# Patient Record
Sex: MALE | Race: WHITE | NOT HISPANIC OR LATINO | Employment: OTHER | URBAN - METROPOLITAN AREA
[De-identification: names, ages, dates, MRNs, and addresses within clinical notes are randomized per-mention and may not be internally consistent; named-entity substitution may affect disease eponyms.]

---

## 2017-01-09 ENCOUNTER — RADIANT APPOINTMENT (OUTPATIENT)
Dept: GENERAL RADIOLOGY | Facility: OTHER | Age: 32
End: 2017-01-09
Attending: NURSE PRACTITIONER
Payer: COMMERCIAL

## 2017-01-09 ENCOUNTER — OFFICE VISIT (OUTPATIENT)
Dept: FAMILY MEDICINE | Facility: OTHER | Age: 32
End: 2017-01-09
Payer: COMMERCIAL

## 2017-01-09 VITALS
HEIGHT: 73 IN | TEMPERATURE: 98.9 F | WEIGHT: 217.4 LBS | HEART RATE: 71 BPM | OXYGEN SATURATION: 98 % | SYSTOLIC BLOOD PRESSURE: 144 MMHG | BODY MASS INDEX: 28.81 KG/M2 | DIASTOLIC BLOOD PRESSURE: 70 MMHG | RESPIRATION RATE: 16 BRPM

## 2017-01-09 DIAGNOSIS — Z23 NEED FOR PROPHYLACTIC VACCINATION AND INOCULATION AGAINST INFLUENZA: ICD-10-CM

## 2017-01-09 DIAGNOSIS — Z23 NEED FOR DIPHTHERIA-TETANUS-PERTUSSIS (TDAP) VACCINE, ADULT/ADOLESCENT: ICD-10-CM

## 2017-01-09 DIAGNOSIS — S69.91XA INJURY OF RIGHT HAND, INITIAL ENCOUNTER: Primary | ICD-10-CM

## 2017-01-09 DIAGNOSIS — M79.641 PAIN OF RIGHT HAND: ICD-10-CM

## 2017-01-09 PROCEDURE — 73130 X-RAY EXAM OF HAND: CPT | Mod: RT

## 2017-01-09 PROCEDURE — 99213 OFFICE O/P EST LOW 20 MIN: CPT | Performed by: NURSE PRACTITIONER

## 2017-01-09 ASSESSMENT — PAIN SCALES - GENERAL: PAINLEVEL: SEVERE PAIN (7)

## 2017-01-09 NOTE — PROGRESS NOTES
"  SUBJECTIVE:                                                    Crescencio Moncada is a 31 year old male who presents to clinic today for the following health issues:      HPI    Joint Pain     Onset: last night punched a wooden wall     Description:   Location: right hand  Character: Sharp and now constant with numbness    Intensity: 7/10    Progression of Symptoms: same    Accompanying Signs & Symptoms:  Other symptoms: numbness, swelling and not being able to move   History:   Previous similar pain: no       Precipitating factors:   Trauma or overuse: YES    Alleviating factors:  Improved by: ice and NSAID - advil       Therapies Tried and outcome: ice and advil      Problem list and histories reviewed & adjusted, as indicated.  Additional history: as documented    Problem list, Medication list, Allergies, and Medical/Social/Surgical histories reviewed in EPIC and updated as appropriate.    ROS:  Constitutional, HEENT, cardiovascular, pulmonary, GI, , musculoskeletal, neuro, skin, endocrine and psych systems are negative, except as otherwise noted.    OBJECTIVE:                                                    /70 mmHg  Pulse 71  Temp(Src) 98.9  F (37.2  C) (Temporal)  Resp 16  Ht 6' 0.76\" (1.848 m)  Wt 217 lb 6.4 oz (98.612 kg)  BMI 28.88 kg/m2  SpO2 98%  Body mass index is 28.88 kg/(m^2).  GENERAL: healthy, alert and no distress  NECK: no adenopathy, no asymmetry, masses, or scars and thyroid normal to palpation  RESP: lungs clear to auscultation - no rales, rhonchi or wheezes  CV: regular rate and rhythm, normal S1 S2, no S3 or S4, no murmur, click or rub, no peripheral edema and peripheral pulses strong  ABDOMEN: soft, nontender, no hepatosplenomegaly, no masses and bowel sounds normal  MS: Hand/Finger Exam: Inspection:Carpals: moderate swelling, Metacarpals:  moderate swelling, Long Finger:  moderate swelling, RIng Finger:  moderate swelling, Small Finger:  moderate swelling  Tender: Carpals, " Metacarpals, Long Finger  RIng Finger, Small Finger  Non-tender thumb and pointer  Range of Motion decreased due to pain  Strength: weak grasp, unable to fully extend  SKIN: lesion between pointer and middle finger dry scabbed over.    Diagnostic Test Results:   Xray - Reviewed possible fracture metacarpal fifth finger right hand. Radiology read pending     ASSESSMENT/PLAN:                                                      1. Injury of right hand, initial encounter  Will splint, elevate and ice. Follow up with orthopedic specialty in 7-10 days  - ORTHO  REFERRAL    2. Pain of right hand  Ibuprofen and tylenol of rdiscomfort  - TDAP (ADACEL AGES 11-64)  - XR Hand Right G/E 3 Views; Future  - XR Finger Right G/E 2 Views; Future    3. Need for diphtheria-tetanus-pertussis (Tdap) vaccine, adult/adolescent  declined  - TDAP (ADACEL AGES 11-64)    4. Need for prophylactic vaccination and inoculation against influenza  declinced  - TDAP (ADACEL AGES 11-64)    See Patient Instructions    MAGALIS Field [facility]

## 2017-01-09 NOTE — NURSING NOTE
"Chief Complaint   Patient presents with     Musculoskeletal Problem       Initial /70 mmHg  Pulse 71  Temp(Src) 98.9  F (37.2  C) (Temporal)  Resp 16  Ht 6' 0.76\" (1.848 m)  Wt 217 lb 6.4 oz (98.612 kg)  BMI 28.88 kg/m2  SpO2 98% Estimated body mass index is 28.88 kg/(m^2) as calculated from the following:    Height as of this encounter: 6' 0.76\" (1.848 m).    Weight as of this encounter: 217 lb 6.4 oz (98.612 kg).  BP completed using cuff size: hannah Clinton CMA (AAMA)    "

## 2017-01-09 NOTE — PATIENT INSTRUCTIONS
Please continue to wear splint as much as possible to immobilize fracture. Continue to ice and elevate, ibuprofen 800 mg every 8 hours with 1000 mg of tylenol (do not take over 4000 mg of tylenol in 24 hours.).    Follow up with Dr. Dunn in 7- 10 days.     Thank you,  Maria D Wahl CNP

## 2017-01-09 NOTE — MR AVS SNAPSHOT
After Visit Summary   1/9/2017    Crescencio Moncada    MRN: [id number]           Patient Information     Date Of Birth          1985        Visit Information        Provider Department      1/9/2017 1:10 PM Maria D Wahl APRN CNP [facility]        Today's Diagnoses     Injury of right hand, initial encounter    -  1     Pain of right hand         Need for diphtheria-tetanus-pertussis (Tdap) vaccine, adult/adolescent         Need for prophylactic vaccination and inoculation against influenza           Care Instructions    Please continue to wear splint as much as possible to immobilize fracture. Continue to ice and elevate, ibuprofen 800 mg every 8 hours with 1000 mg of tylenol (do not take over 4000 mg of tylenol in 24 hours.).    Follow up with Dr. Dunn in 7- 10 days.     Thank you,  Maria D Walh CNP          Follow-ups after your visit        Additional Services     ORTHO  REFERRAL       [facility] is referring you to the Orthopedic  Services at [facility].       The  Representative will assist you in the coordination of your Orthopedic and Musculoskeletal Care as prescribed by your physician.    The  Representative will call you within 1 business day to help schedule your appointment, or you may contact the  Representative at:    All areas ~ [phone]     Type of Referral : 7-10 days with Dr. Dunn in [place].      Timeframe requested: Within 2 weeks    Coverage of these services is subject to the terms and limitations of your health insurance plan.  Please call member services at your health plan with any benefit or coverage questions.      If X-rays, CT or MRI's have been performed, please contact the facility where they were done to arrange for , prior to your scheduled appointment.  Please bring this referral request to your appointment and present it to your  "specialist.                  Future tests that were ordered for you today     Open Future Orders        Priority Expected Expires Ordered    XR Finger Right G/E 2 Views Routine 2017            Who to contact     If you have questions or need follow up information about today's clinic visit or your schedule please contact [facility] directly at [phone].  Normal or non-critical lab and imaging results will be communicated to you by MyChart, letter or phone within 4 business days after the clinic has received the results. If you do not hear from us within 7 days, please contact the clinic through Swarm64hart or phone. If you have a critical or abnormal lab result, we will notify you by phone as soon as possible.  Submit refill requests through [employer] or call your pharmacy and they will forward the refill request to us. Please allow 3 business days for your refill to be completed.          Additional Information About Your Visit        Swarm64harInspherion Information     [employer] lets you send messages to your doctor, view your test results, renew your prescriptions, schedule appointments and more. To sign up, go to [email or web address] . Click on \"Log in\" on the left side of the screen, which will take you to the Welcome page. Then click on \"Sign up Now\" on the right side of the page.     You will be asked to enter the access code listed below, as well as some personal information. Please follow the directions to create your username and password.     Your access code is: [email or web address]  Expires: 2017  2:22 PM     Your access code will  in 90 days. If you need help or a new code, please call your [place] clinic or [phone].        Care EveryWhere ID     This is your Care EveryWhere ID. This could be used by other organizations to access your [place] medical records  [id number]        Your Vitals Were     Pulse Temperature Respirations Height BMI (Body Mass Index) Pulse " "Oximetry    71 98.9  F (37.2  C) (Temporal) 16 6' 0.76\" (1.848 m) 28.88 kg/m2 98%       Blood Pressure from Last 3 Encounters:   01/09/17 144/70   12/16/16 136/70    Weight from Last 3 Encounters:   01/09/17 217 lb 6.4 oz (98.612 kg)   12/16/16 216 lb 3.2 oz (98.068 kg)              We Performed the Following     ORTHO  REFERRAL     TDAP (ADACEL AGES 11-64)        Primary Care Provider Office Phone # Fax #    MAGALIS Cook [facility] [phone]       [facility] [address]  [facility] [address]        Thank you!     Thank you for choosing [facility]  for your care. Our goal is always to provide you with excellent care. Hearing back from our patients is one way we can continue to improve our services. Please take a few minutes to complete the written survey that you may receive in the mail after your visit with us. Thank you!             Your Updated Medication List - Protect others around you: Learn how to safely use, store and throw away your medicines at www.disposemymeds.org.          This list is accurate as of: 1/9/17  2:22 PM.  Always use your most recent med list.                   Brand Name Dispense Instructions for use    ketoconazole 2 % cream    NIZORAL    30 g    Apply topically 2 times daily       triamcinolone 0.1 % cream    KENALOG    30 g    Apply sparingly to affected area three times daily for 14 days.         "

## 2017-01-10 NOTE — PROGRESS NOTES
Quick Note:    Results discussed with patient in clinic. States understanding of these results.    Maria D Wahl CNP  ______

## 2017-01-17 ENCOUNTER — OFFICE VISIT (OUTPATIENT)
Dept: ORTHOPEDICS | Facility: CLINIC | Age: 32
End: 2017-01-17
Payer: COMMERCIAL

## 2017-01-17 ENCOUNTER — RADIANT APPOINTMENT (OUTPATIENT)
Dept: GENERAL RADIOLOGY | Facility: CLINIC | Age: 32
End: 2017-01-17
Attending: PHYSICAL MEDICINE & REHABILITATION
Payer: COMMERCIAL

## 2017-01-17 VITALS — WEIGHT: 217.4 LBS | BODY MASS INDEX: 28.81 KG/M2 | HEIGHT: 73 IN | HEART RATE: 60 BPM

## 2017-01-17 DIAGNOSIS — S62.306A CLOSED NONDISPLACED FRACTURE OF FIFTH METACARPAL BONE OF RIGHT HAND, UNSPECIFIED PORTION OF METACARPAL, INITIAL ENCOUNTER: Primary | ICD-10-CM

## 2017-01-17 DIAGNOSIS — S69.91XA: ICD-10-CM

## 2017-01-17 PROCEDURE — 26600 TREAT METACARPAL FRACTURE: CPT | Performed by: PHYSICAL MEDICINE & REHABILITATION

## 2017-01-17 PROCEDURE — 73130 X-RAY EXAM OF HAND: CPT | Mod: TC

## 2017-01-17 NOTE — PROGRESS NOTES
Sports Medicine Clinic Visit    PCP: Apryl Correia    CC: Patient presents with:  Fracture: right hand, little finger      HPI:  Crescencio Moncada is a 31 year old male who is seen in consultation at the request of Maria D Wahl NP.   He notes an injury on 1/8/2017 when he punched a wooden wall.  Pain is located on the right hand, little finger.  He rates the pain at a 8/10 at its worst and a 4/10 currently.  Symptoms are relieved with nothing and worsened by gripping. He endorses swelling, numbness and tingling through the 3rd-5th fingers, and weakness and denies popping, grinding, catching, locking, instability, pain in other joints and fever, chills.  Other treatment has included cold compresses, Tylenol, ibuprofen and bracing (pre fabricated wrist brace). He notes difficulty with use of the little finger. He has had increased swelling with the pre fabricated brace given to him by Maria D. He has not been back to work since the injury.       Review of Systems:  Musculoskeletal: as above  Remainder of review of systems is negative including constitutional, CV, pulmonary, GI, Skin and Neurologic except as noted in HPI or medical history.      Patient's current problem list, past medical and surgical history, and family history were reviewed.    No previous hand fracture.  No past surgical history on file.  Family History   Problem Relation Age of Onset     Myocardial Infarction Paternal Grandfather      Genetic Disorder Mother      spinal muscular atrophy gene     Genetic Disorder Sister 0     spinal muscular atrophy     Coronary Artery Disease Paternal Half-Brother      Social History     Social History     Marital Status:      Spouse Name: N/A     Number of Children: N/A     Years of Education: N/A     Occupational History     Not on file.     Social History Main Topics     Smoking status: Current Every Day Smoker     Types: Dip, chew, snus or snuff     Smokeless tobacco: Not on file     Alcohol  "Use: Yes      Comment: 4-5 beers/day, sometimes more on the weekend     Drug Use: No     Sexual Activity:     Partners: Female     Other Topics Concern     Not on file     Social History Narrative       He works doing ceramic tile installation  At baseline, he does not need assistance with ambulation      Current Outpatient Prescriptions   Medication     ketoconazole (NIZORAL) 2 % cream     triamcinolone (KENALOG) 0.1 % cream     No current facility-administered medications for this visit.     No Known Allergies      Objective:  Pulse 60  Ht 6' 0.76\" (1.848 m)  Wt 217 lb 6.4 oz (98.612 kg)  BMI 28.88 kg/m2    General: healthy, alert and in no distress    Head: Normocephalic, atraumatic  Eyes: no scleral icterus or conjunctival erythema   Oropharynx:  Mucous membranes moist  CV: regular rhythm by palpation, 2+ distal pulses, no pedal edema    Resp: normal respiratory effort without conversational dyspnea   Psych: normal mood and affect    Gait: Non-antalgic, appropriate coordination and balance   Neuro: normal light touch sensory exam of the extremities. Motor strength as noted below    Musculoskeletal:    Bilateral Wrist and Hand exam    Inspection:  Mild swelling of the right dorsal ulnar hand.  Abrasions on MCP joints of both hands.      Palpation:  Tender to palpation of the right 5th MCP joint.      ROM:  Wrist flexion/extension is full bilaterally.  He does have some pain with movement of the right 5th finger.      Strength:  5/5 bilateral shoulder abduction, elbow flexion/extension, forearm supination/pronation, wrist flexion/extension.  <3/5 right  with 5th finger.  4/5 right finger abduction.  5/5 left  strength and finger abduction.    Neurovascular:       2+ radial pulses bilaterally and normal sensation to light touch in the radial, median and ulnar nerve distributions.  Post cast application neurovascular check intact.      Radiology:  Independent visualization of images performed.  Repeat " x-rays today show continued distal 5th metaphyseal fracture.  No change in the mild angulation.    Recent Results (from the past 744 hour(s))   XR Hand Right G/E 3 Views    Narrative    RIGHT HAND THREE OR MORE VIEWS  1/9/2017 2:03 PM     HISTORY: Pain in right hand.    COMPARISON: None.        Impression    IMPRESSION:  There is a distal fifth metacarpal metaphyseal fracture  with mild volar and radial angulation of the distal fragment. No other  fractures. Joint spaces are well-maintained.    TERE MENDIETA MD       Assessment:  1. Injury of right hand including fingers    2. Fracture of fifth metacarpal bone of right hand        Plan:  Discussed the assessment with the patient. We discussed the following treatment options: symptom treatment, activity modification/rest, imaging and rehab. Following discussion, plan:  -Short arm ulnar gutter cast applied today.  Cast care outlined in patient instructions.    -He is cleared to return to work as a , but I told him that if there is something he realizes he cannot do when he returns to work that we would be happy to write him a note detailing restrictions.    -Follow up in 2 weeks for repeat x-rays and re-evaluation.    Jessie Dunn MD, CAQ  [facility]

## 2017-01-17 NOTE — PATIENT INSTRUCTIONS
Today's Plan of Care:  - Cast care as directed below     Follow Up:  2 weeks for repeat x-rays and reevaluation. Call if you have any questions or concerns.        Caring for Your Cast     A cast is used to protect an injured body part and allow it to heal by limiting the amount of motion occurring around the injury. Pain and swelling of the injured area is normal for 48 hours after your cast is put on. If you have swelling, wiggle your toes or fingers to ease it. Doing so encourages blood flow to your arm or leg.     It is important that you keep your cast dry, unless your doctor tells you differently. If the padding of the cast gets wet, your skin may be damaged and become infected. When showering or taking a bath, put the cast in a heavy plastic bag that can be held in place with a rubber band. If your cast gets wet and does not dry out in four to five hours, call your doctor s office.   To keep the cast clean, use wash clothes or baby wipes around it.   You may experience some itching inside the cast. This is normal. Avoid putting anything in the cast, even your finger, as you can injure your skin and cause infection. Try shaking some talcum powder or blowing cool air from a hair dryer into the cast to ease itching.   If these signs or symptoms develop, call your doctor immediately.       Pain gets worse     Swelling that cuts off blood flow that does not go away, even when you lift the body part above the level of your heart     Fever after itching. It may be related to an infection.     Fluid draining from your skin under the cast     Your cast may become loose as swelling goes down. If the cast feels too loose or if it is so loose you can take it off, call your doctor s office.     Your doctor or  will give you recommendations for activity based on your injury. Some sports allow casts if properly padded by a doctor or .     For complete healing, your cast should only be removed  at the direction of your doctor or clinic staff. A special saw ensures its safe removal and protects the skin and other tissue under the cast.

## 2017-01-17 NOTE — MR AVS SNAPSHOT
After Visit Summary   1/17/2017    Crescencio Moncada    MRN: [id number]           Patient Information     Date Of Birth          1985        Visit Information        Provider Department      1/17/2017 11:00 AM Apryl Dunn MD [facility]        Today's Diagnoses     Injury of right hand including fingers    -  1       Care Instructions    Today's Plan of Care:  - Cast care as directed below     Follow Up:  2 weeks for repeat x-rays and reevaluation. Call if you have any questions or concerns.        Caring for Your Cast     A cast is used to protect an injured body part and allow it to heal by limiting the amount of motion occurring around the injury. Pain and swelling of the injured area is normal for 48 hours after your cast is put on. If you have swelling, wiggle your toes or fingers to ease it. Doing so encourages blood flow to your arm or leg.     It is important that you keep your cast dry, unless your doctor tells you differently. If the padding of the cast gets wet, your skin may be damaged and become infected. When showering or taking a bath, put the cast in a heavy plastic bag that can be held in place with a rubber band. If your cast gets wet and does not dry out in four to five hours, call your doctor s office.   To keep the cast clean, use wash clothes or baby wipes around it.   You may experience some itching inside the cast. This is normal. Avoid putting anything in the cast, even your finger, as you can injure your skin and cause infection. Try shaking some talcum powder or blowing cool air from a hair dryer into the cast to ease itching.   If these signs or symptoms develop, call your doctor immediately.       Pain gets worse     Swelling that cuts off blood flow that does not go away, even when you lift the body part above the level of your heart     Fever after itching. It may be related to an infection.     Fluid draining from your skin under the cast  "    Your cast may become loose as swelling goes down. If the cast feels too loose or if it is so loose you can take it off, call your doctor s office.     Your doctor or  will give you recommendations for activity based on your injury. Some sports allow casts if properly padded by a doctor or .     For complete healing, your cast should only be removed at the direction of your doctor or clinic staff. A special saw ensures its safe removal and protects the skin and other tissue under the cast.           Follow-ups after your visit        Who to contact     If you have questions or need follow up information about today's clinic visit or your schedule please contact [facility] directly at [phone].  Normal or non-critical lab and imaging results will be communicated to you by Scholasticahart, letter or phone within 4 business days after the clinic has received the results. If you do not hear from us within 7 days, please contact the clinic through Scholastic[employer]art or phone. If you have a critical or abnormal lab result, we will notify you by phone as soon as possible.  Submit refill requests through [employer] or call your pharmacy and they will forward the refill request to us. Please allow 3 business days for your refill to be completed.          Additional Information About Your Visit        ScholasticaharKonTEM Information     [employer] lets you send messages to your doctor, view your test results, renew your prescriptions, schedule appointments and more. To sign up, go to [email or web address] . Click on \"Log in\" on the left side of the screen, which will take you to the Welcome page. Then click on \"Sign up Now\" on the right side of the page.     You will be asked to enter the access code listed below, as well as some personal information. Please follow the directions to create your username and password.     Your access code is: [email or web address]  Expires: 4/9/2017  2:22 PM     Your access code " "will  in 90 days. If you need help or a new code, please call your [place] clinic or [phone].        Care EveryWhere ID     This is your Care EveryWhere ID. This could be used by other organizations to access your [place] medical records  [id number]        Your Vitals Were     Pulse Height BMI (Body Mass Index)             60 6' 0.76\" (1.848 m) 28.88 kg/m2          Blood Pressure from Last 3 Encounters:   17 144/70   16 136/70    Weight from Last 3 Encounters:   17 217 lb 6.4 oz (98.612 kg)   17 217 lb 6.4 oz (98.612 kg)   16 216 lb 3.2 oz (98.068 kg)               Primary Care Provider Office Phone # Fax #    MAGALIS Cook [facility] [phone]       [facility] [address]  [place] [address]        Thank you!     Thank you for choosing [facility]  for your care. Our goal is always to provide you with excellent care. Hearing back from our patients is one way we can continue to improve our services. Please take a few minutes to complete the written survey that you may receive in the mail after your visit with us. Thank you!             Your Updated Medication List - Protect others around you: Learn how to safely use, store and throw away your medicines at www.disposemymeds.org.          This list is accurate as of: 17 11:42 AM.  Always use your most recent med list.                   Brand Name Dispense Instructions for use    ketoconazole 2 % cream    NIZORAL    30 g    Apply topically 2 times daily       triamcinolone 0.1 % cream    KENALOG    30 g    Apply sparingly to affected area three times daily for 14 days.         "

## 2017-01-17 NOTE — NURSING NOTE
"Chief Complaint   Patient presents with     Fracture     right hand, little finger       Initial Pulse 60  Ht 6' 0.76\" (1.848 m)  Wt 217 lb 6.4 oz (98.612 kg)  BMI 28.88 kg/m2 Estimated body mass index is 28.88 kg/(m^2) as calculated from the following:    Height as of this encounter: 6' 0.76\" (1.848 m).    Weight as of this encounter: 217 lb 6.4 oz (98.612 kg).  BP completed using cuff size: NA (Not Taken)    Carlota Arango M.Ed., ATC      "

## 2017-01-31 ENCOUNTER — OFFICE VISIT (OUTPATIENT)
Dept: ORTHOPEDICS | Facility: CLINIC | Age: 32
End: 2017-01-31
Payer: COMMERCIAL

## 2017-01-31 ENCOUNTER — RADIANT APPOINTMENT (OUTPATIENT)
Dept: GENERAL RADIOLOGY | Facility: CLINIC | Age: 32
End: 2017-01-31
Attending: PHYSICAL MEDICINE & REHABILITATION
Payer: COMMERCIAL

## 2017-01-31 VITALS — BODY MASS INDEX: 28.76 KG/M2 | HEART RATE: 69 BPM | WEIGHT: 217 LBS | HEIGHT: 73 IN

## 2017-01-31 DIAGNOSIS — S62.366D CLOSED NONDISPLACED FRACTURE OF NECK OF FIFTH METACARPAL BONE OF RIGHT HAND WITH ROUTINE HEALING, SUBSEQUENT ENCOUNTER: Primary | ICD-10-CM

## 2017-01-31 DIAGNOSIS — S62.306A: ICD-10-CM

## 2017-01-31 PROCEDURE — 73130 X-RAY EXAM OF HAND: CPT | Mod: TC

## 2017-01-31 PROCEDURE — 29075 APPL CST ELBW FNGR SHORT ARM: CPT | Mod: 58 | Performed by: PHYSICAL MEDICINE & REHABILITATION

## 2017-01-31 PROCEDURE — 99207 ZZC FRACTURE CARE IN GLOBAL PERIOD: CPT | Performed by: PHYSICAL MEDICINE & REHABILITATION

## 2017-01-31 NOTE — MR AVS SNAPSHOT
After Visit Summary   1/31/2017    Crescencio Moncada    MRN: [id number]           Patient Information     Date Of Birth          1985        Visit Information        Provider Department      1/31/2017 10:00 AM Apryl Dunn MD [facility]        Today's Diagnoses     Closed nondisplaced fracture of fifth metacarpal bone of right hand    -  1       Care Instructions    Today's Plan of Care:  - Cast care as directed below    Follow Up:  3 weeks or sooner if symptoms fail to improve or worsen. Call if you have any questions or concerns.        Caring for Your Cast     A cast is used to protect an injured body part and allow it to heal by limiting the amount of motion occurring around the injury. Pain and swelling of the injured area is normal for 48 hours after your cast is put on. If you have swelling, wiggle your toes or fingers to ease it. Doing so encourages blood flow to your arm or leg.     It is important that you keep your cast dry, unless your doctor tells you differently. If the padding of the cast gets wet, your skin may be damaged and become infected. When showering or taking a bath, put the cast in a heavy plastic bag that can be held in place with a rubber band. If your cast gets wet and does not dry out in four to five hours, call your doctor s office.   To keep the cast clean, use wash clothes or baby wipes around it.   You may experience some itching inside the cast. This is normal. Avoid putting anything in the cast, even your finger, as you can injure your skin and cause infection. Try shaking some talcum powder or blowing cool air from a hair dryer into the cast to ease itching.   If these signs or symptoms develop, call your doctor immediately.       Pain gets worse     Swelling that cuts off blood flow that does not go away, even when you lift the body part above the level of your heart     Fever after itching. It may be related to an infection.     Fluid  "draining from your skin under the cast     Your cast may become loose as swelling goes down. If the cast feels too loose or if it is so loose you can take it off, call your doctor s office.     Your doctor or  will give you recommendations for activity based on your injury. Some sports allow casts if properly padded by a doctor or .     For complete healing, your cast should only be removed at the direction of your doctor or clinic staff. A special saw ensures its safe removal and protects the skin and other tissue under the cast.           Follow-ups after your visit        Who to contact     If you have questions or need follow up information about today's clinic visit or your schedule please contact [facility] directly at [phone].  Normal or non-critical lab and imaging results will be communicated to you by Healthrageoushart, letter or phone within 4 business days after the clinic has received the results. If you do not hear from us within 7 days, please contact the clinic through Healthrageoushart or phone. If you have a critical or abnormal lab result, we will notify you by phone as soon as possible.  Submit refill requests through Websense or call your pharmacy and they will forward the refill request to us. Please allow 3 business days for your refill to be completed.          Additional Information About Your Visit        Websense Information     Websense lets you send messages to your doctor, view your test results, renew your prescriptions, schedule appointments and more. To sign up, go to [email or web address] . Click on \"Log in\" on the left side of the screen, which will take you to the Welcome page. Then click on \"Sign up Now\" on the right side of the page.     You will be asked to enter the access code listed below, as well as some personal information. Please follow the directions to create your username and password.     Your access code is: [email or web address]  Expires: " "2017  2:22 PM     Your access code will  in 90 days. If you need help or a new code, please call your [place] clinic or [phone].        Care EveryWhere ID     This is your Care EveryWhere ID. This could be used by other organizations to access your [place] medical records  [id number]        Your Vitals Were     Pulse Height BMI (Body Mass Index)             69 6' 0.76\" (1.848 m) 28.82 kg/m2          Blood Pressure from Last 3 Encounters:   17 144/70   16 136/70    Weight from Last 3 Encounters:   17 217 lb (98.431 kg)   17 217 lb 6.4 oz (98.612 kg)   17 217 lb 6.4 oz (98.612 kg)               Primary Care Provider Office Phone # Fax #    Apryl MAGALIS [facility] [phone]       [address]  [facility] [address]        Thank you!     Thank you for choosing [facility]  for your care. Our goal is always to provide you with excellent care. Hearing back from our patients is one way we can continue to improve our services. Please take a few minutes to complete the written survey that you may receive in the mail after your visit with us. Thank you!             Your Updated Medication List - Protect others around you: Learn how to safely use, store and throw away your medicines at www.disposemymeds.org.          This list is accurate as of: 17 10:45 AM.  Always use your most recent med list.                   Brand Name Dispense Instructions for use    ketoconazole 2 % cream    NIZORAL    30 g    Apply topically 2 times daily       triamcinolone 0.1 % cream    KENALOG    30 g    Apply sparingly to affected area three times daily for 14 days.         "

## 2017-01-31 NOTE — PATIENT INSTRUCTIONS
Today's Plan of Care:  - Cast care as directed below    Follow Up:  3 weeks or sooner if symptoms fail to improve or worsen. Call if you have any questions or concerns.        Caring for Your Cast     A cast is used to protect an injured body part and allow it to heal by limiting the amount of motion occurring around the injury. Pain and swelling of the injured area is normal for 48 hours after your cast is put on. If you have swelling, wiggle your toes or fingers to ease it. Doing so encourages blood flow to your arm or leg.     It is important that you keep your cast dry, unless your doctor tells you differently. If the padding of the cast gets wet, your skin may be damaged and become infected. When showering or taking a bath, put the cast in a heavy plastic bag that can be held in place with a rubber band. If your cast gets wet and does not dry out in four to five hours, call your doctor s office.   To keep the cast clean, use wash clothes or baby wipes around it.   You may experience some itching inside the cast. This is normal. Avoid putting anything in the cast, even your finger, as you can injure your skin and cause infection. Try shaking some talcum powder or blowing cool air from a hair dryer into the cast to ease itching.   If these signs or symptoms develop, call your doctor immediately.       Pain gets worse     Swelling that cuts off blood flow that does not go away, even when you lift the body part above the level of your heart     Fever after itching. It may be related to an infection.     Fluid draining from your skin under the cast     Your cast may become loose as swelling goes down. If the cast feels too loose or if it is so loose you can take it off, call your doctor s office.     Your doctor or  will give you recommendations for activity based on your injury. Some sports allow casts if properly padded by a doctor or .     For complete healing, your cast should  only be removed at the direction of your doctor or clinic staff. A special saw ensures its safe removal and protects the skin and other tissue under the cast.

## 2017-01-31 NOTE — PROGRESS NOTES
Sports Medicine Clinic Visit - Interim History January 31, 2017    Initial Visit Date 1/17/2017  Initial Injury Date 1/8/2017    PCP: Apryl Correia    Crescencio Moncada is a 31 year old male who is seen in f/u up for right fifth metacarpal fracture. Since last visit on 1/17/2017 patient has had improvement in pain.  He rates the pain at a   0/10 currently.  Symptoms are relieved with immobilization and worsened by nothing. He has some trouble working with the cast on as he has a hard time keeping it dry.       - Now ~ 3 weeks from initial injury      Review of Systems  Musculoskeletal: as above  Remainder of review of systems is negative including constitutional, eyes, ENT, CV, pulmonary, GI, , endocrine, skin, hematologic, and neurologic except as noted in HPI or medical history.    History reviewed. No pertinent past surgical/medical/family/social history.    No previous hand fractures.    Family History   Problem Relation Age of Onset     Myocardial Infarction Paternal Grandfather      Genetic Disorder Mother      spinal muscular atrophy gene     Genetic Disorder Sister 0     spinal muscular atrophy     Coronary Artery Disease Paternal Half-Brother      Social History     Social History     Marital Status:      Spouse Name: N/A     Number of Children: N/A     Years of Education: N/A     Occupational History     Not on file.     Social History Main Topics     Smoking status: Current Every Day Smoker     Types: Dip, chew, snus or snuff     Smokeless tobacco: Not on file     Alcohol Use: Yes      Comment: 4-5 beers/day, sometimes more on the weekend     Drug Use: No     Sexual Activity:     Partners: Female     Other Topics Concern     Not on file     Social History Narrative       He works doing ceramic tile installation  At baseline, he does not need assistance with ambulation    Current Outpatient Prescriptions   Medication     ketoconazole (NIZORAL) 2 % cream     triamcinolone (KENALOG) 0.1 %  "cream     No current facility-administered medications for this visit.     No Known Allergies      Objective:  Pulse 69  Ht 6' 0.76\" (1.848 m)  Wt 217 lb (98.431 kg)  BMI 28.82 kg/m2    General: healthy, alert and in no distress    Head: Normocephalic, atraumatic  Eyes: no scleral icterus or conjunctival erythema   Oropharynx:  Mucous membranes moist  Skin: no erythema, ecchymosis, petechiae, or jaundice  CV: regular rhythm by palpation, 2+ distal pulses, no pedal edema    Resp: normal respiratory effort without conversational dyspnea   Psych: normal mood and affect    Gait: Non-antalgic, appropriate coordination and balance   Neuro: normal light touch sensory exam of the extremities. Motor strength as noted below    Musculoskeletal:    Bilateral Wrist and Hand exam    Inspection:  Abrasions on MCP joints of the right hand.      Palpation:  No tenderness to palpation of either hand or wrist.  .      ROM:  Wrist flexion/extension is mildly decreased on the right as compared to the left.  ROM is not painful.      Strength:  5/5 bilateral elbow flexion/extension, forearm supination/pronation, wrist flexion/extension.  4/5 right  strength and right finger abduction.  5/5 left  strength and left finger abduction.      Neurovascular:       2+ radial pulses bilaterally and normal sensation to light touch in the radial, median and ulnar nerve distributions.  Post cast application neurovascular check intact.      Radiology:  X-rays ordered and independent visualization of images performed and discussed with patient.  Shows no change in alignment.  Fracture line more difficult to see suggesting interval callus formation.  Full radiology report to follow.      Assessment:  1. Closed nondisplaced fracture of fifth metacarpal bone of right hand        Plan:  Discussed the assessment with the patient. We discussed the following treatment options: symptom treatment, activity modification/rest, imaging and rehab. " Following discussion, plan:  -Cast removed and x-rays taken.  Fracture line seems less apparent.  -Cast re-applied.  Will follow up in 3 weeks with cast removal, repeat x-rays, and re-evaluation.     Jessie Dunn MD, CAQ  [facility]

## 2017-02-17 ENCOUNTER — OFFICE VISIT (OUTPATIENT)
Dept: ORTHOPEDICS | Facility: OTHER | Age: 32
End: 2017-02-17
Payer: COMMERCIAL

## 2017-02-17 ENCOUNTER — RADIANT APPOINTMENT (OUTPATIENT)
Dept: GENERAL RADIOLOGY | Facility: OTHER | Age: 32
End: 2017-02-17
Attending: PHYSICAL MEDICINE & REHABILITATION
Payer: COMMERCIAL

## 2017-02-17 VITALS — WEIGHT: 221.2 LBS | HEIGHT: 73 IN | HEART RATE: 69 BPM | BODY MASS INDEX: 29.31 KG/M2

## 2017-02-17 DIAGNOSIS — S62.366D CLOSED NONDISPLACED FRACTURE OF NECK OF FIFTH METACARPAL BONE OF RIGHT HAND WITH ROUTINE HEALING, SUBSEQUENT ENCOUNTER: Primary | ICD-10-CM

## 2017-02-17 DIAGNOSIS — S62.366D CLOSED NONDISPLACED FRACTURE OF NECK OF FIFTH METACARPAL BONE OF RIGHT HAND WITH ROUTINE HEALING, SUBSEQUENT ENCOUNTER: ICD-10-CM

## 2017-02-17 PROCEDURE — 73130 X-RAY EXAM OF HAND: CPT | Mod: RT

## 2017-02-17 PROCEDURE — 99207 ZZC FRACTURE CARE IN GLOBAL PERIOD: CPT | Performed by: PHYSICAL MEDICINE & REHABILITATION

## 2017-02-17 NOTE — PATIENT INSTRUCTIONS
Today's Plan of Care:  -Rehab: Occupational Therapy: [facility] - [phone]. Please do 5-6 days of exercises per week between formal sessions and the home exercises they provide.    Follow Up:  1 month or sooner if symptoms fail to improve or worsen. Call if you have any questions or concerns.

## 2017-02-17 NOTE — NURSING NOTE
"Chief Complaint   Patient presents with     Fracture     right fifth metacarpal fracture       Initial Ht 6' 0.76\" (1.848 m)  Wt 221 lb 3.2 oz (100.3 kg)  BMI 29.38 kg/m2 Estimated body mass index is 29.38 kg/(m^2) as calculated from the following:    Height as of this encounter: 6' 0.76\" (1.848 m).    Weight as of this encounter: 221 lb 3.2 oz (100.3 kg).  Medication Reconciliation: complete     Carlota Arango M.Ed., ATC      "

## 2017-02-17 NOTE — MR AVS SNAPSHOT
After Visit Summary   2/17/2017    Cerscencio Moncada    MRN: [id number]           Patient Information     Date Of Birth          1985        Visit Information        Provider Department      2/17/2017 8:20 AM Apryl Dunn MD [facility]        Today's Diagnoses     Closed nondisplaced fracture of neck of fifth metacarpal bone of right hand with routine healing, subsequent encounter    -  1      Care Instructions    Today's Plan of Care:  -Rehab: Occupational Therapy: [facility] - [phone]. Please do 5-6 days of exercises per week between formal sessions and the home exercises they provide.    Follow Up:  1 month or sooner if symptoms fail to improve or worsen. Call if you have any questions or concerns.             Follow-ups after your visit        Additional Services     [facility] PT, HAND, AND CHIROPRACTIC REFERRAL       **This order will print in the [facility] Scheduling Office**    Physical Therapy, Hand Therapy and Chiropractic Care are available through:    *[facility]  *[facility]  *[facility]    Call one number to schedule at any of the above locations: [phone].    Your provider has referred you to: Hand Therapy    Indication/Reason for Referral: Hand Pain  Onset of Injury: 1/8/2017  Therapy Orders: Evaluate and Treat  Special Programs: None  Special Request: None    Additional Comments for the Therapist or Chiropractor: None    Please be aware that coverage of these services is subject to the terms and limitations of your health insurance plan.  Call member services at your health plan with any benefit or coverage questions.      Please bring the following to your appointment:    *Your personal calendar for scheduling future appointments  *Comfortable clothing                  Who to contact     If you have questions or need follow up information about today's clinic visit or your  "schedule please contact [facility] directly at [phone].  Normal or non-critical lab and imaging results will be communicated to you by MyChart, letter or phone within 4 business days after the clinic has received the results. If you do not hear from us within 7 days, please contact the clinic through Petbrosiahart or phone. If you have a critical or abnormal lab result, we will notify you by phone as soon as possible.  Submit refill requests through [employer] or call your pharmacy and they will forward the refill request to us. Please allow 3 business days for your refill to be completed.          Additional Information About Your Visit        PetbrosiaharBackchannelmedia Information     [employer]quisition lets you send messages to your doctor, view your test results, renew your prescriptions, schedule appointments and more. To sign up, go to [email or web address] . Click on \"Log in\" on the left side of the screen, which will take you to the Welcome page. Then click on \"Sign up Now\" on the right side of the page.     You will be asked to enter the access code listed below, as well as some personal information. Please follow the directions to create your username and password.     Your access code is: [email or web address]  Expires: 2017  2:22 PM     Your access code will  in 90 days. If you need help or a new code, please call your [place] clinic or [phone].        Care EveryWhere ID     This is your Care EveryWhere ID. This could be used by other organizations to access your [place] medical records  [id number]        Your Vitals Were     Pulse Height BMI (Body Mass Index)             69 6' 0.76\" (1.848 m) 29.38 kg/m2          Blood Pressure from Last 3 Encounters:   17 144/70   16 136/70    Weight from Last 3 Encounters:   17 221 lb 3.2 oz (100.3 kg)   17 217 lb (98.4 kg)   17 217 lb 6.4 oz (98.6 kg)              We Performed the Following     [facility] PT, HAND, AND CHIROPRACTIC REFERRAL        " Primary Care Provider Office Phone # Fax #    Apryltheron Olmedone Correia, MAGALIS [facility] [phone]       [address]  [facility] [address]        Thank you!     Thank you for choosing [facility]  for your care. Our goal is always to provide you with excellent care. Hearing back from our patients is one way we can continue to improve our services. Please take a few minutes to complete the written survey that you may receive in the mail after your visit with us. Thank you!             Your Updated Medication List - Protect others around you: Learn how to safely use, store and throw away your medicines at www.disposemymeds.org.          This list is accurate as of: 2/17/17  8:59 AM.  Always use your most recent med list.                   Brand Name Dispense Instructions for use    ketoconazole 2 % cream    NIZORAL    30 g    Apply topically 2 times daily       triamcinolone 0.1 % cream    KENALOG    30 g    Apply sparingly to affected area three times daily for 14 days.

## 2017-02-17 NOTE — PROGRESS NOTES
Sports Medicine Clinic Visit - Interim History February 17, 2017    Initial Visit Date 1/17/17  Initial Injury Date 1/8/2017    PCP: Apryl Correia    Crescencio Moncada is a 31 year old male who is seen in f/u up for a right 5th finger fracture. Since last visit on Visit date not found patient has been doing well. He reports no issues with the cast. No pain.  He rates the pain at a  0/10 currently.  Symptoms are relieved with immobilization and worsened by nothing. He does report a lot of stiffness after the cast was removed.    - Now ~ 5 weeks from initial injury      Review of Systems  Musculoskeletal: as above  Remainder of review of systems is negative including constitutional, eyes, ENT, CV, pulmonary, GI, , endocrine, skin, hematologic, and neurologic except as noted in HPI or medical history.    History reviewed. No pertinent past surgical/medical/family/social history.    Family History   Problem Relation Age of Onset     Myocardial Infarction Paternal Grandfather      Genetic Disorder Mother      spinal muscular atrophy gene     Genetic Disorder Sister 0     spinal muscular atrophy     Coronary Artery Disease Paternal Half-Brother      Social History     Social History     Marital status:      Spouse name: N/A     Number of children: N/A     Years of education: N/A     Occupational History     Not on file.     Social History Main Topics     Smoking status: Current Every Day Smoker     Types: Dip, chew, snus or snuff     Smokeless tobacco: Not on file     Alcohol use Yes      Comment: 4-5 beers/day, sometimes more on the weekend     Drug use: No     Sexual activity: Yes     Partners: Female     Other Topics Concern     Not on file     Social History Narrative       He works doing ceramic tile installation  At baseline, he does not need assistance with ambulation    Current Outpatient Prescriptions   Medication     ketoconazole (NIZORAL) 2 % cream     triamcinolone (KENALOG) 0.1 % cream  "    No current facility-administered medications for this visit.      No Known Allergies      Objective:  Pulse 69  Ht 6' 0.76\" (1.848 m)  Wt 221 lb 3.2 oz (100.3 kg)  BMI 29.38 kg/m2    General: healthy, alert and in no distress    Head: Normocephalic, atraumatic  Eyes: no scleral icterus or conjunctival erythema   Oropharynx:  Mucous membranes moist  Skin: no erythema, ecchymosis, petechiae, or jaundice  CV: regular rhythm by palpation, 2+ distal pulses  Resp: normal respiratory effort without conversational dyspnea   Psych: normal mood and affect    Gait: Non-antalgic, appropriate coordination and balance   Neuro: normal light touch sensory exam of the extremities. Motor strength as noted below    Musculoskeletal:    Bilateral Wrist and Hand exam    Inspection:  Dry skin from the cast    Palpation:  No tenderness to palpation of the wrist, hand, or fingers    ROM:  Slightly decreased with active right wrist flexion/extension (passive is full).  Active forearm supination/pronation and radial/ulnar deviation is full bilaterally.      Strength:  5/5 shoulder abduction, elbow flexion/extension, forearm supination/pronation, wrist extension, finger abduction bilaterally.  4/5 right wrist flexion and  strength (somewhat painful).  5/5 left wrist flexion and  strength.  Right 5th finger flexion/extension at the MCP, PIP, and DIP joints 4/5 with mild pain.      Neurovascular:  2+ radial pulses bilaterally with brisk capillary refill and normal sensation to light touch in the radial, median and ulnar nerve distributions    Radiology:  X-rays ordered and independent visualization of images performed.  Shows no change in alignment.  There appears to be some interval callous formation.  Full radiology report to follow.      Assessment:  1. Closed nondisplaced fracture of neck of fifth metacarpal bone of right hand with routine healing, subsequent encounter        Plan:  Discussed the assessment with the patient. We " discussed the following treatment options: symptom treatment, activity modification/rest, imaging and rehab. Following discussion, plan:  -Cast removed.  Discussed with him that sometimes radiographic healing trails clinical healing.  Crescencio is not having much pain, so we will keep it out of the cast.  However, he does have some stiffness/weakness so I recommend occupational therapy to assist with this.  Recommend daily range of motion exercises.  -Follow up in one month for re-evaluation.         Jessie Dunn MD, CAQ  [facility]

## 2017-06-21 ENCOUNTER — TELEPHONE (OUTPATIENT)
Dept: FAMILY MEDICINE | Facility: OTHER | Age: 32
End: 2017-06-21

## 2017-06-21 NOTE — TELEPHONE ENCOUNTER
Crescencio Moncada is a 31 year old male who calls with chest pain.    NURSING ASSESSMENT:  Description:  Was seen in the ED last night when he also blacked out, completed EKG and CXR. Was seen in [facility]. Also still experiencing a rash that occurs with the chest pain and heart fluttering. Rash usually lasts for about 2 weeks and then resolves. Has never had allergy testing. Patient requesting a follow up appointment to be seen since ER visit and to have STD testing completed for Chlamydia. Denies active chest pain.   Onset/duration:  Intermittent, worsening  Last exam/Treatment:  01/09/2017  Allergies: No Known Allergies    NURSING PLAN: Nursing advice to patient to have a follow up appointment to follow up with ED visit    RECOMMENDED DISPOSITION:  To be seen in clinic  Will comply with recommendation: Yes  If further questions/concerns or if symptoms do not improve, worsen or new symptoms develop, call your PCP or [facility] as soon as possible.    NOTES:  Disposition was determined by the first positive assessment question, therefore all previous assessment questions were negative    Guideline used:  Telephone Triage Protocols for Nurses, Fourth Edition, Mayra Kevin  Chest Pain  Nursing Judgment    Nandini Bae RN, BSN

## 2017-06-21 NOTE — PROGRESS NOTES
"  SUBJECTIVE:                                                    Crescencio Moncada is a 31 year old male who presents to clinic today for the following health issues:    HPI    ED/UC Followup:    Facility:  [facility]  Date of visit: 6/23/2017  Reason for visit: Palpitations, shortness of breath, nausea  Current Status: \"Throughout the day it will happen once or twice, but when laying down, it will happen mostly, varies in severity\", feels \"not too bad\"     Had 2 really bad episodes this past week that prompted ER visits both times.    1st episode - last week Tuesday, was lying down, heart starting to skip beats then a hard forceful beat, stood up, dizzy, sweating, lightheaded, nauseous, then it felt like heart rate slowed down quite a bit. Went to [facility] ER.    2nd episode - [place] [facility] - Friday - at work, heart palpitations followed by lightheadedness, hard to catch breath, sweaty.  Went to ER and was tested for blood clot, CBC, CMP, ECG - no acute findings. Had Holter monitor set up and did not have an episode until 2 hours after monitor was taken off.     Further HPI: He has not fainted but has come close, gets dazed, shaky, sweaty, after heart starts beating irregularly.  Pt states that within the last year, he has felt like someone has kicked him in the chest, a hard shock or squeeze 6-7 times.  Has to lie down to feel better. Happens both at work and at home, day and night, no pattern, no aggravating factors. Does not physically exert self to know if happens more with exertion.       ROS: No changes in stress or physical activity.  Denies joint pain, shortness of breath outside of episodes, changes in appetite, bowel movements or urinary habits, lightheadedness or dizziness outside of episodes, joint or muscle pain.  POSITIVE for more tired these days, episodes take up to a day to recover from    Rash - left palm, right inner forearm, right inner ankle, resolves and returns coupled with " "palpitations.  Started on under arms, upper. No known allergies. 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  No other cardiovascular history, mom and dad are healthy, \"everyone else lives into their 90s\"    Problem list and histories reviewed & adjusted, as indicated.  Additional history: as documented    Labs reviewed in EPIC    ROS:  Constitutional, HEENT, cardiovascular, pulmonary, gi and gu systems are negative, except as otherwise noted.    OBJECTIVE:     /76 (BP Location: Right arm, Patient Position: Chair, Cuff Size: Adult Large)  Pulse 72  Temp 98  F (36.7  C) (Oral)  Resp 16  Ht 6' 0.4\" (1.839 m)  Wt 211 lb 9.6 oz (96 kg)  BMI 28.38 kg/m2  Body mass index is 28.38 kg/(m^2).  GENERAL: healthy, alert and no distress  EYES: Eyes grossly normal to inspection, PERRL and conjunctivae and sclerae normal  HENT: ear canals and TM's normal, nose and mouth without ulcers or lesions  NECK: no adenopathy, no asymmetry, masses, or scars and thyroid normal to palpation  RESP: lungs clear to auscultation - no rales, rhonchi or wheezes  CV: heart auscultated both sitting and lying down - both showed regular rate and rhythm, normal S1 S2, no S3 or S4, no murmur, click or rub, no peripheral edema and peripheral pulses strong  ABDOMEN: soft, nontender, no hepatosplenomegaly, no masses and bowel sounds normal  MS: no gross musculoskeletal defects noted, no edema  SKIN: maculopapular rough rash on right inner forearm and right inner ankle, left palm with hyperkeratotic skin and fissures, no erythema  NEURO: Normal strength and tone, mentation intact and speech normal  PSYCH: mentation appears normal, affect normal/bright  LYMPH: no cervical, supraclavicular, axillary, or inguinal adenopathy    Diagnostic Test Results:  Pending - ESR, CRP, Lyme's, Gonorrhea, Syphilis, LDH  Zio " Patch  Stress Echo    ASSESSMENT/PLAN:     1. Palpitations  Patient presents with a one year history of palpitations and presyncope that recur and subside.  Patient also has a rash on right inner forearm, left palm and right inner ankle that coincide with episodes of palpitations and presyncope. The palpitations do not coincide with position, hunger, anxiety, stress, physical activity. He has a family history of paternal grandfather with MI at age 46.  He has no history of Lyme's exposure/tick bite but we will test for this considering possible diagnosis of disseminated Lyme's presenting with cardiac manifestations.  Will also do ESR and CRP to rule out inflammatory process/autoimmune dysfunction. Will screen for gonorrhea and syphillis as these can present with rashes and systemic manifestations although these are very far out on the differential.  When he initially presented 6 months ago he had a stiffened neck and left sided vagus nerve weakness so MRI was completed and this was normal. He no longer has these symptoms. I consulted with Dr. Laughlin in cardiology to see if he was aware of any cardiologic conditions that coincide with presentation of rash.  He could not think of any such conditions outside of Kawasaki which is a childhood illness and patient not presenting with these symptoms or Lymes which we are testing for.  I asked that his echo be done today as I am very concerned about dysrhythmia.  He was placed on a Holter monitor over the weekend by [facility] but did not have an episode so I have also asked him to get the Zio Patch for 7 days of monitoring to ensure we capture an episode.  Also referred patient to cardiology consult.      2. Chest pain, unspecified type  - Lyme Disease Suha with reflex to WB Serum  - CARDIOLOGY EVAL ADULT REFERRAL  - Zio Patch Holter; Future  - ESR: Erythrocyte sedimentation rate  - CRP, inflammation  - Lactate Dehydrogenase  - Exercise Stress Echocardiogram;  Future    3. Pre-syncope  - Lyme Disease Suha with reflex to WB Serum  - CARDIOLOGY EVAL ADULT REFERRAL  - Zio Patch Holter; Future  - ESR: Erythrocyte sedimentation rate  - CRP, inflammation  - Lactate Dehydrogenase  - Exercise Stress Echocardiogram; Future    4. Rash  - Lyme Disease Suha with reflex to WB Serum  - ESR: Erythrocyte sedimentation rate  - CRP, inflammation  - Lactate Dehydrogenase  - NEISSERIA GONORRHOEA PCR  - Anti Treponema    5. Family history of premature coronary heart disease  - CARDIOLOGY EVAL ADULT REFERRAL  - Zio Patch Holter; Future  - Exercise Stress Echocardiogram; Future    MAGALIS Ortega [facility]

## 2017-06-21 NOTE — TELEPHONE ENCOUNTER
Pt was seen back in December 2016 for some chest pain issues and was told he should see a heart doctor. He was unable to go at that time due to insurance reasons but is now ready to do so.  He would like to know whom he should see and where?

## 2017-06-22 ENCOUNTER — TELEPHONE (OUTPATIENT)
Dept: FAMILY MEDICINE | Facility: OTHER | Age: 32
End: 2017-06-22

## 2017-06-22 NOTE — TELEPHONE ENCOUNTER
": 1985  PHONE #'s: [phone] (home)     PRESENTING PROBLEM:  Noticing a rapid irregular heart beat for the past 15 minutes. \" I am nauseated, dizzy, SOB, cold sweat. I am very weak. I can't stand up. \"     NURSING ASSESSMENT  Description:   As above.   Onset/duration:   15 minutes ago.   Precip. factors:  Had a recent episode in [place] last week and was seen in the hospital there. Has an upcoming appt on Monday , but having another funny episode.   Assoc. Sx:  Weak, feels like he might pass out if he stands up.   Improves/worsens Sx:  same  Pain scale (1-10)   NA. More concerned about the heart rate and how he is feeling.   Sx specific meds:  None  Last exam/Tx: Ws seen lat week in [place] ER.    RECOMMENDED DISPOSITION:  Call 911 -  Will comply with recommendation: YES  If further questions/concerns or if Sx do not improve, worsen or new Sx develop, call your PCP or [facility] as soon as possible.    NOTES:  Disposition was determined by the first positive assessment question, therefore all previous assessment questions were negative.  Informed to check provider manual or call insurance company to assure coverage.    Guideline used: Heart Rate Problems  Telephone Triage Protocols for Nurses, Fifth Edition, Mayra Wilde RN  2017    "

## 2017-06-26 ENCOUNTER — HOSPITAL ENCOUNTER (OUTPATIENT)
Dept: CARDIOLOGY | Facility: CLINIC | Age: 32
End: 2017-06-26
Attending: STUDENT IN AN ORGANIZED HEALTH CARE EDUCATION/TRAINING PROGRAM
Payer: COMMERCIAL

## 2017-06-26 ENCOUNTER — PRE VISIT (OUTPATIENT)
Dept: CARDIOLOGY | Facility: CLINIC | Age: 32
End: 2017-06-26

## 2017-06-26 ENCOUNTER — HOSPITAL ENCOUNTER (OUTPATIENT)
Dept: CARDIOLOGY | Facility: CLINIC | Age: 32
Discharge: HOME | End: 2017-06-26
Attending: STUDENT IN AN ORGANIZED HEALTH CARE EDUCATION/TRAINING PROGRAM | Admitting: STUDENT IN AN ORGANIZED HEALTH CARE EDUCATION/TRAINING PROGRAM
Payer: COMMERCIAL

## 2017-06-26 ENCOUNTER — TELEPHONE (OUTPATIENT)
Dept: FAMILY MEDICINE | Facility: OTHER | Age: 32
End: 2017-06-26

## 2017-06-26 ENCOUNTER — OFFICE VISIT (OUTPATIENT)
Dept: FAMILY MEDICINE | Facility: OTHER | Age: 32
End: 2017-06-26
Payer: COMMERCIAL

## 2017-06-26 VITALS
TEMPERATURE: 98 F | BODY MASS INDEX: 28.66 KG/M2 | HEART RATE: 72 BPM | RESPIRATION RATE: 16 BRPM | HEIGHT: 72 IN | DIASTOLIC BLOOD PRESSURE: 76 MMHG | SYSTOLIC BLOOD PRESSURE: 128 MMHG | WEIGHT: 211.6 LBS

## 2017-06-26 DIAGNOSIS — R55 PRE-SYNCOPE: ICD-10-CM

## 2017-06-26 DIAGNOSIS — R07.9 CHEST PAIN, UNSPECIFIED TYPE: ICD-10-CM

## 2017-06-26 DIAGNOSIS — R07.9 CHEST PAIN, UNSPECIFIED TYPE: Primary | ICD-10-CM

## 2017-06-26 DIAGNOSIS — Z82.49 FAMILY HISTORY OF PREMATURE CORONARY HEART DISEASE: ICD-10-CM

## 2017-06-26 DIAGNOSIS — R21 RASH: ICD-10-CM

## 2017-06-26 DIAGNOSIS — R00.2 PALPITATIONS: ICD-10-CM

## 2017-06-26 LAB
CRP SERPL-MCNC: <2.9 MG/L (ref 0–8)
ERYTHROCYTE [SEDIMENTATION RATE] IN BLOOD BY WESTERGREN METHOD: 3 MM/H (ref 0–15)

## 2017-06-26 PROCEDURE — 93325 DOPPLER ECHO COLOR FLOW MAPG: CPT | Mod: 26 | Performed by: INTERNAL MEDICINE

## 2017-06-26 PROCEDURE — 93350 STRESS TTE ONLY: CPT | Mod: 26 | Performed by: INTERNAL MEDICINE

## 2017-06-26 PROCEDURE — 0298T ZZC EXT ECG > 48HR TO 21 DAY REVIEW AND INTERPRETATN: CPT | Performed by: INTERNAL MEDICINE

## 2017-06-26 PROCEDURE — 85652 RBC SED RATE AUTOMATED: CPT | Performed by: STUDENT IN AN ORGANIZED HEALTH CARE EDUCATION/TRAINING PROGRAM

## 2017-06-26 PROCEDURE — 86140 C-REACTIVE PROTEIN: CPT | Performed by: STUDENT IN AN ORGANIZED HEALTH CARE EDUCATION/TRAINING PROGRAM

## 2017-06-26 PROCEDURE — 93017 CV STRESS TEST TRACING ONLY: CPT | Performed by: REHABILITATION PRACTITIONER

## 2017-06-26 PROCEDURE — 86780 TREPONEMA PALLIDUM: CPT | Performed by: STUDENT IN AN ORGANIZED HEALTH CARE EDUCATION/TRAINING PROGRAM

## 2017-06-26 PROCEDURE — 40000264 ECHO STRESS WITH OPTISON

## 2017-06-26 PROCEDURE — 93018 CV STRESS TEST I&R ONLY: CPT | Performed by: INTERNAL MEDICINE

## 2017-06-26 PROCEDURE — 93016 CV STRESS TEST SUPVJ ONLY: CPT | Performed by: INTERNAL MEDICINE

## 2017-06-26 PROCEDURE — 25500064 ZZH RX 255 OP 636: Performed by: INTERNAL MEDICINE

## 2017-06-26 PROCEDURE — 99215 OFFICE O/P EST HI 40 MIN: CPT | Performed by: STUDENT IN AN ORGANIZED HEALTH CARE EDUCATION/TRAINING PROGRAM

## 2017-06-26 PROCEDURE — 86618 LYME DISEASE ANTIBODY: CPT | Performed by: STUDENT IN AN ORGANIZED HEALTH CARE EDUCATION/TRAINING PROGRAM

## 2017-06-26 PROCEDURE — 36415 COLL VENOUS BLD VENIPUNCTURE: CPT | Performed by: STUDENT IN AN ORGANIZED HEALTH CARE EDUCATION/TRAINING PROGRAM

## 2017-06-26 PROCEDURE — 87591 N.GONORRHOEAE DNA AMP PROB: CPT | Performed by: STUDENT IN AN ORGANIZED HEALTH CARE EDUCATION/TRAINING PROGRAM

## 2017-06-26 PROCEDURE — 0296T ZIO PATCH HOLTER: CPT

## 2017-06-26 PROCEDURE — 93321 DOPPLER ECHO F-UP/LMTD STD: CPT | Mod: 26 | Performed by: INTERNAL MEDICINE

## 2017-06-26 RX ADMIN — HUMAN ALBUMIN MICROSPHERES AND PERFLUTREN 3 ML: 10; .22 INJECTION, SOLUTION INTRAVENOUS at 14:44

## 2017-06-26 ASSESSMENT — PAIN SCALES - GENERAL: PAINLEVEL: NO PAIN (0)

## 2017-06-26 NOTE — TELEPHONE ENCOUNTER
Call out to cardiology in [place]. Please pull me from patient room if he calls back.  Apryl Correia NP-C

## 2017-06-26 NOTE — NURSING NOTE
"Chief Complaint   Patient presents with     Hospital F/U     Panel Management     tdap       Initial /76 (BP Location: Right arm, Patient Position: Chair, Cuff Size: Adult Large)  Pulse 72  Temp 98  F (36.7  C) (Oral)  Resp 16  Ht 6' 0.4\" (1.839 m)  Wt 211 lb 9.6 oz (96 kg)  BMI 28.38 kg/m2 Estimated body mass index is 28.38 kg/(m^2) as calculated from the following:    Height as of this encounter: 6' 0.4\" (1.839 m).    Weight as of this encounter: 211 lb 9.6 oz (96 kg).  Medication Reconciliation: complete   Jennifer Huntley CMA      "

## 2017-06-26 NOTE — MR AVS SNAPSHOT
After Visit Summary   6/26/2017    Crescencio Moncada    MRN: [id number]           Patient Information     Date Of Birth          1985        Visit Information        Provider Department      6/26/2017 8:30 AM Apryl Correia APRN [facility]        Today's Diagnoses     Chest pain, unspecified type    -  1    Palpitations        Pre-syncope        Rash        Family history of premature coronary heart disease          Care Instructions    Echocardiogram    Lab work    Cardiology consult    Heart monitor - Zio patch for 7 days.    Apryl Correia, DAIJA-C            Follow-ups after your visit        Additional Services     CARDIOLOGY EVAL ADULT REFERRAL       Your provider has referred you to:  FMG: [facility] [phone]   [email or web address]  UMP: [facility] [phone]   [email or web address]    Please be aware that coverage of these services is subject to the terms and limitations of your health insurance plan.  Call member services at your health plan with any benefit or coverage questions.      Type of Referral:  New Cardiology Consult    Timeframe requested:  Within one week    Please bring the following to your appointment:  >>   Any x-rays, CTs or MRIs which have been performed.  Contact the facility where they were done to arrange for  prior to your scheduled appointment.    >>   List of current medications  >>   This referral request   >>   Any documents/labs given to you for this referral                  Your next 10 appointments already scheduled     Jun 26, 2017  3:00 PM CDT   ZIOPATCH MONITOR with PH EVENT/HOLTER MONITOR   [facility] ([facility])    [address] [place] [address]  "[id number]   [phone]            2017  2:30 PM CDT   [facility]    [address] [address] [place] [id number] [phone]              Future tests that were ordered for you today     Open Future Orders        Priority Expected Expires Ordered    Zio Patch Holter Routine  8/10/2017 2017    Exercise Stress Echocardiogram Routine  2018            Who to contact     If you have questions or need follow up information about today's clinic visit or your schedule please contact [facility] directly at [phone].  Normal or non-critical lab and imaging results will be communicated to you by MyChart, letter or phone within 4 business days after the clinic has received the results. If you do not hear from us within 7 days, please contact the clinic through [employer] or phone. If you have a critical or abnormal lab result, we will notify you by phone as soon as possible.  Submit refill requests through [employer] or call your pharmacy and they will forward the refill request to us. Please allow 3 business days for your refill to be completed.          Additional Information About Your Visit        [employer] Information     Fan[employer] lets you send messages to your doctor, view your test results, renew your prescriptions, schedule appointments and more. To sign up, go to [email or web address] . Click on \"Log in\" on the left side of the screen, which will take you to the Welcome page. Then click on \"Sign up Now\" on the right side of the page.     You will be asked to enter the access code listed below, as well as some personal information. Please follow the directions to create your username and password.     Your access code is: [email or web address]  Expires: 2017  2:28 PM     Your access code will  in 90 days. If you need help or a new code, please call your [facility] or " "[phone].        Care EveryWhere ID     This is your Care EveryWhere ID. This could be used by other organizations to access your [place] medical records  [id number]        Your Vitals Were     Pulse Temperature Respirations Height BMI (Body Mass Index)       72 98  F (36.7  C) (Oral) 16 6' 0.4\" (1.839 m) 28.38 kg/m2        Blood Pressure from Last 3 Encounters:   06/26/17 128/76   01/09/17 144/70   12/16/16 136/70    Weight from Last 3 Encounters:   06/26/17 211 lb 9.6 oz (96 kg)   02/17/17 221 lb 3.2 oz (100.3 kg)   01/31/17 217 lb (98.4 kg)              We Performed the Following     Anti Treponema     CARDIOLOGY EVAL ADULT REFERRAL     CRP, inflammation     ESR: Erythrocyte sedimentation rate     Lyme Disease Suha with reflex to WB Serum     NEISSERIA GONORRHOEA PCR        Primary Care Provider Office Phone # Fax #    Apryl Cassia Correia, APRN [facility] [phone]       [address]  [facility] [address]        Equal Access to Services     DMITRY [facility] HospitalLYDIA : Hadii aad ku hadasho Soomaali, waaxda luqadaha, qaybta kaalmada adeegyada, patricia ocasio hayronnelln tosha zeng . So [facility] [phone].    ATENCIÓN: Si habla español, tiene a mcneal disposición servicios gratuitos de asistencia lingüística. LlHolzer [facility] – Jackson [phone].    We comply with applicable federal civil rights laws and [place] laws. We do not discriminate on the basis of race, color, national origin, age, disability sex, sexual orientation or gender identity.            Thank you!     Thank you for choosing [facility]  for your care. Our goal is always to provide you with excellent care. Hearing back from our patients is one way we can continue to improve our services. Please take a few minutes to complete the written survey that you may receive in the mail after your visit with us. Thank you!             Your Updated Medication List - Protect others around you: Learn how to safely use, " store and throw away your medicines at www.disposemymeds.org.      Notice  As of 6/26/2017  2:28 PM    You have not been prescribed any medications.

## 2017-06-26 NOTE — PATIENT INSTRUCTIONS
Echocardiogram    Lab work    Cardiology consult    Heart monitor - Zio patch for 7 days.    STELLA MaddenC

## 2017-06-26 NOTE — TELEPHONE ENCOUNTER
PREVISIT INFORMATION                                                    Crescencio Coral scheduled for future visit at [facility] specialty clinics.    Patient is scheduled to see Dr. Jackson on 06/27/2017  Reason for visit: Chest pain  Referring provider Apryl Correia APRN CNP on  Has patient seen previous specialist? No  Medical Records:  Available in chart.  Patient was previously seen at a [place] or [facility] facility. Care Everywhere [facility]    REVIEW                                                      New patient packet mailed to patient: N/A  Medication reconciliation complete: Yes      Current Outpatient Prescriptions   Medication Sig Dispense Refill     ketoconazole (NIZORAL) 2 % cream Apply topically 2 times daily (Patient not taking: Reported on 6/26/2017) 30 g 1     triamcinolone (KENALOG) 0.1 % cream Apply sparingly to affected area three times daily for 14 days. (Patient not taking: Reported on 6/26/2017) 30 g 0       Allergies: Review of patient's allergies indicates no known allergies.        PLAN/FOLLOW-UP NEEDED                                                      Previsit review complete.  Patient will see provider at future scheduled appointment.     Patient Reminders Given:  Please, make sure you bring an updated list of your medications.   If you are having a procedure, please, present 15 minutes early.  If you need to cancel or reschedule,please call [phone].    Aliya Del Valle CMA

## 2017-06-27 ENCOUNTER — TELEPHONE (OUTPATIENT)
Dept: FAMILY MEDICINE | Facility: OTHER | Age: 32
End: 2017-06-27

## 2017-06-27 ENCOUNTER — OFFICE VISIT (OUTPATIENT)
Dept: CARDIOLOGY | Facility: CLINIC | Age: 32
End: 2017-06-27
Attending: STUDENT IN AN ORGANIZED HEALTH CARE EDUCATION/TRAINING PROGRAM
Payer: COMMERCIAL

## 2017-06-27 VITALS
DIASTOLIC BLOOD PRESSURE: 83 MMHG | SYSTOLIC BLOOD PRESSURE: 146 MMHG | HEART RATE: 73 BPM | WEIGHT: 214.6 LBS | OXYGEN SATURATION: 100 % | BODY MASS INDEX: 28.78 KG/M2

## 2017-06-27 DIAGNOSIS — R00.2 PALPITATIONS: Primary | ICD-10-CM

## 2017-06-27 DIAGNOSIS — R07.9 CHEST PAIN, UNSPECIFIED TYPE: ICD-10-CM

## 2017-06-27 LAB
B BURGDOR IGG+IGM SER QL: 0.12 (ref 0–0.89)
N GONORRHOEA DNA SPEC QL NAA+PROBE: NORMAL
SPECIMEN SOURCE: NORMAL
T PALLIDUM IGG+IGM SER QL: NEGATIVE

## 2017-06-27 PROCEDURE — 93000 ELECTROCARDIOGRAM COMPLETE: CPT | Performed by: INTERNAL MEDICINE

## 2017-06-27 PROCEDURE — 99214 OFFICE O/P EST MOD 30 MIN: CPT | Mod: 25 | Performed by: INTERNAL MEDICINE

## 2017-06-27 ASSESSMENT — PAIN SCALES - GENERAL: PAINLEVEL: MILD PAIN (2)

## 2017-06-27 NOTE — PROGRESS NOTES
"Dictation system down, this note represents a limited second version of the comprehensive official record dictated previously    PMH:    Hand fx in past    HPI:    First episode near syncope 1 year ago.  Having conversation in sitting position.  Started \"fading in and out\", became diaphoretic then episode of near syncope.    More recently has had episodes of palpitations aggravated by lying down.  Feels \"slow beats\" then some quicker irregular beats.  Then if stands up will feel extremely dizzy to the point where he no longer able to function.  Has noted worsening symptoms over the last 4 weeks.  Episodes last anywhere from 2min to 30 min.    Symptoms are interfering with daily life activities to the point where he cannot function normally at work.    He otherwise has an active job, and when he is feeling well he is unlimited in terms of exertional capacity.  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    Patient specifically denies any significant chest pain, shortness of breath, dyspnea on exertion, PND, orthopnea, lower extremity edema    Drinks at least one gallon of water/day, urine clear    Cardiac risk factors:    No significant history of diabetes, hypertension, family history of premature CAD, or hyperlipidemia    Positive for chewing tobacco 8 years, drinks 2 beers/d    MEDICATIONS:  As listed in medical record    PHYSICAL EXAM:    Vitals:  BP  146/83 Pulse _73 Weight 214 lbs  Neck:  No significant jugular venous distension, no significant hepatojugular reflux, peripheral veins do not remain engorged above cardiac level  Chest:  Lungs are clear to auscultation, respiratory effort is normal  Cardiac:  Regular rate and rhythm, no significant murmur or gallop appreciated  Abdomen:  Soft, non-tender, positive bowel sounds  Extremities:  Without significant edema    Labs:     12/16/16 K 4.7   GFR NL, HGB nl, TSH nl    Echo 6/26/17:  Normal LV " systolic function, no sig valve disease      IMPRESSION:    31 y.o. Without prior history of cardiac disease, presents with 4 weeks history of palpitations.  Episodes quite symptomatic, causing dizziness such that it interferes with patient's daily life and has difficult time functioning normally at work.    Echo reveals structurally normal heart, labs in Dec 2016 normal.  Patient already has monitor on.    PLAN:    1.  Would like to review Ziopatch monitor results.  May consider further referral pending results

## 2017-06-27 NOTE — TELEPHONE ENCOUNTER
KARYN for Pt to call back to clinic. Please see below     Notes Recorded by Apryl Correia APRN CNP on 6/27/2017 at 10:52 AM  Please call patient and let him know his Lyme's test, syphillis, and inflammatory markers were all normal.

## 2017-06-27 NOTE — MR AVS SNAPSHOT
After Visit Summary   6/27/2017    Crescencio Moncada    MRN: [id number]           Patient Information     Date Of Birth          1985        Visit Information        Provider Department      6/27/2017 2:30 PM Luis Jackson MD [facility]        Today's Diagnoses     Palpitations    -  1    Chest pain, unspecified type           Follow-ups after your visit        Future tests that were ordered for you today     Open Future Orders        Priority Expected Expires Ordered    Zio Patch Holter Routine  8/10/2017 6/26/2017    ECHO STRESS WITH OPTISON Routine  6/26/2018 6/26/2017            Who to contact     If you have questions or need follow up information about today's clinic visit or your schedule please contact [facility] directly at [phone].  Normal or non-critical lab and imaging results will be communicated to you by MyChart, letter or phone within 4 business days after the clinic has received the results. If you do not hear from us within 7 days, please contact the clinic through MyChart or phone. If you have a critical or abnormal lab result, we will notify you by phone as soon as possible.  Submit refill requests through [employer] or call your pharmacy and they will forward the refill request to us. Please allow 3 business days for your refill to be completed.          Additional Information About Your Visit        [employer] Information     [employer] is an electronic gateway that provides easy, online access to your medical records. With [employer], you can request a clinic appointment, read your test results, renew a prescription or communicate with your care team.     To sign up for [employer] visit the website at [email or web address]   You will be asked to enter the access code listed below, as well as some personal information. Please follow the directions to create your username and password.     Your access code is: [email or web address]  Expires: 9/24/2017   2:28 PM     Your access code will  in 90 days. If you need help or a new code, please contact your [facility] or call [phone] for assistance.        Care EveryWhere ID     This is your Care EveryWhere ID. This could be used by other organizations to access your [place] medical records  [id number]        Your Vitals Were     Pulse Pulse Oximetry BMI (Body Mass Index)             73 100% 28.78 kg/m2          Blood Pressure from Last 3 Encounters:   17 146/83   17 128/76   17 144/70    Weight from Last 3 Encounters:   17 97.3 kg (214 lb 9.6 oz)   17 96 kg (211 lb 9.6 oz)   17 100.3 kg (221 lb 3.2 oz)              We Performed the Following     EKG 12-lead complete w/read - Clinics        Primary Care Provider Office Phone # Fax #    Apryl MAGALIS [facility] [phone]       [facility] [address]  [facility] [address]        Equal Access to Services     ELOISA PECK : Hadii aad ku hadasho Soomaali, waaxda luqadaha, qaybta kaalmada adeegyada, patricia zeng . So [facility] [phone].    ATENCIÓN: Si habla español, tiene a mcneal disposición servicios gratuitos de asistencia lingüística. [facility] [phone].    We comply with applicable federal civil rights laws and [place] laws. We do not discriminate on the basis of race, color, national origin, age, disability sex, sexual orientation or gender identity.            Thank you!     Thank you for choosing [facility]  for your care. Our goal is always to provide you with excellent care. Hearing back from our patients is one way we can continue to improve our services. Please take a few minutes to complete the written survey that you may receive in the mail after your visit with us. Thank you!             Your Updated Medication List - Protect others around you: Learn how to safely use, store and throw  away your medicines at www.disposemymeds.org.      Notice  As of 6/27/2017  3:53 PM    You have not been prescribed any medications.

## 2017-06-27 NOTE — NURSING NOTE
"Crescencio Moncada's goals for this visit include:   Chief Complaint   Patient presents with     Consult     Chest pain       He requests these members of his care team be copied on today's visit information: PCP    PCP: Apryl Correia    Referring Provider:  MAGALIS Cook [facility]  [address]  [place] [address]    Chief Complaint   Patient presents with     Consult     Chest pain       Initial /83 (BP Location: Right arm, Patient Position: Chair, Cuff Size: Adult Regular)  Pulse 73  Wt 97.3 kg (214 lb 9.6 oz)  SpO2 100%  BMI 28.78 kg/m2 Estimated body mass index is 28.78 kg/(m^2) as calculated from the following:    Height as of 6/26/17: 1.839 m (6' 0.4\").    Weight as of this encounter: 97.3 kg (214 lb 9.6 oz).  Medication Reconciliation: complete       Medication Refills: none        Aliya Del Valle CMA      "

## 2017-07-16 ENCOUNTER — TRANSFERRED RECORDS (OUTPATIENT)
Dept: HEALTH INFORMATION MANAGEMENT | Facility: CLINIC | Age: 32
End: 2017-07-16

## 2017-07-21 ENCOUNTER — TELEPHONE (OUTPATIENT)
Dept: FAMILY MEDICINE | Facility: OTHER | Age: 32
End: 2017-07-21

## 2017-07-21 NOTE — TELEPHONE ENCOUNTER
Patient called to schedule appointment for a hospital follow-up    Patient was admitted to [facility]  Discharged date: 07-21-17  Reason for hospital admission: Syncope  Does patient have future appointment scheduled with provider? Yes Golden Evans  Date of future appointment: 7-26-17    This information will be used to help the care team plan for the patients upcoming visit.  The triage RN may determine that a follow up call is necessary and reach out to the patient via the phone number listed in the chart.     Please route this message on routine priority to the Triage RN pool.

## 2017-07-24 ENCOUNTER — RADIANT APPOINTMENT (OUTPATIENT)
Dept: GENERAL RADIOLOGY | Facility: OTHER | Age: 32
End: 2017-07-24
Attending: STUDENT IN AN ORGANIZED HEALTH CARE EDUCATION/TRAINING PROGRAM
Payer: COMMERCIAL

## 2017-07-24 ENCOUNTER — OFFICE VISIT (OUTPATIENT)
Dept: FAMILY MEDICINE | Facility: OTHER | Age: 32
End: 2017-07-24
Payer: COMMERCIAL

## 2017-07-24 VITALS
WEIGHT: 210.2 LBS | OXYGEN SATURATION: 98 % | HEIGHT: 72 IN | BODY MASS INDEX: 28.47 KG/M2 | TEMPERATURE: 98.3 F | DIASTOLIC BLOOD PRESSURE: 58 MMHG | SYSTOLIC BLOOD PRESSURE: 122 MMHG | HEART RATE: 83 BPM | RESPIRATION RATE: 12 BRPM

## 2017-07-24 DIAGNOSIS — K59.00 CONSTIPATION, UNSPECIFIED CONSTIPATION TYPE: ICD-10-CM

## 2017-07-24 DIAGNOSIS — K59.00 CONSTIPATION, UNSPECIFIED CONSTIPATION TYPE: Primary | ICD-10-CM

## 2017-07-24 PROCEDURE — 99214 OFFICE O/P EST MOD 30 MIN: CPT | Performed by: STUDENT IN AN ORGANIZED HEALTH CARE EDUCATION/TRAINING PROGRAM

## 2017-07-24 PROCEDURE — 74020 XR ABDOMEN 2 VW: CPT

## 2017-07-24 RX ORDER — BISACODYL 10 MG/1
1 SUPPOSITORY RECTAL ONCE
Qty: 1 BOTTLE | Refills: 0 | OUTPATIENT
Start: 2017-07-24 | End: 2017-07-24

## 2017-07-24 ASSESSMENT — PAIN SCALES - GENERAL: PAINLEVEL: SEVERE PAIN (6)

## 2017-07-24 NOTE — NURSING NOTE
"Chief Complaint   Patient presents with     Constipation       Initial /58 (BP Location: Left arm, Patient Position: Chair, Cuff Size: Adult Large)  Pulse 83  Temp 98.3  F (36.8  C) (Oral)  Resp 12  Ht 6' 0.4\" (1.839 m)  Wt 210 lb 3.2 oz (95.3 kg)  SpO2 98%  BMI 28.19 kg/m2 Estimated body mass index is 28.19 kg/(m^2) as calculated from the following:    Height as of this encounter: 6' 0.4\" (1.839 m).    Weight as of this encounter: 210 lb 3.2 oz (95.3 kg).  Medication Reconciliation: complete   Jennifer Huntley CMA      "

## 2017-07-24 NOTE — TELEPHONE ENCOUNTER
Follow up encounter documentation:    Reason for follow up: Crescencio Moncada appeared on our list for recent discharge from an Emergency Room, inpatient admission, or TCU/nursing home facility.    Visit date: 7/21/2017  Location: [facility]   Reason for visit: Syncope   Discharge instructions include: Follow up in clinic.   Follow up phone call indicated? Yes. Details: patient states that he is to follow up in clinic after he gets the results of his urine test. Appt for Wed 7/26/2017 was made for him by the hospital. Patient is requesting to cancel this and he will reschedule this if needed. No further questions or concerns.   Patricia Liz RN

## 2017-07-24 NOTE — PATIENT INSTRUCTIONS
Magnesium Citrate - drink one bottle of magnesium citrate.    You may continue to do Fleet's enema at home as needed and/or glycerin suppositories.    After you feel like you have cleaned out your system, continue to take Miralax one capful daily, be sure to drink a lot of water 8-10 glasses per day.    Apryl Correia, NP-C  [phone]

## 2017-07-24 NOTE — NURSING NOTE
Saline Laxative administered to patient per EM order.   The following medication was given:     MEDICATION: Saline Laxative 4.5 fluid ounces  DOSE: 4.5mL  LOT #: [id number]  :  Jacinta Diego  EXPIRATION DATE:  01/2020    Administered at 4:50pm by Nandini Bae RN, BSN

## 2017-07-24 NOTE — MR AVS SNAPSHOT
"              After Visit Summary   7/24/2017    Crescencio Moncada    MRN: [id number]           Patient Information     Date Of Birth          1985        Visit Information        Provider Department      7/24/2017 3:10 PM Apryl Correia APRN [facility]        Today's Diagnoses     Constipation, unspecified constipation type    -  1      Care Instructions    Magnesium Citrate - drink one bottle of magnesium citrate.    You may continue to do Fleet's enema at home as needed and/or glycerin suppositories.    After you feel like you have cleaned out your system, continue to take Miralax one capful daily, be sure to drink a lot of water 8-10 glasses per day.    Apryl Correia, NP-C  [phone]          Follow-ups after your visit        Who to contact     If you have questions or need follow up information about today's clinic visit or your schedule please contact [facility] directly at [phone].  Normal or non-critical lab and imaging results will be communicated to you by PayCliphart, letter or phone within 4 business days after the clinic has received the results. If you do not hear from us within 7 days, please contact the clinic through Humounot or phone. If you have a critical or abnormal lab result, we will notify you by phone as soon as possible.  Submit refill requests through JustParts or call your pharmacy and they will forward the refill request to us. Please allow 3 business days for your refill to be completed.          Additional Information About Your Visit        PayClipharInternet Pawn Information     JustParts lets you send messages to your doctor, view your test results, renew your prescriptions, schedule appointments and more. To sign up, go to [email or web address] . Click on \"Log in\" on the left side of the screen, which will take you to the Welcome page. Then click on \"Sign up Now\" on the right side of the page.     You will be asked to enter the access code " "listed below, as well as some personal information. Please follow the directions to create your username and password.     Your access code is: [email or web address]  Expires: 2017  2:28 PM     Your access code will  in 90 days. If you need help or a new code, please call your [facility] or [phone].        Care EveryWhere ID     This is your Care EveryWhere ID. This could be used by other organizations to access your [place] medical records  [id number]        Your Vitals Were     Pulse Temperature Respirations Height Pulse Oximetry BMI (Body Mass Index)    83 98.3  F (36.8  C) (Oral) 12 6' 0.4\" (1.839 m) 98% 28.19 kg/m2       Blood Pressure from Last 3 Encounters:   17 122/58   17 146/83   17 128/76    Weight from Last 3 Encounters:   17 210 lb 3.2 oz (95.3 kg)   17 214 lb 9.6 oz (97.3 kg)   17 211 lb 9.6 oz (96 kg)                 Today's Medication Changes          These changes are accurate as of: 17  4:39 PM.  If you have any questions, ask your nurse or doctor.               Start taking these medicines.        Dose/Directions    * RA SALINE ENEMA 19-7 GM/118ML Enem   Used for:  Constipation, unspecified constipation type   Started by:  Apryl Correia APRN CNP        Dose:  1 enema   Place 1 enema rectally once for 1 dose   Quantity:  1 Bottle   Refills:  0       * RA SALINE ENEMA 19-7 GM/118ML Enem   Used for:  Constipation, unspecified constipation type   Started by:  Apryl Correia APRN CNP        Dose:  1 enema   Place 1 enema rectally once for 1 dose   Quantity:  1 Bottle   Refills:  0       * Notice:  This list has 2 medication(s) that are the same as other medications prescribed for you. Read the directions carefully, and ask your doctor or other care provider to review them with you.         Where to get your medicines      Some of these will need a paper prescription and others can be bought over the counter.  Ask your " nurse if you have questions.     You don't need a prescription for these medications     RA SALINE ENEMA 19-7 GM/118ML Enem    RA SALINE ENEMA 19-7 GM/118ML Enem                Primary Care Provider Office Phone # Fax #    MAGALIS Cook CIERA [phone]       [facility] [address]  [facility] [address]        Equal Access to Services     ELOISA PECK AH: Hadii aad ku hadasho Soomaali, waaxda luqadaha, qaybta kaalmada adeegyada, waxay idiin hayaan adeeg kharash la'hao . So [facility] [phone].    ATENCIÓN: Si habla español, tiene a mcneal disposición servicios gratuitos de asistencia lingüística. [facility] [phone].    We comply with applicable federal civil rights laws and [place] laws. We do not discriminate on the basis of race, color, national origin, age, disability sex, sexual orientation or gender identity.            Thank you!     Thank you for choosing [facility]  for your care. Our goal is always to provide you with excellent care. Hearing back from our patients is one way we can continue to improve our services. Please take a few minutes to complete the written survey that you may receive in the mail after your visit with us. Thank you!             Your Updated Medication List - Protect others around you: Learn how to safely use, store and throw away your medicines at www.disposemymeds.org.          This list is accurate as of: 7/24/17  4:39 PM.  Always use your most recent med list.                   Brand Name Dispense Instructions for use Diagnosis    * RA SALINE ENEMA 19-7 GM/118ML Enem     1 Bottle    Place 1 enema rectally once for 1 dose    Constipation, unspecified constipation type       * RA SALINE ENEMA 19-7 GM/118ML Enem     1 Bottle    Place 1 enema rectally once for 1 dose    Constipation, unspecified constipation type       * Notice:  This list has 2 medication(s) that are the same as other medications prescribed for  you. Read the directions carefully, and ask your doctor or other care provider to review them with you.

## 2017-07-24 NOTE — PROGRESS NOTES
"  SUBJECTIVE:                                                    Crescencio Moncada is a 31 year old male who presents to clinic today for the following health issues:      HPI    Constipation     Onset: 12 days    Description:   Frequency of bowel movements: None in 12 days.  Stool consistency: None in 12 days    Progression of Symptoms:  worsening    Accompanying Signs & Symptoms:  Abdominal pain (cramping?): YES  Blood in stool: no   Rectal pain: no   Nausea/vomiting: YES- nausea comes and goes, \"feels like heartburn\"  Weight loss or gain: YES- \"maybe a little\"   History:   History of abdominal surgery: no     Precipitating factors:   Recent use of narcotics, anticholinergics, calcium channel blockers, antacids, or iron supplements: no   Chronic Laxative Use: no- Has been for the last few days         Therapies Tried and outcome: laxatives - does not help, Miralax, Dulcolax        Problem list and histories reviewed & adjusted, as indicated.  Additional history: as documented    Labs reviewed in EPIC    ROS:  Constitutional, HEENT, cardiovascular, pulmonary, gi and gu systems are negative, except as otherwise noted.      OBJECTIVE:   /58 (BP Location: Left arm, Patient Position: Chair, Cuff Size: Adult Large)  Pulse 83  Temp 98.3  F (36.8  C) (Oral)  Resp 12  Ht 6' 0.4\" (1.839 m)  Wt 210 lb 3.2 oz (95.3 kg)  SpO2 98%  BMI 28.19 kg/m2  Body mass index is 28.19 kg/(m^2).  GENERAL: alert and visibly distressed, grimacing, furrowed brow  NECK: no adenopathy, no asymmetry, masses, or scars and thyroid normal to palpation  RESP: lungs clear to auscultation - no rales, rhonchi or wheezes  CV: regular rate and rhythm, normal S1 S2, no S3 or S4, no murmur, click or rub, no peripheral edema and   ABDOMEN: tenderness generalized, no organomegaly or masses and diminished bowel sounds on left  RECTAL (male): normal sphincter tone, no rectal masses, no stool in rectal vaults  MS: no gross musculoskeletal defects noted, " no edema  SKIN: no suspicious lesions or rashes  NEURO: Normal strength and tone, mentation intact and speech normal  PSYCH: mentation appears normal, affect normal/bright    Diagnostic Test Results:  none     ASSESSMENT/PLAN:     1. Constipation, unspecified constipation type  Patient presents today not having a bowel movement for the past 12 days. He was hospitalized at [facility] for 8 days and released 3 days ago after a syncopal episode. He had contrast for abdominal CT on Friday and concerned that the contrast exacerbated constipation. I did a digital rectal exam and there is no stool in the rectal vaults. Abdominal x-ray showed no evidence of intestinal obstruction. Patient was extremely uncomfortable and agreed to enema here in clinic. He did have results with the first enema but not significantly so we repeated the enema and patient had further results. Instructed him to go home and use mag citrate for further bowel clean out plus fleets enema or suppositories as needed in addition to starting a regimen of MiraLAX one capful daily.  Encouraged water intake. Return to clinic if constipation persists or worsenser intake.   Patient Instructions:   Magnesium Citrate - drink one bottle of magnesium citrate.  You may continue to do Fleet's enema at home as needed and/or glycerin suppositories.  After you feel like you have cleaned out your system, continue to take Miralax one capful daily, be sure to drink a lot of water 8-10 glasses per day.    - XR Abdomen 2 Views; Future  - Sodium Phosphates (RA SALINE ENEMA) 19-7 GM/118ML ENEM; Place 1 enema rectally once for 1 dose  Dispense: 1 Bottle; Refill: 0  - Sodium Phosphates (RA SALINE ENEMA) 19-7 GM/118ML ENEM; Place 1 enema rectally once for 1 dose  Dispense: 1 Bottle; Refill: 0    MAGALIS Ortega [facility]

## 2017-07-25 NOTE — PROGRESS NOTES
Saline laxative was administered per VORB of Stephany Correia at 4 pm. Patient was able to hold contents for 2.5 minutes. Results unknown by this RN. Patient tolerated well with no known complications.    Racheal Clifford RN, BSN

## 2017-07-29 NOTE — PROGRESS NOTES
"cc:   Apryl Correia, nurse practitioner     WORKTYPE:  Clinic note  PATIENT:  Jose Moncada  MRN:  [id number]    DOS: 6/27/17        Dear Ms. Correia,      It was pleasure to participate in the care of your patient.  As you know, he is a 31-year-old gentleman who I had seen today for palpitations and presyncope.      His past medical history is significant for a hand fracture in the past.      He denies other significant hospitalizations or surgeries.  In terms of his present symptom complex, he says that about a year ago he was having a relaxed conversation in the sitting position without undue stress when he felt like he was \"fading in and out\" with lightheadedness, diaphoresis and near syncope.  Then about 9 months ago he says he started experiencing some form of rash, maculopapular in nature in his arms, inside of his feet, and hands.  Then he started noticing that whenever he laid down to relax, he was feeling his heart skip a couple beats and began having some palpitations.  Then when he stands up, he will feel dizzy, lightheaded, and near syncopal.  It lasts for about 2-30 minutes and he gets it once or twice a day and has gotten it every day for the last 4 weeks.  He feels it is getting worse and is interfering with his daily activities.     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  He denies having any significant limitations and he can lift and perform physical work without symptoms.  He denies chest pain or shortness of breath, PND, orthopnea or edema.  He denies an episode of true syncope.  He drinks over a gallon of water a day and his urine is clear.      In terms of his cardiac risk factors, no history of diabetes or hypertension.  He chews tobacco and has done so for 8 years.  He does not want to quit.  He drinks 2 beers a day at least.    His grandfather had a heart attack at age 46.  His cholesterol is \"okay.\"  He installs tile for a living.      His current " medications include none.      On physical exam, his blood pressure is 140/80 with a pulse of 73, his weight is 214 pounds.  Neck exam reveals no obvious jugular venous distention.  Lungs are clear to auscultation.  Respiratory rate was normal.  Cardiac exam was a regular rate and rhythm.  No obvious murmur or gallop appreciated.  Belly is soft, nontender.  Extremities without clubbing, cyanosis or edema.      EKG reveals normal sinus rhythm at a rate of 70 beats per minute.  No gross S-T changes.  Stress echo, 06/26/2017, reveals normal resting LV systolic function, no significant valvular pathology, no significant inducible ischemia, high workload.      Labs on 12/16/2016:  Potassium 4.6.  GFR, hemoglobin and TSH normal.      IMPRESSION:      The patient is a 31-year-old gentleman without a prior past medical history of cardiac disease who presents with episodes of palpitations and near syncope.  He has a structurally normal heart by stress echo, 06/26/2017.  He has no evidence for structural heart disease and no evidence for inducible ischemia.  He is asymptomatic at a high level of exertion.      Interestingly, he feels that his symptoms get worse or are triggered when he lays down and this is when he experiences his palpitations on a daily basis for the last month.      He feels the palpitations as a skip in heartbeats for anywhere from 2-30 minutes after lying down and when he stands up he will feel extremely dizzy.  He already has a monitor on today and gathering further information would obviously be helpful.      PLAN:      1.  We would obviously like to review the results of his outpatient monitor. Obviously, if it is abnormal, this will direct our further treatments and therapies.      If it is completely normal, then we may consider a referral to Dr. Esteves at the [facility] for additional possible workup and treatment.    Once again, it was a pleasure for us to take care of your patient.   Please feel free to contact me at any time if you have any questions regarding his care in the future.    Sincerely,      _____

## 2017-08-14 NOTE — PROGRESS NOTES
"  SUBJECTIVE:                                                    Crescencio Moncada is a 31 year old male who presents to clinic today for the following health issues:      HPI    ABDOMINAL   PAIN     Onset: 3 weeks    Description:   Character: Sharp and \"Feels like getting the wind knocked out of you\", cramping  Location: Right under ribs  Radiation: Back    Intensity: moderate    Progression of Symptoms:  Today seems ok, varies - some days it's all day long, some days it starts at noon    Accompanying Signs & Symptoms:  Fever/Chills?: no   Gas/Bloating: YES- Starts in the morning, always at the very top, underneath ribs, swelling  Nausea: YES- during the day  Vomitting: no   Diarrhea?: no   Constipation:YES- little  Dysuria or Hematuria: no    History:   Trauma: no   Previous similar pain: no    Previous tests done: x-ray    Precipitating factors:   Does the pain change with:     Food: YES- some days it makes it worse, some days can't eat     BM: YES- little    Urination: no     Alleviating factors:  Laying down    Therapies Tried and outcome: laying down, OTC - maalox,etc    LMP:  not applicable     Intermittent difficulty swallowing, around once a month where he will feel like he is choking.  Definitely needs water or fluids to take while eating to be able to swallow.  Mom and brother also have intermittent difficulty swallowing.  They have not been worked up for this so cause is not known.  Dulcolax and Mg citrate for episode of constipation helped.  Is still a little constipated.    Weight down 12 lbs since February, feels this happened more recently with decreased appetite.  Denies increased fatigue or weakness.  Stomach swells up at the middle, just below sternum around breakfast but not necessarily with eating.  Water or fluids will make it distend more and increase pain.   Tried Gas-X - is passing gas, belching and passing flatus  Stools - dark brown, harder, no blood or mucus, no dark tarry stools  Tried a " "toxin  for about 3-4 days, quit this after abdominal pain started, was a \"berry cleanser\", symptoms have not improved since stopped this  No history of surgeries  No family history of stomach or colon disease    Preop questions for colonoscopy/endocopy    1. NO - Do you have a history of heart attack, stroke, stent, bypass or surgery on an artery in the head, neck, heart or legs?  2. YES- Do you ever have any pain or discomfort in your chest? Has history of chest pain that is intermittent, syncopal episode in July and went to ER [facility].  No cardiac concerns identified.  Has not had any recent chest pain or syncopal episodes  3. NO - Do you have a history of  Heart Failure?  4. NO - Are you troubled by shortness of breath when: walking on the level, up a slight hill or at night?  5. NO - Do you currently have a cold, bronchitis or other respiratory infection?  6. NO - Do you have a cough, shortness of breath or wheezing?  7. NO - Do you sometimes get pains in the calves of your legs when you walk?  8. NO - Do you or anyone in your family have previous history of blood clots?  9. NO - Do you or does anyone in your family have a serious bleeding problem such as prolonged bleeding following surgeries or cuts?  10. NO - Have you ever had problems with anemia or been told to take iron pills?  11. NO - Have you had any abnormal blood loss such as black, tarry or bloody stools, or abnormal vaginal bleeding?  12. NO - Have you ever had a blood transfusion?  13. NO - Have you or any of your relatives ever had problems with anesthesia?  14. NO - Do you have sleep apnea, excessive snoring or daytime drowsiness?  15. NO - Do you have any prosthetic heart valves?  16. NO - Do you have prosthetic joints?  17. NO - Is there any chance that you may be pregnant?  Problem list and histories reviewed & adjusted, as indicated.  Additional history: as documented    ROS:  Constitutional, HEENT, cardiovascular, pulmonary, gi and " "gu systems are negative, except as otherwise noted.      OBJECTIVE:   /78 (BP Location: Right arm, Patient Position: Chair, Cuff Size: Adult Large)  Pulse 65  Temp 98.1  F (36.7  C) (Oral)  Resp 16  Ht 6' 0.4\" (1.839 m)  Wt 209 lb 9.6 oz (95.1 kg)  SpO2 97%  BMI 28.11 kg/m2  Body mass index is 28.11 kg/(m^2).  GENERAL: healthy, alert and no distress  EYES: Eyes grossly normal to inspection, PERRL and conjunctivae and sclerae normal  HENT: ear canals and TM's normal, nose and mouth without ulcers or lesions  NECK: no adenopathy, no asymmetry, masses, or scars and thyroid normal to palpation  RESP: lungs clear to auscultation - no rales, rhonchi or wheezes  CV: regular rate and rhythm, normal S1 S2, no S3 or S4, no murmur, click or rub, no peripheral edema and peripheral pulses strong  ABDOMEN: mild epigastric pain, soft, negative Greenberg's sign, no hepatosplenomegaly, no masses and bowel sounds normal  MS: no gross musculoskeletal defects noted, no edema  SKIN: no suspicious lesions or rashes  BACK: no CVA tenderness  NEURO: Normal strength and tone, mentation intact and speech normal  PSYCH: mentation appears normal, affect normal/bright    Diagnostic Test Results:  MRI of liver lesion, pending  Endoscopy and colonoscopy, pending  Blood work, pending    Result Narrative - CT Abd/Pelvis 7/21/17 from Care Everywhere   Clinical History: Abdominal Or Pelvic Pain.    Technique: CT of the abdomen and pelvis performed with coronal reconstructions after IV contrast administration. Oral contrast was administered. This CT Scan used dose modulation, iterative reconstruction, and/or weight based dosing when appropriate to reduce radiation dose to as low as reasonably achievable.     Comparison: None.    Findings:  Hepatobiliary: 6 mm focus of enhancement in the dome of the right lobe of the liver, likely a benign vascular lesion. Liver otherwise unremarkable with no further focal lesions. No biliary ductal dilation. " Gallbladder unremarkable. No calcified gallstones.        Pancreas: Homogenous enhancement of the pancreas without focal mass. No pancreatic ductal dilatation. No adjacent inflammatory changes.    Spleen: Normal size spleen without focal abnormality.    Genitourinary/Adrenal Glands: Symmetric enhancement to the kidneys without focal enhancing mass. No hydronephrosis. No bladder wall thickening. Normal adrenal glands.    Gastrointestinal: The distal esophagus, stomach, duodenum, and small bowel are normal in caliber and without focal abnormality. Colon is unremarkable. No evidence of appendicitis.     Additional Abdominal/Pelvic findings: No lymphadenopathy. Normal caliber abdominal aorta. No significant free fluid. Mild stranding right groin in keeping with recent catheterization. No evidence of significant hematoma or pseudoaneurysm.    Lung Bases: The lung bases are clear and without focal infiltrate, consolidation, or pleural fluid.    Musculoskeletal: No significant osseous pathology.    Impression:  1. There is no etiology for the patient's abdominal pain identified.  2. 6 cm focus of enhancement in the right lobe of the liver is likely a benign vascular lesion.  3. Minimal stranding in the right groin in keeping with recent catheterization. No evidence of pseudoaneurysm or hematoma.    [facility]       ASSESSMENT/PLAN:     1. Epigastric pain  5. Dysphagia, unspecified type  6. Weight loss  8. Syncope, unspecified syncope type  9. Chest pain, unspecified type  Patient presents with significant epigastric pain that has been daily for the past 3 weeks.  He had a syncopal episode in July and was seen at [facility].  At the time he was having some abdominal pain and a CT of the abdomen and pelvis was completed which showed a 6 cm focus of enhancement in the liver.  I have ordered an MRI to take a look at this lesion to better define the type of lesion and whether it is concerning or  benign.  I have also ordered colonoscopy and endoscopy as patient has had epigastric pain in addition to a significant change in his bowel habits, going from regular daily stools to constipation and a 12 pound weight loss since earlier this year.  He also reports dysphagia which sounds like his mother and brother experience similar symptoms.  We discussed possible etiologies of gastric ulcer vs cholecystitis vs pancreatitis vs malignancy vs celiac disease vs IBS amongst the top concerns. We will await test results and develop further plans based on results.  - GASTROENTEROLOGY ADULT REF PROCEDURE ONLY  - MR Abdomen w/o & w Contrast; Future  - CBC with platelets and differential  - Comprehensive metabolic panel (BMP + Alb, Alk Phos, ALT, AST, Total. Bili, TP)  - Amylase  - Lipase  - Tissue transglutaminase jake IgA and IgG    2. Liver lesion  - MR Abdomen w/o & w Contrast; Future    3. Constipation, unspecified constipation type  - GASTROENTEROLOGY ADULT REF PROCEDURE ONLY    4. Change in bowel habit  - GASTROENTEROLOGY ADULT REF PROCEDURE ONLY  - Tissue transglutaminase jake IgA and IgG    7. Claustrophobia  - diazepam (VALIUM) 10 MG tablet; Take 30-60 minutes before procedure.  Do not operate a vehicle after taking this medication.  Dispense: 1 tablet; Refill: 0    10. Preop general physical exam  Cleared for anesthesia for colonoscopy/endoscopy if needed. Patient does have history of chest pain and syncopal episode in July that was worked up and cardiac etiology was not identified. He had a positive tilt table test which signifies possible vasovagal syncope.  Thankfully, he has not had any recent episodes of chest pain or syncope.       MAGALIS Ortega [facility]

## 2017-08-16 ENCOUNTER — OFFICE VISIT (OUTPATIENT)
Dept: FAMILY MEDICINE | Facility: OTHER | Age: 32
End: 2017-08-16
Payer: COMMERCIAL

## 2017-08-16 ENCOUNTER — TELEPHONE (OUTPATIENT)
Dept: FAMILY MEDICINE | Facility: OTHER | Age: 32
End: 2017-08-16

## 2017-08-16 VITALS
WEIGHT: 209.6 LBS | HEIGHT: 72 IN | BODY MASS INDEX: 28.39 KG/M2 | TEMPERATURE: 98.1 F | OXYGEN SATURATION: 97 % | DIASTOLIC BLOOD PRESSURE: 78 MMHG | RESPIRATION RATE: 16 BRPM | SYSTOLIC BLOOD PRESSURE: 116 MMHG | HEART RATE: 65 BPM

## 2017-08-16 DIAGNOSIS — R63.4 WEIGHT LOSS: ICD-10-CM

## 2017-08-16 DIAGNOSIS — R19.4 CHANGE IN BOWEL HABIT: ICD-10-CM

## 2017-08-16 DIAGNOSIS — R13.10 DYSPHAGIA, UNSPECIFIED TYPE: ICD-10-CM

## 2017-08-16 DIAGNOSIS — K76.9 LIVER LESION: ICD-10-CM

## 2017-08-16 DIAGNOSIS — F40.240 CLAUSTROPHOBIA: ICD-10-CM

## 2017-08-16 DIAGNOSIS — K59.00 CONSTIPATION, UNSPECIFIED CONSTIPATION TYPE: ICD-10-CM

## 2017-08-16 DIAGNOSIS — Z01.818 PREOP GENERAL PHYSICAL EXAM: ICD-10-CM

## 2017-08-16 DIAGNOSIS — R55 SYNCOPE, UNSPECIFIED SYNCOPE TYPE: ICD-10-CM

## 2017-08-16 DIAGNOSIS — R10.13 EPIGASTRIC PAIN: Primary | ICD-10-CM

## 2017-08-16 DIAGNOSIS — R07.9 CHEST PAIN, UNSPECIFIED TYPE: ICD-10-CM

## 2017-08-16 LAB
ALBUMIN SERPL-MCNC: 3.8 G/DL (ref 3.4–5)
ALP SERPL-CCNC: 65 U/L (ref 40–150)
ALT SERPL W P-5'-P-CCNC: 23 U/L (ref 0–70)
AMYLASE SERPL-CCNC: 46 U/L (ref 30–110)
ANION GAP SERPL CALCULATED.3IONS-SCNC: 6 MMOL/L (ref 3–14)
AST SERPL W P-5'-P-CCNC: 16 U/L (ref 0–45)
BASOPHILS # BLD AUTO: 0.1 10E9/L (ref 0–0.2)
BASOPHILS NFR BLD AUTO: 1.8 %
BILIRUB SERPL-MCNC: 0.6 MG/DL (ref 0.2–1.3)
BUN SERPL-MCNC: 12 MG/DL (ref 7–30)
CALCIUM SERPL-MCNC: 8.7 MG/DL (ref 8.5–10.1)
CHLORIDE SERPL-SCNC: 109 MMOL/L (ref 94–109)
CO2 SERPL-SCNC: 28 MMOL/L (ref 20–32)
CREAT SERPL-MCNC: 1.01 MG/DL (ref 0.66–1.25)
DIFFERENTIAL METHOD BLD: NORMAL
EOSINOPHIL # BLD AUTO: 0.5 10E9/L (ref 0–0.7)
EOSINOPHIL NFR BLD AUTO: 8.5 %
ERYTHROCYTE [DISTWIDTH] IN BLOOD BY AUTOMATED COUNT: 12.8 % (ref 10–15)
GFR SERPL CREATININE-BSD FRML MDRD: 85 ML/MIN/1.7M2
GLUCOSE SERPL-MCNC: 86 MG/DL (ref 70–99)
HCT VFR BLD AUTO: 43.2 % (ref 40–53)
HGB BLD-MCNC: 15 G/DL (ref 13.3–17.7)
LIPASE SERPL-CCNC: 154 U/L (ref 73–393)
LYMPHOCYTES # BLD AUTO: 2.3 10E9/L (ref 0.8–5.3)
LYMPHOCYTES NFR BLD AUTO: 42.1 %
MCH RBC QN AUTO: 30.7 PG (ref 26.5–33)
MCHC RBC AUTO-ENTMCNC: 34.7 G/DL (ref 31.5–36.5)
MCV RBC AUTO: 89 FL (ref 78–100)
MONOCYTES # BLD AUTO: 0.6 10E9/L (ref 0–1.3)
MONOCYTES NFR BLD AUTO: 10.5 %
NEUTROPHILS # BLD AUTO: 2 10E9/L (ref 1.6–8.3)
NEUTROPHILS NFR BLD AUTO: 37.1 %
PLATELET # BLD AUTO: 274 10E9/L (ref 150–450)
POTASSIUM SERPL-SCNC: 4.4 MMOL/L (ref 3.4–5.3)
PROT SERPL-MCNC: 6.6 G/DL (ref 6.8–8.8)
RBC # BLD AUTO: 4.88 10E12/L (ref 4.4–5.9)
SODIUM SERPL-SCNC: 143 MMOL/L (ref 133–144)
WBC # BLD AUTO: 5.4 10E9/L (ref 4–11)

## 2017-08-16 PROCEDURE — 80053 COMPREHEN METABOLIC PANEL: CPT | Performed by: STUDENT IN AN ORGANIZED HEALTH CARE EDUCATION/TRAINING PROGRAM

## 2017-08-16 PROCEDURE — 99214 OFFICE O/P EST MOD 30 MIN: CPT | Performed by: STUDENT IN AN ORGANIZED HEALTH CARE EDUCATION/TRAINING PROGRAM

## 2017-08-16 PROCEDURE — 85025 COMPLETE CBC W/AUTO DIFF WBC: CPT | Performed by: STUDENT IN AN ORGANIZED HEALTH CARE EDUCATION/TRAINING PROGRAM

## 2017-08-16 PROCEDURE — 36415 COLL VENOUS BLD VENIPUNCTURE: CPT | Performed by: STUDENT IN AN ORGANIZED HEALTH CARE EDUCATION/TRAINING PROGRAM

## 2017-08-16 PROCEDURE — 83516 IMMUNOASSAY NONANTIBODY: CPT | Mod: 59 | Performed by: STUDENT IN AN ORGANIZED HEALTH CARE EDUCATION/TRAINING PROGRAM

## 2017-08-16 PROCEDURE — 83516 IMMUNOASSAY NONANTIBODY: CPT | Performed by: STUDENT IN AN ORGANIZED HEALTH CARE EDUCATION/TRAINING PROGRAM

## 2017-08-16 PROCEDURE — 82150 ASSAY OF AMYLASE: CPT | Performed by: STUDENT IN AN ORGANIZED HEALTH CARE EDUCATION/TRAINING PROGRAM

## 2017-08-16 PROCEDURE — 83690 ASSAY OF LIPASE: CPT | Performed by: STUDENT IN AN ORGANIZED HEALTH CARE EDUCATION/TRAINING PROGRAM

## 2017-08-16 RX ORDER — DIAZEPAM 10 MG
TABLET ORAL
Qty: 1 TABLET | Refills: 0 | Status: SHIPPED | OUTPATIENT
Start: 2017-08-16 | End: 2017-09-19

## 2017-08-16 ASSESSMENT — PAIN SCALES - GENERAL: PAINLEVEL: MODERATE PAIN (5)

## 2017-08-16 NOTE — MR AVS SNAPSHOT
After Visit Summary   8/16/2017    Crescencio Moncada    MRN: [id number]           Patient Information     Date Of Birth          1985        Visit Information        Provider Department      8/16/2017 8:00 AM Apryl Correia APRN [facility]        Today's Diagnoses     Need for prophylactic vaccination with tetanus-diphtheria (TD)    -  1    Epigastric pain        Constipation, unspecified constipation type        Change in bowel habit        Liver lesion        Claustrophobia          Care Instructions    MRI - Monday, August 21st at 245 pm.  Bring Valium tablet to appointment. You will take the Valium at the appointment if you decide to take this prior to MRI.  Be sure to have a  if you decide to take Valium.                Follow-ups after your visit        Additional Services     GASTROENTEROLOGY ADULT REF PROCEDURE ONLY       Last Lab Result: Creatinine (mg/dL)       Date                     Value                 12/16/2016               1.01             ----------  Body mass index is 28.11 kg/(m^2).     Needed:  No  Language:  English    Patient will be contacted to schedule procedure.     Please be aware that coverage of these services is subject to the terms and limitations of your health insurance plan.  Call member services at your health plan with any benefit or coverage questions.  Any procedures must be performed at a [facility] facility OR coordinated by your clinic's referral office.    Please bring the following with you to your appointment:    (1) Any X-Rays, CTs or MRIs which have been performed.  Contact the facility where they were done to arrange for  prior to your scheduled appointment.    (2) List of current medications   (3) This referral request   (4) Any documents/labs given to you for this referral                  Your next 10 appointments already scheduled     Aug 21, 2017  2:45 PM CDT   MR ABDOMEN W/O & W CONTRAST with  [facility]   [facility]    [address] [address] [place] [id number] [phone]           Take your medicines as usual, unless your doctor tells you not to. Bring a list of your current medicines to your exam (including vitamins, minerals and over-the-counter drugs). Also bring the results of similar scans you may have had.    The day before your exam, drink extra fluids at least six 8-ounce glasses (unless your doctor tells you to restrict your fluids).   Have a blood test (creatinine test) within 30 days of your exam. Go to your clinic or Diagnostic Imaging Department for this test.   Do not eat or drink for 6 hours prior to exam.  The MRI machine uses a strong magnet. Please wear clothes without metal (snaps, zippers). A sweatsuit works well, or we may give you a hospital gown.  Please remove any body piercings and hair extensions before you arrive. You will also remove watches, jewelry, hairpins, wallets, dentures, partial dental plates and hearing aids. You may wear contact lenses, and you may be able to wear your rings. We have a safe place to keep your personal items, but it is safer to leave them at home.   **IMPORTANT** THE INSTRUCTIONS BELOW ARE ONLY FOR THOSE PATIENTS WHO HAVE BEEN TOLD THEY WILL RECEIVE SEDATION OR GENERAL ANESTHESIA DURING THEIR MRI PROCEDURE:  IF YOU WILL RECEIVE SEDATION (take medicine to help you relax during your exam):   You must get the medicine from your doctor before you arrive. Bring the medicine to the exam. Do not take it at home.   Arrive one hour early. Bring someone who can take you home after the test. Your medicine will make you sleepy. After the exam, you may not drive, take a bus or take a taxi by yourself.   No eating 8 hours before your exam. You may have clear liquids up until 4 hours before your exam. (Clear liquids include water, clear tea, black coffee and fruit juice without pulp.)  IF YOU WILL RECEIVE  "ANESTHESIA (be asleep for your exam):   Arrive 1 1/2 hours early. Bring someone who can take you home after the test. You may not drive, take a bus or take a taxi by yourself.   No eating 8 hours before your exam. You may have clear liquids up until 4 hours before your exam. (Clear liquids include water, clear tea, black coffee and fruit juice without pulp.)  If you have any questions, please contact your Imaging Department exam site.              Future tests that were ordered for you today     Open Future Orders        Priority Expected Expires Ordered    MR Abdomen w/o & w Contrast Routine  8/16/2018 8/16/2017            Who to contact     If you have questions or need follow up information about today's clinic visit or your schedule please contact [facility] directly at [phone].  Normal or non-critical lab and imaging results will be communicated to you by Flowlinehart, letter or phone within 4 business days after the clinic has received the results. If you do not hear from us within 7 days, please contact the clinic through Flowlinehart or phone. If you have a critical or abnormal lab result, we will notify you by phone as soon as possible.  Submit refill requests through TimeGenius or call your pharmacy and they will forward the refill request to us. Please allow 3 business days for your refill to be completed.          Additional Information About Your Visit        TimeGenius Information     TimeGenius lets you send messages to your doctor, view your test results, renew your prescriptions, schedule appointments and more. To sign up, go to [email or web address] . Click on \"Log in\" on the left side of the screen, which will take you to the Welcome page. Then click on \"Sign up Now\" on the right side of the page.     You will be asked to enter the access code listed below, as well as some personal information. Please follow the directions to create your username and password.     Your access code is: " "[email or web address]  Expires: 2017  2:28 PM     Your access code will  in 90 days. If you need help or a new code, please call your [facility] or [phone].        Care EveryWhere ID     This is your Care EveryWhere ID. This could be used by other organizations to access your [facility] medical records  [id number]        Your Vitals Were     Pulse Temperature Respirations Height Pulse Oximetry BMI (Body Mass Index)    65 98.1  F (36.7  C) (Oral) 16 6' 0.4\" (1.839 m) 97% 28.11 kg/m2       Blood Pressure from Last 3 Encounters:   17 116/78   17 122/58   17 146/83    Weight from Last 3 Encounters:   17 209 lb 9.6 oz (95.1 kg)   17 210 lb 3.2 oz (95.3 kg)   17 214 lb 9.6 oz (97.3 kg)              We Performed the Following     Amylase     CBC with platelets and differential     Comprehensive metabolic panel (BMP + Alb, Alk Phos, ALT, AST, Total. Bili, TP)     GASTROENTEROLOGY ADULT REF PROCEDURE ONLY     Lipase     Tissue transglutaminase jake IgA and IgG          Today's Medication Changes          These changes are accurate as of: 17  9:01 AM.  If you have any questions, ask your nurse or doctor.               Start taking these medicines.        Dose/Directions    diazepam 10 MG tablet   Commonly known as:  VALIUM   Used for:  Claustrophobia   Started by:  Apryl Correia APRN CNP        Take 30-60 minutes before procedure.  Do not operate a vehicle after taking this medication.   Quantity:  1 tablet   Refills:  0            Where to get your medicines      Some of these will need a paper prescription and others can be bought over the counter.  Ask your nurse if you have questions.     Bring a paper prescription for each of these medications     diazepam 10 MG tablet                Primary Care Provider Office Phone # Fax #    MAGALIS Cook [phone]       [address] [facility] [address]  [facility] [address]        Equal Access " to Services     ELOISA PECK : Willie Nicholas, watristen marcelino, qapatricia scales. So [facility] [phone].    ATENCIÓN: Si habla español, tiene a mcneal disposición servicios gratuitos de asistencia lingüística. Llame al [phone].    We comply with applicable federal civil rights laws and [place] laws. We do not discriminate on the basis of race, color, national origin, age, disability sex, sexual orientation or gender identity.            Thank you!     Thank you for choosing [facility]  for your care. Our goal is always to provide you with excellent care. Hearing back from our patients is one way we can continue to improve our services. Please take a few minutes to complete the written survey that you may receive in the mail after your visit with us. Thank you!             Your Updated Medication List - Protect others around you: Learn how to safely use, store and throw away your medicines at www.disposemymeds.org.          This list is accurate as of: 8/16/17  9:01 AM.  Always use your most recent med list.                   Brand Name Dispense Instructions for use Diagnosis    diazepam 10 MG tablet    VALIUM    1 tablet    Take 30-60 minutes before procedure.  Do not operate a vehicle after taking this medication.    Claustrophobia

## 2017-08-16 NOTE — TELEPHONE ENCOUNTER
Called patient, he would like to schedule EGD & colonoscopy at [facility] due to insurance reasons. Patient was transferred to  scheduling.

## 2017-08-16 NOTE — NURSING NOTE
"Chief Complaint   Patient presents with     Gastrointestinal Problem     Panel Management     taya, krystal, tdap, tobacco use       Initial /78 (BP Location: Right arm, Patient Position: Chair, Cuff Size: Adult Large)  Pulse 65  Temp 98.1  F (36.7  C) (Oral)  Resp 16  Ht 6' 0.4\" (1.839 m)  Wt 209 lb 9.6 oz (95.1 kg)  SpO2 97%  BMI 28.11 kg/m2 Estimated body mass index is 28.11 kg/(m^2) as calculated from the following:    Height as of this encounter: 6' 0.4\" (1.839 m).    Weight as of this encounter: 209 lb 9.6 oz (95.1 kg).  Medication Reconciliation: complete   Jennifer Huntley CMA      "

## 2017-08-16 NOTE — PATIENT INSTRUCTIONS
MRI - Monday, August 21st at 245 pm.  Bring Valium tablet to appointment. You will take the Valium at the appointment if you decide to take this prior to MRI.  Be sure to have a  if you decide to take Valium.

## 2017-08-17 ENCOUNTER — TELEPHONE (OUTPATIENT)
Dept: FAMILY MEDICINE | Facility: OTHER | Age: 32
End: 2017-08-17

## 2017-08-17 LAB
TTG IGA SER-ACNC: <1 U/ML
TTG IGG SER-ACNC: <1 U/ML

## 2017-08-17 NOTE — TELEPHONE ENCOUNTER
----- Message from MAGALIS Cook CNP sent at 8/16/2017  5:51 PM CDT -----  Please call Johan and let him know his blood work is normal.  I will contact him after we get results on the MRI and colonoscopy/endoscopy.  Thanks,  Apryl Correia, CNP

## 2017-08-21 ENCOUNTER — TELEPHONE (OUTPATIENT)
Dept: FAMILY MEDICINE | Facility: OTHER | Age: 32
End: 2017-08-21

## 2017-08-21 ENCOUNTER — RADIANT APPOINTMENT (OUTPATIENT)
Dept: MRI IMAGING | Facility: CLINIC | Age: 32
End: 2017-08-21
Attending: STUDENT IN AN ORGANIZED HEALTH CARE EDUCATION/TRAINING PROGRAM
Payer: COMMERCIAL

## 2017-08-21 DIAGNOSIS — K76.9 LIVER LESION: ICD-10-CM

## 2017-08-21 DIAGNOSIS — R10.13 EPIGASTRIC PAIN: ICD-10-CM

## 2017-08-21 PROCEDURE — 74183 MRI ABD W/O CNTR FLWD CNTR: CPT | Performed by: RADIOLOGY

## 2017-08-21 PROCEDURE — A9581 GADOXETATE DISODIUM INJ: HCPCS | Performed by: RADIOLOGY

## 2017-08-21 NOTE — TELEPHONE ENCOUNTER
KARYN for pt to call back to clinic. Please notify pt of below  Tyesha Nash   Notes Recorded by Apryl Correia APRN CNP on 8/21/2017 at 2:38 PM  Please call Johan and let him know his screen for Celiac's disease was normal.  STELLA MaddenC

## 2017-08-23 ENCOUNTER — TELEPHONE (OUTPATIENT)
Dept: FAMILY MEDICINE | Facility: OTHER | Age: 32
End: 2017-08-23

## 2017-08-23 NOTE — TELEPHONE ENCOUNTER
----- Message from MAGALIS Cook CNP sent at 8/23/2017 12:18 PM CDT -----  Please call Johan and let him know that the MRI showed that the lesion on his liver appears to be a dilated blood vessel.  This is a benign finding and nothing we need to do further testing or treatment with.      Thanks,  Apryl Correia, CNP

## 2017-09-11 ENCOUNTER — HOSPITAL ENCOUNTER (OUTPATIENT)
Facility: AMBULATORY SURGERY CENTER | Age: 32
Discharge: HOME | End: 2017-09-11
Attending: SURGERY | Admitting: SURGERY
Payer: COMMERCIAL

## 2017-09-11 ENCOUNTER — SURGERY (OUTPATIENT)
Age: 32
End: 2017-09-11
Payer: COMMERCIAL

## 2017-09-11 VITALS
SYSTOLIC BLOOD PRESSURE: 114 MMHG | TEMPERATURE: 96.9 F | DIASTOLIC BLOOD PRESSURE: 78 MMHG | OXYGEN SATURATION: 96 % | RESPIRATION RATE: 16 BRPM

## 2017-09-11 LAB
COLONOSCOPY: NORMAL
UPPER GI ENDOSCOPY: NORMAL

## 2017-09-11 PROCEDURE — 45378 DIAGNOSTIC COLONOSCOPY: CPT

## 2017-09-11 PROCEDURE — G8918 PT W/O PREOP ORDER IV AB PRO: HCPCS

## 2017-09-11 PROCEDURE — 43239 EGD BIOPSY SINGLE/MULTIPLE: CPT

## 2017-09-11 PROCEDURE — G8907 PT DOC NO EVENTS ON DISCHARG: HCPCS

## 2017-09-11 PROCEDURE — 43239 EGD BIOPSY SINGLE/MULTIPLE: CPT | Performed by: SURGERY

## 2017-09-11 PROCEDURE — G0500 MOD SEDAT ENDO SERVICE >5YRS: HCPCS | Performed by: SURGERY

## 2017-09-11 PROCEDURE — 45378 DIAGNOSTIC COLONOSCOPY: CPT | Performed by: SURGERY

## 2017-09-11 PROCEDURE — 88305 TISSUE EXAM BY PATHOLOGIST: CPT | Performed by: SURGERY

## 2017-09-11 RX ORDER — NALOXONE HYDROCHLORIDE 0.4 MG/ML
.1-.4 INJECTION, SOLUTION INTRAMUSCULAR; INTRAVENOUS; SUBCUTANEOUS
Status: CANCELLED | OUTPATIENT
Start: 2017-09-11 | End: 2017-09-12

## 2017-09-11 RX ORDER — FLUMAZENIL 0.1 MG/ML
0.2 INJECTION, SOLUTION INTRAVENOUS
Status: CANCELLED | OUTPATIENT
Start: 2017-09-11 | End: 2017-09-12

## 2017-09-11 RX ORDER — ONDANSETRON 2 MG/ML
4 INJECTION INTRAMUSCULAR; INTRAVENOUS
Status: DISCONTINUED | OUTPATIENT
Start: 2017-09-11 | End: 2017-09-12 | Stop reason: HOSPADM

## 2017-09-11 RX ORDER — ONDANSETRON 2 MG/ML
4 INJECTION INTRAMUSCULAR; INTRAVENOUS EVERY 6 HOURS PRN
Status: CANCELLED | OUTPATIENT
Start: 2017-09-11

## 2017-09-11 RX ORDER — FENTANYL CITRATE 50 UG/ML
INJECTION, SOLUTION INTRAMUSCULAR; INTRAVENOUS PRN
Status: DISCONTINUED | OUTPATIENT
Start: 2017-09-11 | End: 2017-09-11 | Stop reason: HOSPADM

## 2017-09-11 RX ORDER — LIDOCAINE 40 MG/G
CREAM TOPICAL
Status: DISCONTINUED | OUTPATIENT
Start: 2017-09-11 | End: 2017-09-12 | Stop reason: HOSPADM

## 2017-09-11 RX ORDER — ONDANSETRON 4 MG/1
4 TABLET, ORALLY DISINTEGRATING ORAL EVERY 6 HOURS PRN
Status: CANCELLED | OUTPATIENT
Start: 2017-09-11

## 2017-09-11 RX ADMIN — FENTANYL CITRATE 50 MCG: 50 INJECTION, SOLUTION INTRAMUSCULAR; INTRAVENOUS at 11:53

## 2017-09-11 RX ADMIN — FENTANYL CITRATE 25 MCG: 50 INJECTION, SOLUTION INTRAMUSCULAR; INTRAVENOUS at 11:57

## 2017-09-11 RX ADMIN — FENTANYL CITRATE 25 MCG: 50 INJECTION, SOLUTION INTRAMUSCULAR; INTRAVENOUS at 11:55

## 2017-09-11 RX ADMIN — FENTANYL CITRATE 25 MCG: 50 INJECTION, SOLUTION INTRAMUSCULAR; INTRAVENOUS at 12:14

## 2017-09-11 RX ADMIN — FENTANYL CITRATE 25 MCG: 50 INJECTION, SOLUTION INTRAMUSCULAR; INTRAVENOUS at 11:59

## 2017-09-12 NOTE — PROGRESS NOTES
"  SUBJECTIVE:                                                    Crescencio Moncada is a 31 year old male who presents to clinic today for the following health issues:      HPI    Patient is here to follow up on colonoscopy and upper endoscopy. Patient states he has gotten worse since procedure. Pain is waking him up at night, unable to work or do any activity. \"Stomach swells up and pushes into upper cavity and I can't breathe. Feels like getting the wind knocked out of you\". Food or BM doesn't seem to trigger or help. Can feel stomach twisting and turning when laying down. Nicotine, alcohol for sure cause bloating    Pain goes away completely with lying down   Feeling extremely tired and run down  Stressed out by ongoing pain and no known cause.   Now having back pain.  No known cancers in the family   Constipation has resolved and no further changes in stool.    Problem list and histories reviewed & adjusted, as indicated.  Additional history: as documented    Back Pain       Duration: A while        Specific cause: none    Description:   Location of pain: low back both  Character of pain: \"Feels like an arthritis feeling, more in the bones\"  Pain radiation:none  New numbness or weakness in legs, not attributed to pain:  no     Intensity: At its worst 5/10    History:   Pain interferes with job: YES  History of back problems: no prior back problems  Any previous MRI or X-rays: None  Sees a specialist for back pain:  No  Therapies tried without relief: none    Alleviating factors:   Improved by: laying down      Precipitating factors:  Worsened by: Standing    Functional and Psychosocial Screen (Willian STarT Back):      Not performed today          Accompanying Signs & Symptoms:  Risk of Fracture:  None  Risk of Cauda Equina:  None  Risk of Infection:  None  Risk of Cancer:  None  Risk of Ankylosing Spondylitis:  Onset at age <35, male, AND morning back stiffness. no       Labs reviewed in EPIC    ROS:  Constitutional, " "HEENT, cardiovascular, pulmonary, gi and gu systems are negative, except as otherwise noted.      OBJECTIVE:   /64 (BP Location: Left arm, Patient Position: Chair, Cuff Size: Adult Large)  Pulse 79  Temp 97.6  F (36.4  C) (Oral)  Resp 20  Ht 6' 0.4\" (1.839 m)  Wt 208 lb 6.4 oz (94.5 kg)  SpO2 97%  BMI 27.95 kg/m2  Body mass index is 27.95 kg/(m^2).  GENERAL: healthy, alert and no distress  EYES: Eyes grossly normal to inspection, PERRL and conjunctivae and sclerae normal  HENT: ear canals and TM's normal, nose and mouth without ulcers or lesions  NECK: no adenopathy, no asymmetry, masses, or scars and thyroid normal to palpation  RESP: lungs clear to auscultation - no rales, rhonchi or wheezes  CV: regular rate and rhythm, normal S1 S2, no S3 or S4, no murmur, click or rub, no peripheral edema and peripheral pulses strong  ABDOMEN: soft, tender to palpation over epigastric region, no hepatosplenomegaly, no masses and bowel sounds normal   (male): normal male genitalia without lesions or urethral discharge, no hernia  MS: no gross musculoskeletal defects noted, no edema  SKIN: no suspicious lesions or rashes  NEURO: Normal strength and tone, mentation intact and speech normal  Comprehensive back pain exam:  No tenderness, Range of motion not limited by pain, Lower extremity strength functional and equal on both sides and Lower extremity sensation normal and equal on both sides  PSYCH: mentation appears normal, affect normal/bright    Diagnostic Test Results:  Esophogram, pending.    ASSESSMENT/PLAN:     1. Epigastric pain  4. Liver lesion  Ongoing epigastric pain and bloating inferior to sternum where it will get \"rock hard\" and cause intense pain. Colonoscopy and endoscopy were normal. All other blood work and imaging have been essentially normal other than a probable 8 mm hemangioma in the liver which we will watch and re-image in one year to look for growth. Considering symptoms completely resolve " with lying flat, consider sliding hiatal hernia and will get esophogram. I am very concerned about patient as this pain is ongoing and affecting his quality of life. We will continue to try to determine the etiology and will make plan following results of esophogram.  - XR Esophagram w Upper GI; Future    2. Bloated abdomen  - XR Esophagram w Upper GI; Future    3. Acute bilateral low back pain without sciatica  Unclear etiology of pain, no tenderness to palpation, ROM not limited, could be due to increased stress or related to repetitive motion at work. Continue to monitor.    Greater than 50% of 30 minute visit were spent on counseling or coordination of care regarding epigastric pain, liver lesion, bloating, low back pain.     MAGALIS Ortega [facility]

## 2017-09-13 ENCOUNTER — OFFICE VISIT (OUTPATIENT)
Dept: FAMILY MEDICINE | Facility: OTHER | Age: 32
End: 2017-09-13
Payer: COMMERCIAL

## 2017-09-13 VITALS
HEART RATE: 79 BPM | DIASTOLIC BLOOD PRESSURE: 64 MMHG | TEMPERATURE: 97.6 F | RESPIRATION RATE: 20 BRPM | BODY MASS INDEX: 28.23 KG/M2 | WEIGHT: 208.4 LBS | SYSTOLIC BLOOD PRESSURE: 108 MMHG | HEIGHT: 72 IN | OXYGEN SATURATION: 97 %

## 2017-09-13 DIAGNOSIS — R14.0 BLOATED ABDOMEN: ICD-10-CM

## 2017-09-13 DIAGNOSIS — M54.50 ACUTE BILATERAL LOW BACK PAIN WITHOUT SCIATICA: ICD-10-CM

## 2017-09-13 DIAGNOSIS — K76.9 LIVER LESION: ICD-10-CM

## 2017-09-13 DIAGNOSIS — R10.13 EPIGASTRIC PAIN: Primary | ICD-10-CM

## 2017-09-13 LAB — COPATH REPORT: NORMAL

## 2017-09-13 PROCEDURE — 99214 OFFICE O/P EST MOD 30 MIN: CPT | Performed by: STUDENT IN AN ORGANIZED HEALTH CARE EDUCATION/TRAINING PROGRAM

## 2017-09-13 ASSESSMENT — PAIN SCALES - GENERAL: PAINLEVEL: MODERATE PAIN (4)

## 2017-09-13 NOTE — MR AVS SNAPSHOT
After Visit Summary   9/13/2017    Crescencio Moncada    MRN: [id number]           Patient Information     Date Of Birth          1985        Visit Information        Provider Department      9/13/2017 12:00 PM Apryl Correia APRN CNP [facility]        Today's Diagnoses     Epigastric pain    -  1    Need for prophylactic vaccination and inoculation against influenza        Need for prophylactic vaccination with tetanus-diphtheria (TD)        Bloated abdomen          Care Instructions    X-ray esophogram    Apryl Correia NP-C            Follow-ups after your visit        Your next 10 appointments already scheduled     Sep 15, 2017  8:00 AM CDT   XR ESOPHAGRAM W UPPPER GI with PHXR1, PH RAD   [facility] ([facility])    [address] [place] [id number] [phone]           Please bring a list of your current medicines to your exam. (Include vitamins, minerals and over-the-counter medicines.) Leave your valuables at home.  Tell the doctor if there is a chance you could be pregnant.  Do not eat for 4 hours before the exam. Keep drinking clear liquids until 2 hours before the exam.  You may take pain medicine (with a sip of water) up to 4 hours before the exam.  Do not swallow any other medicines unless your doctor tells you to. Talk to your doctor to be sure it s safe to stop your medicines.  Please call the Imaging Department at your exam site with any questions.              Future tests that were ordered for you today     Open Future Orders        Priority Expected Expires Ordered    XR Esophagram w Upper GI Routine 9/13/2017 9/13/2018 9/13/2017            Who to contact     If you have questions or need follow up information about today's clinic visit or your schedule please contact [facility] directly at [phone].  Normal or non-critical lab and imaging results will be communicated to you by Aba  "letter or phone within 4 business days after the clinic has received the results. If you do not hear from us within 7 days, please contact the clinic through linkedFA or phone. If you have a critical or abnormal lab result, we will notify you by phone as soon as possible.  Submit refill requests through linkedFA or call your pharmacy and they will forward the refill request to us. Please allow 3 business days for your refill to be completed.          Additional Information About Your Visit        linkedFA Information     linkedFA lets you send messages to your doctor, view your test results, renew your prescriptions, schedule appointments and more. To sign up, go to [email or web address] . Click on \"Log in\" on the left side of the screen, which will take you to the Welcome page. Then click on \"Sign up Now\" on the right side of the page.     You will be asked to enter the access code listed below, as well as some personal information. Please follow the directions to create your username and password.     Your access code is: [email or web address]  Expires: 2017  2:28 PM     Your access code will  in 90 days. If you need help or a new code, please call your [place] clinic or [phone].        Care EveryWhere ID     This is your Care EveryWhere ID. This could be used by other organizations to access your [place] medical records  [id number]        Your Vitals Were     Pulse Temperature Respirations Height Pulse Oximetry BMI (Body Mass Index)    79 97.6  F (36.4  C) (Oral) 20 6' 0.4\" (1.839 m) 97% 27.95 kg/m2       Blood Pressure from Last 3 Encounters:   17 108/64   17 114/78   17 116/78    Weight from Last 3 Encounters:   17 208 lb 6.4 oz (94.5 kg)   17 209 lb 9.6 oz (95.1 kg)   17 210 lb 3.2 oz (95.3 kg)               Primary Care Provider Office Phone # Fax #    MAGALIS Cook [facility] [phone]       [address]  [facility] [address]   "      Equal Access to Services     [facility] : Hadii aad ku hadjavonmichael Emeliacarey, wajenniferda luqadaha, qaybta octaviosathishpatricia chapman. So [facility] [phone].    ATENCIÓN: Si habla español, tiene a mcneal disposición servicios gratuitos de asistencia lingüística. Llame al [phone].    We comply with applicable federal civil rights laws and [place] laws. We do not discriminate on the basis of race, color, national origin, age, disability sex, sexual orientation or gender identity.            Thank you!     Thank you for choosing [facility]  for your care. Our goal is always to provide you with excellent care. Hearing back from our patients is one way we can continue to improve our services. Please take a few minutes to complete the written survey that you may receive in the mail after your visit with us. Thank you!             Your Updated Medication List - Protect others around you: Learn how to safely use, store and throw away your medicines at www.disposemymeds.org.          This list is accurate as of: 9/13/17  1:20 PM.  Always use your most recent med list.                   Brand Name Dispense Instructions for use Diagnosis    diazepam 10 MG tablet    VALIUM    1 tablet    Take 30-60 minutes before procedure.  Do not operate a vehicle after taking this medication.    Claustrophobia

## 2017-09-13 NOTE — NURSING NOTE
"Chief Complaint   Patient presents with     Follow Up For     Procedure     Panel Management     KATERINA, ruelt, tdap, flu       Initial /64 (BP Location: Left arm, Patient Position: Chair, Cuff Size: Adult Large)  Pulse 79  Temp 97.6  F (36.4  C) (Oral)  Resp 20  Ht 6' 0.4\" (1.839 m)  Wt 208 lb 6.4 oz (94.5 kg)  SpO2 97%  BMI 27.95 kg/m2 Estimated body mass index is 27.95 kg/(m^2) as calculated from the following:    Height as of this encounter: 6' 0.4\" (1.839 m).    Weight as of this encounter: 208 lb 6.4 oz (94.5 kg).  Medication Reconciliation: complete   Jennifer Huntley CMA      "

## 2017-09-18 ENCOUNTER — HOSPITAL ENCOUNTER (OUTPATIENT)
Dept: GENERAL RADIOLOGY | Facility: CLINIC | Age: 32
Discharge: HOME | End: 2017-09-18
Attending: STUDENT IN AN ORGANIZED HEALTH CARE EDUCATION/TRAINING PROGRAM | Admitting: STUDENT IN AN ORGANIZED HEALTH CARE EDUCATION/TRAINING PROGRAM
Payer: COMMERCIAL

## 2017-09-18 DIAGNOSIS — R10.13 EPIGASTRIC PAIN: ICD-10-CM

## 2017-09-18 DIAGNOSIS — R14.0 BLOATED ABDOMEN: ICD-10-CM

## 2017-09-18 PROCEDURE — 74240 X-RAY XM UPR GI TRC 1CNTRST: CPT | Mod: TC

## 2017-09-18 PROCEDURE — 25500045 ZZH RX 255: Performed by: RADIOLOGY

## 2017-09-18 RX ADMIN — ANTACID/ANTIFLATULENT 4 G: 380; 550; 10; 10 GRANULE, EFFERVESCENT ORAL at 08:37

## 2017-09-21 ENCOUNTER — TELEPHONE (OUTPATIENT)
Dept: FAMILY MEDICINE | Facility: OTHER | Age: 32
End: 2017-09-21

## 2017-09-21 DIAGNOSIS — R14.0 ABDOMINAL BLOATING: ICD-10-CM

## 2017-09-21 DIAGNOSIS — R10.13 ABDOMINAL PAIN, EPIGASTRIC: Primary | ICD-10-CM

## 2017-09-21 NOTE — TELEPHONE ENCOUNTER
Please call Johan and let him know there was no hernia detected. I would like him to follow up with gastroenterology for further evaluation.  Please find out where he would like to go. I pended the order in with the location unmarked. Please update order after talking to him and send to me to sign.  Thanks,  Apryl Correia, CNP

## 2017-09-22 NOTE — TELEPHONE ENCOUNTER
Please assist patient with scheduling GI appointment. GI referral has been placed by TK Bae RN, BSN

## 2017-09-22 NOTE — TELEPHONE ENCOUNTER
Reason for call:  Pt wasn't sure where things are at anymore. He does still need to get information on seeing a GI specialist. Please advise.

## 2017-09-22 NOTE — TELEPHONE ENCOUNTER
Please call patient to let him know. I see he is on my schedule today. I am not sure if he was called already with the recommendation to see GI?  I am happy to see him today but I think GI is the best next step.  Thanks,  Apryl Correia, CNP

## 2017-09-25 NOTE — TELEPHONE ENCOUNTER
Pt called to set this up. They cannot get him in [place] until November. Is there somewhere else he can go.  Thank you,  Fabiola Flynn- Pt Rep.

## 2017-09-25 NOTE — TELEPHONE ENCOUNTER
Spoke to patient, he is currently driving and will call back to get phone number for GI to make appointment.

## 2017-09-25 NOTE — TELEPHONE ENCOUNTER
Patient states he is willing to drive even if it is a couple of hours away he just wants to be seen as soon as he can.    Thank you Ximena

## 2017-09-25 NOTE — TELEPHONE ENCOUNTER
Lm for patient to call us back- Need to know how far he is willing to drive. Team Coordinators will have to do some calling around but a lot of our GI's are booked out like that,  Stefan maybe sooner if he would want to drive there.

## 2017-09-26 NOTE — TELEPHONE ENCOUNTER
Spoke to patient and informed him of Gi consult below. I will fax everything down to them and he is going to  a CD to bring to appointment.     Friday Sept 29th at 9:10 [facility]   [address]Clinic) [address] (Endoscopy Center) [place], [place] [address]   [place]   Phone: [phone]

## 2017-12-19 ENCOUNTER — TELEPHONE (OUTPATIENT)
Dept: FAMILY MEDICINE | Facility: OTHER | Age: 32
End: 2017-12-19

## 2017-12-19 DIAGNOSIS — R14.1 FLATULENCE, ERUCTATION, AND GAS PAIN: ICD-10-CM

## 2017-12-19 DIAGNOSIS — R14.2 FLATULENCE, ERUCTATION, AND GAS PAIN: ICD-10-CM

## 2017-12-19 DIAGNOSIS — M53.82 WEAKNESS OF NECK: Primary | ICD-10-CM

## 2017-12-19 DIAGNOSIS — R42 LIGHTHEADEDNESS: ICD-10-CM

## 2017-12-19 DIAGNOSIS — R14.3 FLATULENCE, ERUCTATION, AND GAS PAIN: ICD-10-CM

## 2017-12-19 DIAGNOSIS — R53.83 EXTREME EXHAUSTION: ICD-10-CM

## 2017-12-19 DIAGNOSIS — R10.13 ABDOMINAL PAIN, EPIGASTRIC: ICD-10-CM

## 2017-12-19 NOTE — TELEPHONE ENCOUNTER
Reason for Call: Request for an order or referral:    Order or referral being requested: referral    Date needed: as soon as possible    Has the patient been seen by the PCP for this problem? YES    Additional comments: patient states he has a lot of medical issues going on and would like a referral to the [facility].    Phone number Patient can be reached at:  Home number on file [phone] (home) or Cell number on file:    Telephone Information:   Mobile [phone]       Best Time: anytime    Can we leave a detailed message on this number?  YES    Call taken on 12/19/2017 at 11:59 AM by Xiemna Ha

## 2017-12-20 NOTE — TELEPHONE ENCOUNTER
Paged neurology service. Please find me when they call back.    Spoke to patient and he is having progression of symptoms. His symptoms start with epigastric pain and bloating that is followed by difficulty breathing, weakness, excessive fatigue, lightheadedness and neck weakness. He states he has difficulty holding his head up during these episodes. The episodes typically last an hour and are not related to food or fluid intake. He has not been able to stay awake more than 6 hours per day as he is very sleepy. This has made it difficult to work as he has a physically demanding job. He denies chest pain, changes in appetite, weight loss, constipation, diarrhea, changes in urinating.  Family history of spinal muscular atrophy.   He has had an EGD, colonoscopy, cardiac stress test, MRI of brain and lab work that have all been normal.   I am concerned about a possible neurologic/autonomic dysfunction vs GI etiology.  [facility] and she is going to forward the message to Dr. Nagy.     Apryl Correia, CNP

## 2017-12-21 NOTE — TELEPHONE ENCOUNTER
Spoke with pt and gave him information below. He stated to call him around 4pm or later.    Delores Guajardo CMA ([facility])

## 2017-12-21 NOTE — TELEPHONE ENCOUNTER
Called and l/m for patient. I placed a referral for neurology [phone] and gastroenterology [phone] at the [facility]. He can call to set up these appointments.  I would also like to talk to him as I had one of the neurologists at the [facility] review his chart. Find out a good time to call back.  Thanks,  Apryl Correia, CNP

## 2018-02-05 ENCOUNTER — TRANSFERRED RECORDS (OUTPATIENT)
Dept: HEALTH INFORMATION MANAGEMENT | Facility: CLINIC | Age: 33
End: 2018-02-05

## 2018-02-05 NOTE — TELEPHONE ENCOUNTER
APPT INFO    Date /Time: 2/8/18   Reason for Appt: Muscle Weakness/Exhaustion/Lightheadedness   Ref Provider/Clinic: Zaida Alvarez   Are there internal records? Yes/No?  IF YES, list clinic names: Yes  See Above   Are there outside records? Yes/No? Yes  [facility]-See Careverywhere tab in EPIC   Patient Contact (Y/N) & Call Details: No referred   Action: Reviewed records; requested [facility] push imaging     OUTSIDE RECORDS CHECKLIST     CLINIC NAME COMMENTS REC (x) IMG (x)   CareEverywhere [facility] RADIOLOGY: 7/18/17CT Head/Brain  ER/HOSP: 7/17/17-7/22/17  LABS: 2017 x x

## 2018-02-08 ENCOUNTER — PRE VISIT (OUTPATIENT)
Dept: NEUROLOGY | Facility: CLINIC | Age: 33
End: 2018-02-08

## 2018-02-16 ENCOUNTER — TRANSFERRED RECORDS (OUTPATIENT)
Dept: HEALTH INFORMATION MANAGEMENT | Facility: CLINIC | Age: 33
End: 2018-02-16

## 2018-03-30 ENCOUNTER — TELEPHONE (OUTPATIENT)
Dept: SURGERY | Facility: CLINIC | Age: 33
End: 2018-03-30

## 2018-07-25 ENCOUNTER — OFFICE VISIT (OUTPATIENT)
Dept: URGENT CARE | Facility: RETAIL CLINIC | Age: 33
End: 2018-07-25

## 2018-07-25 VITALS — SYSTOLIC BLOOD PRESSURE: 123 MMHG | TEMPERATURE: 97.6 F | DIASTOLIC BLOOD PRESSURE: 74 MMHG | HEART RATE: 78 BPM

## 2018-07-25 DIAGNOSIS — J01.90 ACUTE SINUSITIS WITH SYMPTOMS > 10 DAYS: Primary | ICD-10-CM

## 2018-07-25 DIAGNOSIS — J02.9 ACUTE PHARYNGITIS, UNSPECIFIED ETIOLOGY: ICD-10-CM

## 2018-07-25 LAB — S PYO AG THROAT QL IA.RAPID: NEGATIVE

## 2018-07-25 PROCEDURE — 87880 STREP A ASSAY W/OPTIC: CPT | Mod: QW | Performed by: PHYSICIAN ASSISTANT

## 2018-07-25 PROCEDURE — 99213 OFFICE O/P EST LOW 20 MIN: CPT | Performed by: PHYSICIAN ASSISTANT

## 2018-07-25 ASSESSMENT — ENCOUNTER SYMPTOMS
WHEEZING: 0
MYALGIAS: 0
CONSTIPATION: 0
PALPITATIONS: 0
COUGH: 0
FATIGUE: 0
DIARRHEA: 0
NAUSEA: 0
FEVER: 0
ARTHRALGIAS: 0
EYE PAIN: 0
EYE REDNESS: 0
UNEXPECTED WEIGHT CHANGE: 0
VOMITING: 0
RHINORRHEA: 0
SINUS PRESSURE: 1
ABDOMINAL PAIN: 0
SINUS PAIN: 1
SORE THROAT: 1
CHILLS: 0
SHORTNESS OF BREATH: 0
EYE ITCHING: 0
EYE DISCHARGE: 0

## 2018-07-25 NOTE — MR AVS SNAPSHOT
"              After Visit Summary   2018    Crescencio Moncada    MRN: [id number]           Patient Information     Date Of Birth          1985        Visit Information        Provider Department      2018 3:10 PM Ofelia Mcclure PA-C [facility] [place]        Today's Diagnoses     Acute sinusitis with symptoms > 10 days    -  1    Acute pharyngitis, unspecified etiology           Follow-ups after your visit        Follow-up notes from your care team     Return if symptoms worsen or fail to improve.      Who to contact     You can reach your care team any time of the day by calling [phone].  Notification of test results:  If you have an abnormal lab result, we will notify you by phone as soon as possible.         Additional Information About Your Visit        MyChart Information     Distillhart lets you send messages to your doctor, view your test results, renew your prescriptions, schedule appointments and more. To sign up, go to [email or web address] . Click on \"Log in\" on the left side of the screen, which will take you to the Welcome page. Then click on \"Sign up Now\" on the right side of the page.     You will be asked to enter the access code listed below, as well as some personal information. Please follow the directions to create your username and password.     Your access code is: [email or web address]  Expires: 10/23/2018  3:57 PM     Your access code will  in 90 days. If you need help or a new code, please call your [place] clinic or [phone].        Care EveryWhere ID     This is your Care EveryWhere ID. This could be used by other organizations to access your [place] medical records  [id number]        Your Vitals Were     Pulse Temperature                78 97.6  F (36.4  C) (Tympanic)           Blood Pressure from Last 3 Encounters:   18 123/74   17 108/64   17 114/78    Weight from Last 3 Encounters:   17 208 lb 6.4 oz (94.5 kg)   17 209 " lb 9.6 oz (95.1 kg)   07/24/17 210 lb 3.2 oz (95.3 kg)              We Performed the Following     RAPID STREP SCREEN          Today's Medication Changes          These changes are accurate as of 7/25/18  3:57 PM.  If you have any questions, ask your nurse or doctor.               Start taking these medicines.        Dose/Directions    amoxicillin-clavulanate 875-125 MG per tablet   Commonly known as:  AUGMENTIN   Used for:  Acute sinusitis with symptoms > 10 days        Dose:  1 tablet   Take 1 tablet by mouth 2 times daily for 7 days   Quantity:  14 tablet   Refills:  0            Where to get your medicines      These medications were sent to [id number] - [place], [place] - [address]  [address] [address] [address]     Phone:  [phone]     amoxicillin-clavulanate 875-125 MG per tablet                Primary Care Provider Office Phone # Fax #    Apryl Cassiaelena Correia, APRN [facility] [phone] [address] [address] [place] [address]        Equal Access to Services     [facility]: Hadii aad ku hadasho Soomaali, waaxda luqadaha, qaybta kaalmada adeegyada, waxay idiin hayhao zeng . So [facility] [phone].    ATENCIÓN: Si habla español, tiene a mcneal disposición servicios gratuitos de asistencia lingüística. [facility] [phone].    We comply with applicable federal civil rights laws and [place] laws. We do not discriminate on the basis of race, color, national origin, age, disability, sex, sexual orientation, or gender identity.            Thank you!     Thank you for choosing [facility]  for your care. Our goal is always to provide you with excellent care. Hearing back from our patients is one way we can continue to improve our services. Please take a few minutes to complete the written survey that you may receive in the mail after your visit with us. Thank you!             Your Updated Medication List - Protect others around you:  Learn how to safely use, store and throw away your medicines at www.disposemymeds.org.          This list is accurate as of 7/25/18  3:57 PM.  Always use your most recent med list.                   Brand Name Dispense Instructions for use Diagnosis    amoxicillin-clavulanate 875-125 MG per tablet    AUGMENTIN    14 tablet    Take 1 tablet by mouth 2 times daily for 7 days    Acute sinusitis with symptoms > 10 days       TYLENOL PO

## 2018-07-25 NOTE — PROGRESS NOTES
SUBJECTIVE:   Crescencio Moncada is a 32 year old male presenting with a chief complaint of   Chief Complaint   Patient presents with     Pharyngitis     x 5 days, pain worse the last couple of days, no fever, bilateral ear pain x 5 days, sinus congestion x 1+ week       He is an established patient of [facility].    URI Adult    Onset of symptoms was 1+ week for sinuses and 5 days for sore throat   Course of illness is same.    Severity moderate  Current and Associated symptoms: ear pain bilateral, sore throat, teeth hurt, and facial pain/pressure  Treatment measures tried include Tylenol/Ibuprofen.  Predisposing factors include None.      Review of Systems   Constitutional: Negative for chills, fatigue, fever and unexpected weight change.   HENT: Positive for ear pain, sinus pain, sinus pressure and sore throat. Negative for congestion and rhinorrhea.    Eyes: Negative for pain, discharge, redness and itching.   Respiratory: Negative for cough, shortness of breath and wheezing.    Cardiovascular: Negative for chest pain, palpitations and leg swelling.   Gastrointestinal: Negative for abdominal pain, constipation, diarrhea, nausea and vomiting.   Musculoskeletal: Negative for arthralgias and myalgias.   Skin: Negative for rash.       No past medical history on file.  Family History   Problem Relation Age of Onset     Myocardial Infarction Paternal Grandfather 46     Genetic Disorder Mother      spinal muscular atrophy gene     Genetic Disorder Sister 0     spinal muscular atrophy     Coronary Artery Disease Paternal Half-Brother      Current Outpatient Prescriptions   Medication Sig Dispense Refill     Acetaminophen (TYLENOL PO)        amoxicillin-clavulanate (AUGMENTIN) 875-125 MG per tablet Take 1 tablet by mouth 2 times daily for 7 days 14 tablet 0     Social History   Substance Use Topics     Smoking status: Current Every Day Smoker     Types: Dip, chew, snus or snuff     Smokeless tobacco: Former User     Types:  Chew     Alcohol use Yes      Comment: 4-5 beers/day, sometimes more on the weekend       OBJECTIVE  /74 (BP Location: Left arm)  Pulse 78  Temp 97.6  F (36.4  C) (Tympanic)    Physical Exam   Constitutional: He appears well-developed and well-nourished. No distress.   HENT:   Head: Normocephalic and atraumatic.   Right Ear: Tympanic membrane and ear canal normal.   Left Ear: Tympanic membrane and ear canal normal.   Nose: Mucosal edema and rhinorrhea present.   Mouth/Throat: Posterior oropharyngeal erythema present. No oropharyngeal exudate.   Eyes: Conjunctivae are normal. Pupils are equal, round, and reactive to light.   Cardiovascular: Normal rate and regular rhythm.    Pulmonary/Chest: Effort normal and breath sounds normal.   Skin: Skin is warm and dry. No rash noted.   Psychiatric: He has a normal mood and affect. His behavior is normal.       Labs:  Results for orders placed or performed in visit on 07/25/18 (from the past 24 hour(s))   RAPID STREP SCREEN   Result Value Ref Range    Rapid Strep A Screen negative neg       X-Ray was not done.    ASSESSMENT:      ICD-10-CM    1. Acute sinusitis with symptoms > 10 days J01.90 amoxicillin-clavulanate (AUGMENTIN) 875-125 MG per tablet   2. Acute pharyngitis, unspecified etiology J02.9 RAPID STREP SCREEN     CANCELED: BETA STREP GROUP A R/O CULTURE        Medical Decision Making:    Differential Diagnosis:  URI Adult/Peds:  Sinusitis, Strep pharyngitis, Tonsilitis, Viral pharyngitis, Viral syndrome and Viral upper respiratory illness    Serious Comorbid Conditions:  Adult:  None    PLAN:    URI Adult:  Tylenol, Ibuprofen, Fluids, Rest, OTC cough suppressant/expectorant, OTC decongestant/antihistamine and RX sinusitis  Augmentin x 7 days     Followup:    If not improving or if condition worsens, follow up with your Primary Care Provider    There are no Patient Instructions on file for this visit.

## 2018-08-02 ENCOUNTER — RADIANT APPOINTMENT (OUTPATIENT)
Dept: GENERAL RADIOLOGY | Facility: OTHER | Age: 33
End: 2018-08-02
Attending: PHYSICAL MEDICINE & REHABILITATION
Payer: COMMERCIAL

## 2018-08-02 ENCOUNTER — OFFICE VISIT (OUTPATIENT)
Dept: ORTHOPEDICS | Facility: OTHER | Age: 33
End: 2018-08-02
Payer: COMMERCIAL

## 2018-08-02 VITALS
SYSTOLIC BLOOD PRESSURE: 112 MMHG | WEIGHT: 216 LBS | HEIGHT: 72 IN | BODY MASS INDEX: 29.26 KG/M2 | DIASTOLIC BLOOD PRESSURE: 70 MMHG

## 2018-08-02 DIAGNOSIS — M25.531 WRIST PAIN, ACUTE, RIGHT: Primary | ICD-10-CM

## 2018-08-02 DIAGNOSIS — M25.531 WRIST PAIN, ACUTE, RIGHT: ICD-10-CM

## 2018-08-02 DIAGNOSIS — S66.911A STRAIN OF RIGHT WRIST, INITIAL ENCOUNTER: ICD-10-CM

## 2018-08-02 PROCEDURE — 73110 X-RAY EXAM OF WRIST: CPT | Mod: RT

## 2018-08-02 PROCEDURE — 99213 OFFICE O/P EST LOW 20 MIN: CPT | Performed by: PHYSICAL MEDICINE & REHABILITATION

## 2018-08-02 NOTE — PATIENT INSTRUCTIONS
Today's Plan of Care:  -Soft wrist brace as needed for comfort and support  -Ice or heat 15-20 minutes as needed (Avoid sleeping on a heating pad or ice)  -Provided home exercises. Please do 5-6 days of exercises per week.  -Ibuprofen or Tylenol as directed on packaging as needed for pain or soreness.  Please take ibuprofen with food.     Follow up as needed if symptoms fail to improve or worsen. Please call with any questions or concerns.

## 2018-08-02 NOTE — MR AVS SNAPSHOT
"              After Visit Summary   8/2/2018    Crescencio Moncada    MRN: [id number]           Patient Information     Date Of Birth          1985        Visit Information        Provider Department      8/2/2018 8:20 AM Apryl Dunn MD [facility]        Today's Diagnoses     Wrist pain, acute, right    -  1      Care Instructions    Today's Plan of Care:  -Brace as needed for comfort and support  -Ice or heat 15-20 minutes as needed (Avoid sleeping on a heating pad or ice)  -Provided home exercises. Please do 5-6 days of exercises per week.  -Ibuprofen or Tylenol as directed on packaging as needed for pain or soreness.  Please take ibuprofen with food.     Follow Up: As needed if symptoms fail to improve or worsen. Please call with any questions or concerns.               Follow-ups after your visit        Who to contact     If you have questions or need follow up information about today's clinic visit or your schedule please contact [facility] directly at [phone].  Normal or non-critical lab and imaging results will be communicated to you by [employer] System[employer]art, letter or phone within 4 business days after the clinic has received the results. If you do not hear from us within 7 days, please contact the clinic through [employer] or phone. If you have a critical or abnormal lab result, we will notify you by phone as soon as possible.  Submit refill requests through BandPage or call your pharmacy and they will forward the refill request to us. Please allow 3 business days for your refill to be completed.          Additional Information About Your Visit        [employer]wl Mobile Information     BandPage lets you send messages to your doctor, view your test results, renew your prescriptions, schedule appointments and more. To sign up, go to [email or web address] . Click on \"Log in\" on the left side of the screen, which will take you to the Welcome page. Then click on \"Sign up Now\" on the " "right side of the page.     You will be asked to enter the access code listed below, as well as some personal information. Please follow the directions to create your username and password.     Your access code is: [email or web address]  Expires: 10/23/2018  3:57 PM     Your access code will  in 90 days. If you need help or a new code, please call your [place] clinic or [phone].        Care EveryWhere ID     This is your Care EveryWhere ID. This could be used by other organizations to access your [place] medical records  [id number]        Your Vitals Were     Height BMI (Body Mass Index)                6' 0.28\" (1.836 m) 29.07 kg/m2           Blood Pressure from Last 3 Encounters:   18 112/70   18 123/74   17 108/64    Weight from Last 3 Encounters:   18 216 lb (98 kg)   17 208 lb 6.4 oz (94.5 kg)   17 209 lb 9.6 oz (95.1 kg)               Primary Care Provider Office Phone # Fax #    Apryl Cassia Correia, MAGALIS [facility] [phone] [address] [address] [place] [address]        Equal Access to Services     ELOISA PECK AH: Hadii dionte ku hadasho Soomaali, waaxda luqadaha, qaybta kaalmada adeegyada, waxay idiin hayronnelln tosha zeng . So [facility] [phone].    ATENCIÓN: Si habla español, tiene a mcneal disposición servicios gratuitos de asistencia lingüística. Llame al [phone].    We comply with applicable federal civil rights laws and [place] laws. We do not discriminate on the basis of race, color, national origin, age, disability, sex, sexual orientation, or gender identity.            Thank you!     Thank you for choosing [facility]  for your care. Our goal is always to provide you with excellent care. Hearing back from our patients is one way we can continue to improve our services. Please take a few minutes to complete the written survey that you may receive in the mail after your visit with us. Thank you!             Your Updated " Medication List - Protect others around you: Learn how to safely use, store and throw away your medicines at www.disposemymeds.org.          This list is accurate as of 8/2/18  8:46 AM.  Always use your most recent med list.                   Brand Name Dispense Instructions for use Diagnosis    TYLENOL PO

## 2018-08-02 NOTE — PROGRESS NOTES
Sports Medicine Clinic Visit    PCP: Apryl Correia    CC: Patient presents with:  Right Hand - Pain      HPI:  Crescencio Moncada is a 32 year old male who is seen as a self referral.   He notes right hand pain that began 12 days ago with insidious onset. He did throw a baseball ~ 15 times, very hard at the fair, and had immediate pain in the elbow and hand. He initially had pain in the whole arm with numbness and tingling. The arm pain has subsided and he continues to have pain in the wrist and pain started to travel through the musculature of the forearm yesterday. He notes pain over the ulnar side of the wrist.  He rates the pain at a  7/10 at its worst and a 4/10 currently.  Symptoms are relieved with ice. Symptoms are worsened by twisting, ulnar deviation. He endorses swelling and clicking.   He denies bruising, popping, grinding, catching, locking, instability, numbness, tingling and weakness.  Other treatment has included Tylenol. He notes a history of a boxer's fracture on the right hand but completely recovered from that without any further issues.  .     Review of Systems:  Musculoskeletal: as above  Remainder of review of systems is negative including constitutional, eyes, ENT, CV, pulmonary, GI, , endocrine, skin, hematologic, and neurologic except as noted in HPI or medical history.    History reviewed. No pertinent past surgical/medical/family/social history other than as mentioned in HPI.    Patient Active Problem List   Diagnosis     Injury of right hand, initial encounter     Pain of right hand     Need for diphtheria-tetanus-pertussis (Tdap) vaccine, adult/adolescent     Epigastric pain     Liver lesion     Syncope, unspecified syncope type     History reviewed. No pertinent past medical history.  Past Surgical History:   Procedure Laterality Date     COLONOSCOPY WITH CO2 INSUFFLATION N/A 9/11/2017    Procedure: COLONOSCOPY WITH CO2 INSUFFLATION;  EGD, Colonoscopy, Dr Correia referring,  "BMI 28.11, [facility] fax; [phone];  Surgeon: Amber Lowry MD;  Location: [facility] OR     COMBINED ESOPHAGOSCOPY, GASTROSCOPY, DUODENOSCOPY (EGD) WITH CO2 INSUFFLATION N/A 9/11/2017    Procedure: COMBINED ESOPHAGOSCOPY, GASTROSCOPY, DUODENOSCOPY (EGD) WITH CO2 INSUFFLATION;  EGD, Colonoscopy, Dr Correia referring, BMI 28.11, [facility] fax; [phone];  Surgeon: Amber Lowry MD;  Location: [facility] OR     ESOPHAGOSCOPY, GASTROSCOPY, DUODENOSCOPY (EGD), COMBINED N/A 9/11/2017    Procedure: COMBINED ESOPHAGOSCOPY, GASTROSCOPY, DUODENOSCOPY (EGD), BIOPSY SINGLE OR MULTIPLE;;  Surgeon: Amber Lowry MD;  Location: [facility] OR     Family History   Problem Relation Age of Onset     Myocardial Infarction Paternal Grandfather 46     Genetic Disorder Mother      spinal muscular atrophy gene     Genetic Disorder Sister 0     spinal muscular atrophy     Coronary Artery Disease Paternal Half-Brother      Social History     Social History     Marital status:      Spouse name: N/A     Number of children: N/A     Years of education: N/A     Occupational History     Not on file.     Social History Main Topics     Smoking status: Current Every Day Smoker     Types: Dip, chew, snus or snuff     Smokeless tobacco: Former User     Types: Chew      Comment: every 3 days     Alcohol use Yes      Comment: 4-5 beers/day, sometimes more on the weekend     Drug use: No     Sexual activity: Yes     Partners: Female     Other Topics Concern     Not on file     Social History Narrative       He works installing tile and [employer]    Current Outpatient Prescriptions   Medication     Acetaminophen (TYLENOL PO)     No current facility-administered medications for this visit.      No Known Allergies      Objective:  /70  Ht 6' 0.28\" (1.836 m)  Wt 216 lb (98 kg)  BMI 29.07 kg/m2    General: Alert and in no distress    Head: Normocephalic, atraumatic  Eyes: no scleral icterus or conjunctival erythema   Oropharynx: "  Mucous membranes moist  Skin: no erythema, petechiae, or jaundice  CV: regular rhythm by palpation, 2+ distal pulses  Resp: normal respiratory effort without conversational dyspnea   Psych: normal mood and affect    Neuro: Motor strength and sensation as noted below    Musculoskeletal:    Bilateral Wrist and Hand exam    Inspection:       No swelling, bruising or deformity bilaterally    Tenderness:  None    ROM:  -Mildly decreased right wrist extension    Strength:  Forearm supination 5/5 bilaterally  Forearm pronation 5-/5 right, 5/5 left (limited by pain with resisted pronation on the right)  Wrist extension 5/5 bilaterally  Wrist flexion 5/5 bilaterally   strength 5/5 bilaterally  Finger abduction 5/5 bilaterally    Neurovascular:       2+ radial pulses bilaterally and normal sensation to light touch in the radial, median and ulnar nerve distributions      Radiology:  X-rays ordered and independent visualization of images performed.  No acute osseous abnormality seen.   Full radiology report to follow.      Assessment:  1. Wrist pain, acute, right    2. Strain of right wrist, initial encounter        Plan:  Discussed the assessment with the patient and developed a plan together:  -Suspect musculotendinous strain injury.  Conservative care as below:  -Soft wrist brace as needed for comfort and support  -Ice or heat 15-20 minutes as needed (Avoid sleeping on a heating pad or ice)  -Provided home exercises. Please do 5-6 days of exercises per week.  -Ibuprofen or Tylenol as directed on packaging as needed for pain or soreness.  Please take ibuprofen with food.     Follow up as needed if symptoms fail to improve or worsen. Please call with any questions or concerns.     Jessie Dunn MD, CAQ Sports Medicine  [facility]

## 2018-08-02 NOTE — LETTER
8/2/2018         RE: Crescencio Moncada  [address] [address] [address]        Dear Colleague,    Thank you for referring your patient, Crescencio Moncada, to the [facility]. Please see a copy of my visit note below.    Sports Medicine Clinic Visit    PCP: Apryl Correia    CC: Patient presents with:  Right Hand - Pain      HPI:  Crescencio Moncada is a 32 year old male who is seen as a self referral.   He notes right hand pain that began 12 days ago with insidious onset. He did throw a baseball ~ 15 times, very hard at the fair, and had immediate pain in the elbow and hand. He initially had pain in the whole arm with numbness and tingling. The arm pain has subsided and he continues to have pain in the wrist and pain started to travel through the musculature of the forearm yesterday. He notes pain over the ulnar side of the wrist.  He rates the pain at a  7/10 at its worst and a 4/10 currently.  Symptoms are relieved with ice. Symptoms are worsened by twisting, ulnar deviation. He endorses swelling and clicking.   He denies bruising, popping, grinding, catching, locking, instability, numbness, tingling and weakness.  Other treatment has included Tylenol. He notes a history of a boxer's fracture on the right hand but completely recovered from that without any further issues.  .     Review of Systems:  Musculoskeletal: as above  Remainder of review of systems is negative including constitutional, eyes, ENT, CV, pulmonary, GI, , endocrine, skin, hematologic, and neurologic except as noted in HPI or medical history.    History reviewed. No pertinent past surgical/medical/family/social history other than as mentioned in HPI.    Patient Active Problem List   Diagnosis     Injury of right hand, initial encounter     Pain of right hand     Need for diphtheria-tetanus-pertussis (Tdap) vaccine, adult/adolescent     Epigastric pain     Liver lesion     Syncope, unspecified syncope type     History  reviewed. No pertinent past medical history.  Past Surgical History:   Procedure Laterality Date     COLONOSCOPY WITH CO2 INSUFFLATION N/A 9/11/2017    Procedure: COLONOSCOPY WITH CO2 INSUFFLATION;  EGD, Colonoscopy, Dr Correia referring, BMI 28.11, [facility] fax; [phone];  Surgeon: Amber Lowry MD;  Location: [facility] OR     COMBINED ESOPHAGOSCOPY, GASTROSCOPY, DUODENOSCOPY (EGD) WITH CO2 INSUFFLATION N/A 9/11/2017    Procedure: COMBINED ESOPHAGOSCOPY, GASTROSCOPY, DUODENOSCOPY (EGD) WITH CO2 INSUFFLATION;  EGD, Colonoscopy, Dr Correia referring, BMI 28.11, [facility] fax; [phone];  Surgeon: Amber Lowry MD;  Location: [facility] OR     ESOPHAGOSCOPY, GASTROSCOPY, DUODENOSCOPY (EGD), COMBINED N/A 9/11/2017    Procedure: COMBINED ESOPHAGOSCOPY, GASTROSCOPY, DUODENOSCOPY (EGD), BIOPSY SINGLE OR MULTIPLE;;  Surgeon: Amber Lowry MD;  Location: [facility] OR     Family History   Problem Relation Age of Onset     Myocardial Infarction Paternal Grandfather 46     Genetic Disorder Mother      spinal muscular atrophy gene     Genetic Disorder Sister 0     spinal muscular atrophy     Coronary Artery Disease Paternal Half-Brother      Social History     Social History     Marital status:      Spouse name: N/A     Number of children: N/A     Years of education: N/A     Occupational History     Not on file.     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      No  "Known Allergies      Objective:  /70  Ht 6' 0.28\" (1.836 m)  Wt 216 lb (98 kg)  BMI 29.07 kg/m2    General: Alert and in no distress    Head: Normocephalic, atraumatic  Eyes: no scleral icterus or conjunctival erythema   Oropharynx:  Mucous membranes moist  Skin: no erythema, petechiae, or jaundice  CV: regular rhythm by palpation, 2+ distal pulses  Resp: normal respiratory effort without conversational dyspnea   Psych: normal mood and affect    Neuro: Motor strength and sensation as noted below    Musculoskeletal:    Bilateral Wrist and Hand exam    Inspection:       No swelling, bruising or deformity bilaterally    Tenderness:  None    ROM:  -Mildly decreased right wrist extension    Strength:  Forearm supination 5/5 bilaterally  Forearm pronation 5-/5 right, 5/5 left (limited by pain with resisted pronation on the right)  Wrist extension 5/5 bilaterally  Wrist flexion 5/5 bilaterally   strength 5/5 bilaterally  Finger abduction 5/5 bilaterally    Neurovascular:       2+ radial pulses bilaterally and normal sensation to light touch in the radial, median and ulnar nerve distributions      Radiology:  X-rays ordered and independent visualization of images performed.  No acute osseous abnormality seen.   Full radiology report to follow.      Assessment:  1. Wrist pain, acute, right    2. Strain of right wrist, initial encounter        Plan:  Discussed the assessment with the patient and developed a plan together:  -Suspect musculotendinous strain injury.  Conservative care as below:  -Soft wrist brace as needed for comfort and support  -Ice or heat 15-20 minutes as needed (Avoid sleeping on a heating pad or ice)  -Provided home exercises. Please do 5-6 days of exercises per week.  -Ibuprofen or Tylenol as directed on packaging as needed for pain or soreness.  Please take ibuprofen with food.     Follow up as needed if symptoms fail to improve or worsen. Please call with any questions or concerns. "     Jessie Dunn MD, [facility] Sports Medicine  [facility]      Again, thank you for allowing me to participate in the care of your patient.        Sincerely,        Apryl Dunn MD

## 2018-11-24 ENCOUNTER — APPOINTMENT (OUTPATIENT)
Dept: CT IMAGING | Facility: CLINIC | Age: 33
End: 2018-11-24
Attending: FAMILY MEDICINE
Payer: COMMERCIAL

## 2018-11-24 ENCOUNTER — HOSPITAL ENCOUNTER (EMERGENCY)
Facility: CLINIC | Age: 33
Discharge: HOME | End: 2018-11-24
Attending: FAMILY MEDICINE | Admitting: FAMILY MEDICINE
Payer: COMMERCIAL

## 2018-11-24 VITALS
BODY MASS INDEX: 30.76 KG/M2 | DIASTOLIC BLOOD PRESSURE: 92 MMHG | OXYGEN SATURATION: 98 % | WEIGHT: 228.6 LBS | RESPIRATION RATE: 18 BRPM | SYSTOLIC BLOOD PRESSURE: 159 MMHG | TEMPERATURE: 98.3 F

## 2018-11-24 DIAGNOSIS — R29.898 BILATERAL ARM WEAKNESS: ICD-10-CM

## 2018-11-24 DIAGNOSIS — L40.9 PSORIASIS: ICD-10-CM

## 2018-11-24 DIAGNOSIS — R13.10 DYSPHAGIA, UNSPECIFIED TYPE: ICD-10-CM

## 2018-11-24 DIAGNOSIS — E03.9 HYPOTHYROIDISM, UNSPECIFIED TYPE: ICD-10-CM

## 2018-11-24 LAB
ALBUMIN SERPL-MCNC: 3.8 G/DL (ref 3.4–5)
ALP SERPL-CCNC: 65 U/L (ref 40–150)
ALT SERPL W P-5'-P-CCNC: 64 U/L (ref 0–70)
ANION GAP SERPL CALCULATED.3IONS-SCNC: 10 MMOL/L (ref 3–14)
AST SERPL W P-5'-P-CCNC: 45 U/L (ref 0–45)
BASOPHILS # BLD AUTO: 0.1 10E9/L (ref 0–0.2)
BASOPHILS NFR BLD AUTO: 1.1 %
BILIRUB SERPL-MCNC: 0.4 MG/DL (ref 0.2–1.3)
BUN SERPL-MCNC: 13 MG/DL (ref 7–30)
CALCIUM SERPL-MCNC: 8.9 MG/DL (ref 8.5–10.1)
CHLORIDE SERPL-SCNC: 106 MMOL/L (ref 94–109)
CK SERPL-CCNC: 85 U/L (ref 30–300)
CO2 SERPL-SCNC: 26 MMOL/L (ref 20–32)
CREAT SERPL-MCNC: 0.9 MG/DL (ref 0.66–1.25)
CRP SERPL-MCNC: <2.9 MG/L (ref 0–8)
DIFFERENTIAL METHOD BLD: NORMAL
EOSINOPHIL NFR BLD AUTO: 3.9 %
ERYTHROCYTE [DISTWIDTH] IN BLOOD BY AUTOMATED COUNT: 12.5 % (ref 10–15)
ERYTHROCYTE [SEDIMENTATION RATE] IN BLOOD BY WESTERGREN METHOD: 3 MM/H (ref 0–15)
GFR SERPL CREATININE-BSD FRML MDRD: >90 ML/MIN/1.7M2
GLUCOSE SERPL-MCNC: 89 MG/DL (ref 70–99)
HCT VFR BLD AUTO: 41.8 % (ref 40–53)
HGB BLD-MCNC: 14.4 G/DL (ref 13.3–17.7)
IMM GRANULOCYTES # BLD: 0 10E9/L (ref 0–0.4)
IMM GRANULOCYTES NFR BLD: 0.5 %
LYMPHOCYTES # BLD AUTO: 3.3 10E9/L (ref 0.8–5.3)
LYMPHOCYTES NFR BLD AUTO: 40.9 %
MCH RBC QN AUTO: 30.7 PG (ref 26.5–33)
MCHC RBC AUTO-ENTMCNC: 34.4 G/DL (ref 31.5–36.5)
MCV RBC AUTO: 89 FL (ref 78–100)
MONOCYTES # BLD AUTO: 0.7 10E9/L (ref 0–1.3)
MONOCYTES NFR BLD AUTO: 8.9 %
NEUTROPHILS # BLD AUTO: 3.6 10E9/L (ref 1.6–8.3)
NEUTROPHILS NFR BLD AUTO: 44.7 %
NRBC # BLD AUTO: 0 10*3/UL
NRBC BLD AUTO-RTO: 0 /100
PLATELET # BLD AUTO: 237 10E9/L (ref 150–450)
POTASSIUM SERPL-SCNC: 4.1 MMOL/L (ref 3.4–5.3)
PROT SERPL-MCNC: 6.4 G/DL (ref 6.8–8.8)
RBC # BLD AUTO: 4.69 10E12/L (ref 4.4–5.9)
SODIUM SERPL-SCNC: 142 MMOL/L (ref 133–144)
TSH SERPL DL<=0.005 MIU/L-ACNC: 5.36 MU/L (ref 0.4–4)
WBC # BLD AUTO: 8 10E9/L (ref 4–11)

## 2018-11-24 PROCEDURE — 85652 RBC SED RATE AUTOMATED: CPT | Performed by: FAMILY MEDICINE

## 2018-11-24 PROCEDURE — 82550 ASSAY OF CK (CPK): CPT | Performed by: FAMILY MEDICINE

## 2018-11-24 PROCEDURE — 99284 EMERGENCY DEPT VISIT MOD MDM: CPT | Mod: Z6 | Performed by: FAMILY MEDICINE

## 2018-11-24 PROCEDURE — 86140 C-REACTIVE PROTEIN: CPT | Performed by: FAMILY MEDICINE

## 2018-11-24 PROCEDURE — 83519 RIA NONANTIBODY: CPT | Performed by: FAMILY MEDICINE

## 2018-11-24 PROCEDURE — 99284 EMERGENCY DEPT VISIT MOD MDM: CPT | Mod: 25 | Performed by: FAMILY MEDICINE

## 2018-11-24 PROCEDURE — 85025 COMPLETE CBC W/AUTO DIFF WBC: CPT | Performed by: FAMILY MEDICINE

## 2018-11-24 PROCEDURE — 80053 COMPREHEN METABOLIC PANEL: CPT | Performed by: FAMILY MEDICINE

## 2018-11-24 PROCEDURE — 84443 ASSAY THYROID STIM HORMONE: CPT | Performed by: FAMILY MEDICINE

## 2018-11-24 PROCEDURE — 70450 CT HEAD/BRAIN W/O DYE: CPT

## 2018-11-24 RX ORDER — OMEPRAZOLE 40 MG/1
40 CAPSULE, DELAYED RELEASE ORAL DAILY
Qty: 30 CAPSULE | Refills: 0 | Status: ON HOLD | OUTPATIENT
Start: 2018-11-24 | End: 2018-11-27

## 2018-11-24 NOTE — ED AVS SNAPSHOT
[facility] Emergency Department    [address] [place] [address]    Phone:  [phone]    Fax:  [phone]                                       Crescencio Moncada   MRN: [id number]    Department:  [facility] Emergency Department   Date of Visit:  11/24/2018           Patient Information     Date Of Birth          1985        Your diagnoses for this visit were:     Dysphagia     Bilateral arm weakness     Psoriasis     Hypothyroidism        You were seen by Eduardo Rodriguez MD.      Follow-up Information     Follow up with Apryl Correia APRN CNP. Schedule an appointment as soon as possible for a visit in 4 days.    Specialty:  Nurse Practitioner - Family    Contact information:    [address] [place] [address] [phone]          Follow up with [facility] Emergency Department.    Specialty:  EMERGENCY MEDICINE    Why:  If symptoms worsen    Contact information:    [address] [place] [address] [phone]    Additional information:    From [address]: Exit at [employer] [address] on south side of [place]. Turn right on [address] Turn left at stoplight on [address]. [facility] will be in view two blocks ahead        Discharge Instructions       Thank you for giving us the opportunity to see you.  We did not find an obvious neurologic cause for your dysphagia (trouble swallowing).    The CT scan of your brain was normal.  Your blood work revealed a low functioning thyroid.  A acetylcholine receptor binding test is pending.    You should follow-up with your primary care provider and discuss scheduling an outpatient videofluoroscopy swallow study.    Stay with a liquid diet or make sure that you are able to chew your solid foods carefully.    Double up on your omeprazole.    If you had lab work, cultures or imaging studies done during your stay, the final results may still be pending. We will call you if your plan  of care needs to change.     After discharge, please closely monitor for any new or worsening symptoms. Return to the Emergency Department at any time if your symptoms worsen.          Dysphagia Diet  A dysphagia diet is a special eating plan. Your healthcare provider may advise it if you have trouble swallowing (dysphagia).  Why a dysphagia diet is needed  When you have dysphagia, you are at risk for aspiration. Aspiration is when food or liquid enters the lungs by accident. It can cause pneumonia and other problems. The foods you eat can affect your ability to swallow. For example, soft foods are easier to swallow than hard foods. A dysphagia diet can help prevent aspiration. You may be at risk for aspiration from dysphagia if you have any of these medical conditions:    Stroke    Severe dental problems    Conditions that lead to less saliva, such as Sjogren syndrome    Mouth sores    Parkinson disease or other neurologic conditions    Muscular dystrophies    Blockage in the esophagus, such as a growth from cancer    History of radiation therapy or surgery for throat cancer  You may need to follow a dysphagia diet for only a short time. Or you may be on it for a while. It depends on what is causing your dysphagia and how serious it is. A speech-language pathologist (SLP) assesses a person with dysphagia. The SLP will determine your risk for aspiration and talk about the best food and drink choices for you.  Levels of a dysphagia diet  The Academy of Nutrition and Dietetics has created a diet plan for people with dysphagia. The plan is called the National Dysphagia Diet. The dysphagia diet has 4 levels of foods. The levels are:    Level 1. These are foods that are pureed or smooth, like pudding. They need no chewing. This includes foods such as yogurt, mashed potatoes with gravy to moisten it, smooth soups, and pureed vegetables and meats.    Level 2. These are moist foods that need some chewing. They include soft,  cooked, or mashed fruits or vegetables, soft or ground meats moist with gravy, cottage cheese, peanut butter, and soft scrambled eggs. You should avoid crackers, nuts, and other dry foods.    Level 3. This includes soft-solid foods that need more chewing. This includes meat, fruit, and vegetables that are easy to cut or mash. You should avoid crunchy, sticky, or very dry foods. This includes nuts, crackers, chips, and other snack foods.    Level 4. This level includes all foods.  You will also need to be careful about the liquids you drink. Talk with your SLP about the liquids that are allowed on your dysphagia diet. Here is more information on managing liquids in a dysphagia diet.  Preparing food and liquids  Your SLP will give you instructions about how to prepare your food. You may need to avoid certain foods, or make changes to some foods. For example, you may need to puree your food. Make sure to taste and season your food before pureeing it. It will be easier to adjust to a new diet if your food smells and tastes appealing.  You may also need to make liquids thicker. You can manage your liquids by making thin liquids thicker. This is done by adding a flavorless gel, gum, powder, or other liquid to it. These are called thickeners. You can also buy pre-thickened liquids. Talk with your SLP if you have any questions about managing your liquids.  While you eat  While eating or drinking, it may help to sit upright, with your back straight. You may need support pillows to get into the best position. It may also help to have few distractions while eating or drinking. Changing between solid food and liquids may also help your swallowing. Stay upright for at least 30 minutes after eating. This can help reduce the risk for aspiration.  Watch for symptoms of aspiration such as:    Coughing or wheezing during or right after eating    Excess saliva    Shortness of breath or fatigue while eating    A wet-sounding voice  during or after eating or drinking    Fever 30 to 60 minutes after eating  After you eat  After meals, it s important to do proper oral care. The SLP can give you instructions for your teeth or dentures. Make sure to not swallow any water during your oral care routine.  Checking your health  Your healthcare team will keep track of how well you are swallowing. You may need follow-up tests such as a fiberoptic endoscopic evaluation of swallowing (FEES) test. If your swallowing gets better or worse, your SLP may change your dysphagia diet over time. In time you may be able to eat and drink foods and liquids of all kinds.  Getting enough liquids  While on a dysphagia diet, you may have trouble taking in enough fluid. This can cause dehydration, which can lead to serious health problems. Talk with your healthcare team about how you can help prevent this. In some cases drinking thicker liquids may make some of your medicines work less well. Because of this, you may need some of your medicines changed for a while.  While you are on a dysphagia diet    Follow all instructions about what food and drink you can have.    Do swallowing exercises as advised.    Do not change your food or liquids, even if your swallowing gets better. Talk with your health care provider first.    Crush medicines and mix them with food as needed.    Tell all healthcare providers and caregivers that you are on a dysphagia diet. Explain which foods and liquids you can and cannot have.  Call 911  Call 911 if you have trouble breathing because of food blocking your airway.      When to call your healthcare provider  Call your healthcare provider right away if you have any of these:    Trouble swallowing that gets worse    Unplanned weight loss    Chewed food coming back up into the mouth    Vomiting   Date Last Reviewed: 3/1/2017    9298-0085 [employer]. [address] [place] [address], [place] [address]. All rights reserved. This information is  not intended as a substitute for professional medical care. Always follow your healthcare professional's instructions.            24 Hour Appointment Hotline       To make an appointment at any [facility], call [phone] ([phone]). If you don't have a family doctor or clinic, we will help you find one. [facility] are conveniently located to serve the needs of you and your family.          ED Discharge Orders     XR Video Swallow w Esophagram - Order with Speech Therapy Referral           Speech Therapy Referral       If you have not heard from the scheduling office within 2 business days, please call [phone] for all locations, with the exception of [place], please call [phone] and [facility], please call [phone].    Please be aware that coverage of these services is subject to the terms and limitations of your health insurance plan.  Call member services at your health plan with any benefit or coverage questions.                     Review of your medicines      START taking        Dose / Directions Last dose taken    omeprazole 40 MG capsule   Commonly known as:  priLOSEC   Dose:  40 mg   Quantity:  30 capsule        Take 1 capsule (40 mg) by mouth daily   Refills:  0          Our records show that you are taking the medicines listed below. If these are incorrect, please call your family doctor or clinic.        Dose / Directions Last dose taken    order for DME   Quantity:  1 Device        Equipment being ordered: wrist brace, R   Refills:  0        TYLENOL PO        Refills:  0                Prescriptions were sent or printed at these locations (1 Prescription)                   [facility] - [address] [address]    Telephone:  [phone]   Fax:  [phone]   Hours:                  Printed at Department/Unit printer (1 of 1)         omeprazole (PRILOSEC) 40 MG capsule                Procedures  "and tests performed during your visit     Acetylcholine receptor binding    CBC with platelets differential    CK total    CRP inflammation    Comprehensive metabolic panel    Erythrocyte sedimentation rate auto    Head CT w/o contrast    Peripheral IV catheter    TSH      Orders Needing Specimen Collection     None      Pending Results     Date and Time Order Name Status Description    2018 1826 Acetylcholine receptor binding In process             Pending Culture Results     No orders found from 2018 to 2018.            Pending Results Instructions     If you had any lab results that were not finalized at the time of your Discharge, you can call the ED Lab Result RN at [phone]. You will be contacted by this team for any positive Lab results or changes in treatment. The nurses are available 7 days a week from 10A to 6:30P.  You can leave a message 24 hours per day and they will return your call.        Thank you for choosing [facility]       Thank you for choosing [facility] for your care. Our goal is always to provide you with excellent care. Hearing back from our patients is one way we can continue to improve our services. Please take a few minutes to complete the written survey that you may receive in the mail after you visit with us. Thank you!        [employer] Information     [employer] lets you send messages to your doctor, view your test results, renew your prescriptions, schedule appointments and more. To sign up, go to [email or web address] . Click on \"Log in\" on the left side of the screen, which will take you to the Welcome page. Then click on \"Sign up Now\" on the right side of the page.     You will be asked to enter the access code listed below, as well as some personal information. Please follow the directions to create your username and password.     Your access code is: [email or web address]  Expires: 2019  8:16 PM     Your access code will  in 90 days. If you need help or a new " code, please call your [place] clinic or [phone].        Care EveryWhere ID     This is your Care EveryWhere ID. This could be used by other organizations to access your [place] medical records  [id number]        Equal Access to Services     ELOISA PECK : Willie Nicholas, waaxda luqadaha, qaybta kaalmada yaakov, patricia amor. [facility] [phone].    ATENCIÓN: Si habla español, tiene a mcneal disposición servicios gratuitos de asistencia lingüística. Llame al [phone].    We comply with applicable federal civil rights laws and [place] laws. We do not discriminate on the basis of race, color, national origin, age, disability, sex, sexual orientation, or gender identity.            After Visit Summary       This is your record. Keep this with you and show to your community pharmacist(s) and doctor(s) at your next visit.

## 2018-11-24 NOTE — ED AVS SNAPSHOT
[facility] Emergency Department    [address] [place] [address]    Phone:  [phone]    Fax:  [phone]                                       Crescencio Moncada   MRN: [id number]    Department:  [facility] Emergency Department   Date of Visit:  11/24/2018           After Visit Summary Signature Page     I have received my discharge instructions, and my questions have been answered. I have discussed any challenges I see with this plan with the nurse or doctor.    ..........................................................................................................................................  Patient/Patient Representative Signature      ..........................................................................................................................................  Patient Representative Print Name and Relationship to Patient    ..................................................               ................................................  Date                                   Time    ..........................................................................................................................................  Reviewed by Signature/Title    ...................................................              ..............................................  Date                                               Time          22EPIC Rev 08/18

## 2018-11-25 ENCOUNTER — HOSPITAL ENCOUNTER (INPATIENT)
Facility: CLINIC | Age: 33
LOS: 2 days | Discharge: HOME | DRG: 384 | End: 2018-11-27
Attending: INTERNAL MEDICINE | Admitting: HOSPITALIST
Payer: COMMERCIAL

## 2018-11-25 ENCOUNTER — HOSPITAL ENCOUNTER (EMERGENCY)
Facility: CLINIC | Age: 33
Discharge: ANOTHER HOSPITAL | End: 2018-11-25
Attending: FAMILY MEDICINE | Admitting: FAMILY MEDICINE
Payer: COMMERCIAL

## 2018-11-25 VITALS
DIASTOLIC BLOOD PRESSURE: 80 MMHG | HEART RATE: 68 BPM | SYSTOLIC BLOOD PRESSURE: 125 MMHG | OXYGEN SATURATION: 95 % | TEMPERATURE: 99.1 F | BODY MASS INDEX: 29.2 KG/M2 | RESPIRATION RATE: 17 BRPM | WEIGHT: 217 LBS

## 2018-11-25 DIAGNOSIS — R13.10 DYSPHAGIA, UNSPECIFIED TYPE: ICD-10-CM

## 2018-11-25 DIAGNOSIS — R29.898 UPPER EXTREMITY WEAKNESS: ICD-10-CM

## 2018-11-25 DIAGNOSIS — R49.0 DYSPHONIA: ICD-10-CM

## 2018-11-25 DIAGNOSIS — Z78.9 RECENT FOREIGN TRAVEL: ICD-10-CM

## 2018-11-25 DIAGNOSIS — K25.3 ACUTE GASTRIC EROSION: Primary | ICD-10-CM

## 2018-11-25 PROBLEM — R25.2 SPASM: Status: ACTIVE | Noted: 2018-11-25

## 2018-11-25 LAB
ALBUMIN UR-MCNC: NEGATIVE MG/DL
ANION GAP SERPL CALCULATED.3IONS-SCNC: 8 MMOL/L (ref 3–14)
APPEARANCE UR: CLEAR
BASOPHILS # BLD AUTO: 0.1 10E9/L (ref 0–0.2)
BASOPHILS NFR BLD AUTO: 1.2 %
BILIRUB UR QL STRIP: NEGATIVE
BUN SERPL-MCNC: 14 MG/DL (ref 7–30)
CALCIUM SERPL-MCNC: 8.7 MG/DL (ref 8.5–10.1)
CHLORIDE SERPL-SCNC: 109 MMOL/L (ref 94–109)
CO2 SERPL-SCNC: 26 MMOL/L (ref 20–32)
COLOR UR AUTO: YELLOW
CREAT SERPL-MCNC: 0.93 MG/DL (ref 0.66–1.25)
CRP SERPL-MCNC: <2.9 MG/L (ref 0–8)
DIFFERENTIAL METHOD BLD: NORMAL
EOSINOPHIL NFR BLD AUTO: 3.1 %
ERYTHROCYTE [DISTWIDTH] IN BLOOD BY AUTOMATED COUNT: 12.3 % (ref 10–15)
GFR SERPL CREATININE-BSD FRML MDRD: >90 ML/MIN/1.7M2
GLUCOSE SERPL-MCNC: 92 MG/DL (ref 70–99)
GLUCOSE UR STRIP-MCNC: NEGATIVE MG/DL
HCT VFR BLD AUTO: 42.9 % (ref 40–53)
HGB BLD-MCNC: 14.7 G/DL (ref 13.3–17.7)
HGB UR QL STRIP: NEGATIVE
IMM GRANULOCYTES # BLD: 0 10E9/L (ref 0–0.4)
IMM GRANULOCYTES NFR BLD: 0.3 %
KETONES UR STRIP-MCNC: NEGATIVE MG/DL
LEUKOCYTE ESTERASE UR QL STRIP: ABNORMAL
LYMPHOCYTES # BLD AUTO: 2.5 10E9/L (ref 0.8–5.3)
LYMPHOCYTES NFR BLD AUTO: 38.2 %
MCH RBC QN AUTO: 30.4 PG (ref 26.5–33)
MCHC RBC AUTO-ENTMCNC: 34.3 G/DL (ref 31.5–36.5)
MCV RBC AUTO: 89 FL (ref 78–100)
MONOCYTES # BLD AUTO: 0.7 10E9/L (ref 0–1.3)
MONOCYTES NFR BLD AUTO: 10.5 %
MUCOUS THREADS #/AREA URNS LPF: PRESENT /LPF
NEUTROPHILS # BLD AUTO: 3.1 10E9/L (ref 1.6–8.3)
NEUTROPHILS NFR BLD AUTO: 46.7 %
NITRATE UR QL: NEGATIVE
NRBC # BLD AUTO: 0 10*3/UL
NRBC BLD AUTO-RTO: 0 /100
PH UR STRIP: 7 PH (ref 5–7)
PLATELET # BLD AUTO: 241 10E9/L (ref 150–450)
POTASSIUM SERPL-SCNC: 3.5 MMOL/L (ref 3.4–5.3)
RBC # BLD AUTO: 4.83 10E12/L (ref 4.4–5.9)
RBC #/AREA URNS AUTO: 0 /HPF (ref 0–2)
SODIUM SERPL-SCNC: 143 MMOL/L (ref 133–144)
SOURCE: ABNORMAL
SP GR UR STRIP: 1.01 (ref 1–1.03)
SQUAMOUS #/AREA URNS AUTO: 1 /HPF (ref 0–1)
UROBILINOGEN UR STRIP-MCNC: 0 MG/DL (ref 0–2)
WBC # BLD AUTO: 6.6 10E9/L (ref 4–11)
WBC #/AREA URNS AUTO: 2 /HPF (ref 0–5)

## 2018-11-25 PROCEDURE — 25000132 ZZH RX MED GY IP 250 OP 250 PS 637: Performed by: FAMILY MEDICINE

## 2018-11-25 PROCEDURE — 85025 COMPLETE CBC W/AUTO DIFF WBC: CPT | Performed by: FAMILY MEDICINE

## 2018-11-25 PROCEDURE — 12000006 ZZH R&B IMCU INTERMEDIATE UMMC

## 2018-11-25 PROCEDURE — 99285 EMERGENCY DEPT VISIT HI MDM: CPT | Mod: Z6 | Performed by: FAMILY MEDICINE

## 2018-11-25 PROCEDURE — 86140 C-REACTIVE PROTEIN: CPT | Performed by: FAMILY MEDICINE

## 2018-11-25 PROCEDURE — 99219 ZZC INITIAL OBSERVATION CARE,LEVL II: CPT | Mod: AI | Performed by: HOSPITALIST

## 2018-11-25 PROCEDURE — 80048 BASIC METABOLIC PNL TOTAL CA: CPT | Performed by: FAMILY MEDICINE

## 2018-11-25 PROCEDURE — 81001 URINALYSIS AUTO W/SCOPE: CPT | Performed by: FAMILY MEDICINE

## 2018-11-25 PROCEDURE — 99285 EMERGENCY DEPT VISIT HI MDM: CPT | Mod: 25 | Performed by: FAMILY MEDICINE

## 2018-11-25 RX ORDER — IBUPROFEN 600 MG/1
600 TABLET, FILM COATED ORAL ONCE
Status: COMPLETED | OUTPATIENT
Start: 2018-11-25 | End: 2018-11-25

## 2018-11-25 RX ORDER — IBUPROFEN 600 MG/1
600 TABLET, FILM COATED ORAL EVERY 6 HOURS PRN
Status: DISCONTINUED | OUTPATIENT
Start: 2018-11-25 | End: 2018-11-27 | Stop reason: HOSPADM

## 2018-11-25 RX ORDER — NALOXONE HYDROCHLORIDE 0.4 MG/ML
.1-.4 INJECTION, SOLUTION INTRAMUSCULAR; INTRAVENOUS; SUBCUTANEOUS
Status: DISCONTINUED | OUTPATIENT
Start: 2018-11-25 | End: 2018-11-27 | Stop reason: HOSPADM

## 2018-11-25 RX ADMIN — IBUPROFEN 600 MG: 600 TABLET, FILM COATED ORAL at 20:25

## 2018-11-25 ASSESSMENT — PAIN DESCRIPTION - DESCRIPTORS
DESCRIPTORS: HEAVINESS
DESCRIPTORS: HEAVINESS

## 2018-11-25 ASSESSMENT — ACTIVITIES OF DAILY LIVING (ADL): COGNITION: 0 - NO COGNITION ISSUES REPORTED

## 2018-11-25 NOTE — ED PROVIDER NOTES
"  History     Chief Complaint   Patient presents with     Dysphagia     The history is provided by the patient.     Crescencio Moncada is a 33 year old male who presents to the emergency department with concerns of dysphagia. The patient started to have a \"sensation\" 3 days ago where he has a hard time swallowing. He says he gets these episodes where he tries to swallow, but it feels like his throat is closing and he can't breath. He does not feel like he has a foreign body in his throat, he states \"I just can't swallow\". These episodes can last anywhere from 5 seconds to and hour. He says that he has been having this sensation for about 80% of the day for the last 3 days. These episodes come randomly and have nothing to do with eating. He is still able to eat and drink normally, unless he is having one of these episodes. These episodes have been keeping him awake at night. The patient says that his jaw also started hurting when these episodes started. He states \"if I'm clenching my jaw, I don't get this feeling as much. But if I'm not clenching my jaw then it happens a lot more\". The patient has also been having some numbness and weakness in both of his arms that is worse during these episodes. He says that during the episodes he is almost not able to lift his arms up at all. The patient has no other neurological symptoms. He has no problems with walking, talking, vision changes, numbness or tingling in legs, and he does not have any headache. The patient does not have a family history of this, but says that his mom, his brother, and him all have had problems within the last 5 years with choking on their food. He says that food occasionally gets stuck in his esophagus, but eventually goes down. This dysphagia that he is having today does not feel the same as it does when he is choking on food. The patient has had ongoing stomach problems for the last year and a half and has not been able to get a diagnosis. He says " "that for the last year and a half \"my stomach would swell into my chest\". This was relieved with laying down. He had several tests done, but was not able to find a reason for this pain. He had a consultation with a GI specialist in the [facility] and was told to take Prilosec, but did not have any relief with that. Over the last 3 days, the patient has not been able to relieve this feeling with laying down because every time he lays down the dysphagia is worse.    Problem List:    Patient Active Problem List    Diagnosis Date Noted     Epigastric pain 08/16/2017     Priority: Medium     Liver lesion 08/16/2017     Priority: Medium     Syncope, unspecified syncope type 08/16/2017     Priority: Medium     Injury of right hand, initial encounter 01/09/2017     Priority: Medium     Pain of right hand 01/09/2017     Priority: Medium     Need for diphtheria-tetanus-pertussis (Tdap) vaccine, adult/adolescent 01/09/2017     Priority: Medium        Past Medical History:    History reviewed. No pertinent past medical history.    Past Surgical History:    Past Surgical History:   Procedure Laterality Date     COLONOSCOPY WITH CO2 INSUFFLATION N/A 9/11/2017    Procedure: COLONOSCOPY WITH CO2 INSUFFLATION;  EGD, Colonoscopy, Dr Correia referring, BMI 28.11, [facility] fax; [phone];  Surgeon: Amber Lowry MD;  Location:  OR     COMBINED ESOPHAGOSCOPY, GASTROSCOPY, DUODENOSCOPY (EGD) WITH CO2 INSUFFLATION N/A 9/11/2017    Procedure: COMBINED ESOPHAGOSCOPY, GASTROSCOPY, DUODENOSCOPY (EGD) WITH CO2 INSUFFLATION;  EGD, Colonoscopy, Dr Correia referring, BMI 28.11, [facility] fax; [phone];  Surgeon: Amber Lowry MD;  Location:  OR     ESOPHAGOSCOPY, GASTROSCOPY, DUODENOSCOPY (EGD), COMBINED N/A 9/11/2017    Procedure: COMBINED ESOPHAGOSCOPY, GASTROSCOPY, DUODENOSCOPY (EGD), BIOPSY SINGLE OR MULTIPLE;;  Surgeon: Amber Lowry MD;  Location:  OR       Family History:    Family History "   Problem Relation Age of Onset     Myocardial Infarction Paternal Grandfather 46     Genetic Disorder Mother      spinal muscular atrophy gene     Genetic Disorder Sister 0     spinal muscular atrophy     Coronary Artery Disease Paternal Half-Brother        Social History:  Marital Status:   [2]  Social History   Substance Use Topics     Smoking status: Former Smoker     Types: Dip, chew, snus or snuff     Smokeless tobacco: Former User     Types: Chew      Comment: every 3 days     Alcohol use Yes      Comment: 4-5 beers/day, sometimes more on the weekend        Medications:      omeprazole (PRILOSEC) 40 MG capsule   Acetaminophen (TYLENOL PO)   order for DME         Review of Systems   All other systems reviewed and are negative.      Physical Exam   BP: (!) 159/92  Heart Rate: 63  Temp: 98.3  F (36.8  C)  Resp: 18  Weight: 103.7 kg (228 lb 9.6 oz)  SpO2: 98 %      Physical Exam    GENERAL APPEARANCE: alert, oriented; no apparent distress; patient had a brief episode of choking that lasted about 5 seconds.  FACE: normal facies  EYES: PERRL, EOMI, sclera non-icteric  HENT: oral exam benign, mucus membranes intact: Oral exam is benign; uvula elevates and tongue protrudes in the midline.  NECK: no adenopathy or asymmetry, thyroid normal to palpation  RESP: lungs are clear to auscultation - no rales, rhonchi or wheezes  CV: regular rates and rhythm, no murmurs, rubs, or gallop  ABD: soft, no tenderness; no rebound or guarding; bowel sounds are normal  MS: no gross deformities noted; normal muscle tone.  EXT: No calf tenderness or pitting edema  SKIN: no worrisome rash  NEURO: no facial droop; no focal deficits, speech is normal      ED Course     ED Course     Procedures               Critical Care time:  none               Results for orders placed or performed during the hospital encounter of 11/24/18 (from the past 24 hour(s))   CBC with platelets differential   Result Value Ref Range    WBC 8.0 4.0 - 11.0  10e9/L    RBC Count 4.69 4.4 - 5.9 10e12/L    Hemoglobin 14.4 13.3 - 17.7 g/dL    Hematocrit 41.8 40.0 - 53.0 %    MCV 89 78 - 100 fl    MCH 30.7 26.5 - 33.0 pg    MCHC 34.4 31.5 - 36.5 g/dL    RDW 12.5 10.0 - 15.0 %    Platelet Count 237 150 - 450 10e9/L    Diff Method Automated Method     % Neutrophils 44.7 %    % Lymphocytes 40.9 %    % Monocytes 8.9 %    % Eosinophils 3.9 %    % Basophils 1.1 %    % Immature Granulocytes 0.5 %    Nucleated RBCs 0 0 /100    Absolute Neutrophil 3.6 1.6 - 8.3 10e9/L    Absolute Lymphocytes 3.3 0.8 - 5.3 10e9/L    Absolute Monocytes 0.7 0.0 - 1.3 10e9/L    Absolute Basophils 0.1 0.0 - 0.2 10e9/L    Abs Immature Granulocytes 0.0 0 - 0.4 10e9/L    Absolute Nucleated RBC 0.0    Comprehensive metabolic panel   Result Value Ref Range    Sodium 142 133 - 144 mmol/L    Potassium 4.1 3.4 - 5.3 mmol/L    Chloride 106 94 - 109 mmol/L    Carbon Dioxide 26 20 - 32 mmol/L    Anion Gap 10 3 - 14 mmol/L    Glucose 89 70 - 99 mg/dL    Urea Nitrogen 13 7 - 30 mg/dL    Creatinine 0.90 0.66 - 1.25 mg/dL    GFR Estimate >90 >60 mL/min/1.7m2    GFR Estimate If Black >90 >60 mL/min/1.7m2    Calcium 8.9 8.5 - 10.1 mg/dL    Bilirubin Total 0.4 0.2 - 1.3 mg/dL    Albumin 3.8 3.4 - 5.0 g/dL    Protein Total 6.4 (L) 6.8 - 8.8 g/dL    Alkaline Phosphatase 65 40 - 150 U/L    ALT 64 0 - 70 U/L    AST 45 0 - 45 U/L   TSH   Result Value Ref Range    TSH 5.36 (H) 0.40 - 4.00 mU/L   CRP inflammation   Result Value Ref Range    CRP Inflammation <2.9 0.0 - 8.0 mg/L   Erythrocyte sedimentation rate auto   Result Value Ref Range    Sed Rate 3 0 - 15 mm/h   CK total   Result Value Ref Range    CK Total 85 30 - 300 U/L   Head CT w/o contrast    Narrative    CT SCAN OF THE HEAD WITHOUT CONTRAST   11/24/2018 6:50 PM     HISTORY: Dysphagia and upper arm weakness.     TECHNIQUE:  Axial images of the head and coronal reformations without  IV contrast material.  Radiation dose for this scan was reduced using  automated exposure  control, adjustment of the mA and/or kV according  to patient size, or iterative reconstruction technique.    COMPARISON: None.    FINDINGS:  The ventricles are normal in size, shape and configuration.   The brain parenchyma and subarachnoid spaces are normal. There is no  evidence of intracranial hemorrhage, mass, acute infarct or anomaly.     The visualized portions of the sinuses and mastoids appear normal.  There is no evidence of trauma.      Impression    IMPRESSION: Normal CT scan of the head.      ELLIE DANGELO MD       Medications - No data to display    Assessments & Plan (with Medical Decision Making): Crescencio Moncada is a 33 year old male with difficulty swallowing.  Patient feels that the difficulty resides in the upper part of the chest and back of the throat.  This does not seem to be related to eating or drinking, as it can occur when he is just trying to swallow his saliva.  He states that for the last 3 days it has been occurring about 80% of the times throughout the day.  It is happening with so much frequency that he could not bear it any longer.  He has had some difficulty swallowing and has had previous workups including upper endoscopy and colonoscopy.  He then had a esophagram.  His endoscopy and colonoscopy were over a year ago, and not necessarily for this problem.  He does report upper arm weakness when he has the episode of the swallowing difficulty.  He will sometimes get some numbness in his arms.  He does not have any visual disturbance, and no other focal weakness.  There is a family history of difficulty swallowing in both patient's brother and mother.  The patient's workup today reveals a normal CBC, comprehensive metabolic panel, CRP, sed rate, and CK levels.  TSH level is 5.36 indicating mild hypothyroidism.  CT scan of the head was performed to rule out acute neurologic causes such as stroke or mass, but the results revealed a normal CT scan of the head.  There are some future  studies and his labs to look for acetylcholinesterase receptor blocker to rule out myasthenia gravis.  I added this test to his labs today.  He also reported that he was developing some new skin lesions which are consistent with psoriasis.  It is possible that he may have some underlying autoimmune process.  I will have the patient follow-up with his primary care provider sometime within the next week.  Ice try to set up an outpatient video swallow study with the speech therapist present.  Consider referral to neurology.  I will have the patient take omeprazole 40 mg daily in the interim.  Thyroid hormone replacement will be managed by his primary care provider.  Return to the ED at any time if symptoms worsen.     I have reviewed the nursing notes.    I have reviewed the findings, diagnosis, plan and need for follow up with the patient.       Discharge Medication List as of 11/24/2018  8:27 PM      START taking these medications    Details   omeprazole (PRILOSEC) 40 MG capsule Take 1 capsule (40 mg) by mouth daily, Disp-30 capsule, R-0, Local Print             Final diagnoses:   Dysphagia   Bilateral arm weakness   Psoriasis   Hypothyroidism     This document serves as a record of services personally performed by Eduardo Rodriguez MD. It was created on their behalf by Frida Huang, a trained medical scribe. The creation of this record is based on the provider's personal observations and the statements of the patient. This document has been checked and approved by the attending provider.  Note: Chart documentation done in part with Dragon Voice Recognition software. Although reviewed after completion, some word and grammatical errors may remain.    11/24/2018   [facility] EMERGENCY DEPARTMENT     Eduardo Rodriguez MD  11/24/18 0975

## 2018-11-25 NOTE — ED NOTES
Patient discharged to home after reviewing medication and discharge instruction along with follow up appointments. No questions at this time.

## 2018-11-25 NOTE — LETTER
"    Patient:  Paola Correa  :   1985  MRN:     [id number]        Mr.Jeffery LON Correa  [address] [address]        2018    Dear ,    We are writing to inform you of your test results.    The biopsies from your esophagus showed some cells called \"eosinophils\" in it. Eosinophils are allergy cells that are not normally found in the esophagus. It may be related to acid refluxing from your stomach or entity called eosinophilic esophagitis. Biopsy from your stomach was normal     We plan to treat you with medication including oral omeprazole 40 mg twice daily for 8 weeks and then repeat endoscopy with biopsies. If you still continues to have eosinophils in the biopsy from your esophagus in 8 weeks, then we will consider other medications and see you in clinic. We will arrange for your endoscopy.     If you have any questions, please don't hesitate to contact the Gastroenterology nurse at [phone].     Amy Tomlinson MD and Jacqueline Andujar MD  [facility]   Division of Gastroenterology, Hepatology and Nutrition                    Resulted Orders   Surgical pathology exam   Result Value Ref Range    Copath Report       Patient Name: PAOLA CORREA  MR#: [id number]  Specimen #: [id number]  Collected: 2018  Received: 2018  Reported: 2018 09:20  Ordering Phy(s): JACQUELINE ANDUJAR    For improved result formatting, select 'View Enhanced Report Format' under   Linked Documents section.    SPECIMEN(S):  A: Esophageal biopsy, distal  B: Esophageal biopsy, middle  C: Gastric body and antrum biopsy    FINAL DIAGNOSIS:  A. ESOPHAGEAL, DISTAL, BIOPSY:  - Squamous epithelium with markedly increased intraepithelial eosinophils   (upto 50/HPF) consistent with  eosinophilic esophagitis    B. ESOPHAGEAL, MIDDLE, BIOPSY:  - Squamous epithelium with increased intraepithelial eosinophils (upto   15/HPF) suggestive of eosinophilic  esophagitis    C. GASTRIC BODY " "AND ANTRUM BIOPSY:  - Features of mild reactive gastropathy  - No H. pylori like organisms identified on routine staining  - Negative for intestinal metaplasia or dysplasia    I have personally reviewed all specimens and/or slides, including  the   listed special stains, and used them  with my medical judgement to determine or confirm the final diagnosis.    Electronically signed out by:    Jasiel Aguila M.D., Physieran    CLINICAL HISTORY:  Dysphagia  GROSS:  A: The specimen is received in formalin with proper patient   identification, labeled \"esophagus, distal\".  The  specimen consists of four tan-brown pieces of irregular soft tissue   ranging from 0.3-0.5 cm in greatest  dimension.  The specimen is entirely submitted in cassette A1.    B: The specimen is received in formalin with proper patient   identification, labeled \"esophagus, middle\".  The  specimen consists of three tan-white irregular pieces of soft tissue   ranging from 0.2-0.3 cm in greatest  dimension.  The specimen is entirely submitted in cassette B1.    C: The specimen is received in formalin with proper patient   identification, labeled \"gastric body and antrum  \".  The specimen consists of five tan-brown pieces of irregular soft   tissue ranging from  0.2-0.6 cm in  greatest dimension.  The specimen is entirely submitted in cassette C1.   (Dictated by: Charles Garcia  11/27/2018 03:28 PM)    MICROSCOPIC:  Microscopic examination was performed.    CPT Codes:  A: 37319-XL0  B: 05377-FN7  C: 33034-DD1    TESTING LAB LOCATION:  [facility], [address]   [id number] [phone]    COLLECTION SITE:  Client: [facility]  Location: [facility] (B)           "

## 2018-11-25 NOTE — DISCHARGE INSTRUCTIONS
Thank you for giving us the opportunity to see you.  We did not find an obvious neurologic cause for your dysphagia (trouble swallowing).    The CT scan of your brain was normal.  Your blood work revealed a low functioning thyroid.  A acetylcholine receptor binding test is pending.    You should follow-up with your primary care provider and discuss scheduling an outpatient videofluoroscopy swallow study.    Stay with a liquid diet or make sure that you are able to chew your solid foods carefully.    Double up on your omeprazole.    If you had lab work, cultures or imaging studies done during your stay, the final results may still be pending. We will call you if your plan of care needs to change.     After discharge, please closely monitor for any new or worsening symptoms. Return to the Emergency Department at any time if your symptoms worsen.          Dysphagia Diet  A dysphagia diet is a special eating plan. Your healthcare provider may advise it if you have trouble swallowing (dysphagia).  Why a dysphagia diet is needed  When you have dysphagia, you are at risk for aspiration. Aspiration is when food or liquid enters the lungs by accident. It can cause pneumonia and other problems. The foods you eat can affect your ability to swallow. For example, soft foods are easier to swallow than hard foods. A dysphagia diet can help prevent aspiration. You may be at risk for aspiration from dysphagia if you have any of these medical conditions:    Stroke    Severe dental problems    Conditions that lead to less saliva, such as Sjogren syndrome    Mouth sores    Parkinson disease or other neurologic conditions    Muscular dystrophies    Blockage in the esophagus, such as a growth from cancer    History of radiation therapy or surgery for throat cancer  You may need to follow a dysphagia diet for only a short time. Or you may be on it for a while. It depends on what is causing your dysphagia and how serious it is. A  speech-language pathologist (SLP) assesses a person with dysphagia. The SLP will determine your risk for aspiration and talk about the best food and drink choices for you.  Levels of a dysphagia diet  The Academy of Nutrition and Dietetics has created a diet plan for people with dysphagia. The plan is called the National Dysphagia Diet. The dysphagia diet has 4 levels of foods. The levels are:    Level 1. These are foods that are pureed or smooth, like pudding. They need no chewing. This includes foods such as yogurt, mashed potatoes with gravy to moisten it, smooth soups, and pureed vegetables and meats.    Level 2. These are moist foods that need some chewing. They include soft, cooked, or mashed fruits or vegetables, soft or ground meats moist with gravy, cottage cheese, peanut butter, and soft scrambled eggs. You should avoid crackers, nuts, and other dry foods.    Level 3. This includes soft-solid foods that need more chewing. This includes meat, fruit, and vegetables that are easy to cut or mash. You should avoid crunchy, sticky, or very dry foods. This includes nuts, crackers, chips, and other snack foods.    Level 4. This level includes all foods.  You will also need to be careful about the liquids you drink. Talk with your SLP about the liquids that are allowed on your dysphagia diet. Here is more information on managing liquids in a dysphagia diet.  Preparing food and liquids  Your SLP will give you instructions about how to prepare your food. You may need to avoid certain foods, or make changes to some foods. For example, you may need to puree your food. Make sure to taste and season your food before pureeing it. It will be easier to adjust to a new diet if your food smells and tastes appealing.  You may also need to make liquids thicker. You can manage your liquids by making thin liquids thicker. This is done by adding a flavorless gel, gum, powder, or other liquid to it. These are called thickeners. You  can also buy pre-thickened liquids. Talk with your SLP if you have any questions about managing your liquids.  While you eat  While eating or drinking, it may help to sit upright, with your back straight. You may need support pillows to get into the best position. It may also help to have few distractions while eating or drinking. Changing between solid food and liquids may also help your swallowing. Stay upright for at least 30 minutes after eating. This can help reduce the risk for aspiration.  Watch for symptoms of aspiration such as:    Coughing or wheezing during or right after eating    Excess saliva    Shortness of breath or fatigue while eating    A wet-sounding voice during or after eating or drinking    Fever 30 to 60 minutes after eating  After you eat  After meals, it s important to do proper oral care. The SLP can give you instructions for your teeth or dentures. Make sure to not swallow any water during your oral care routine.  Checking your health  Your healthcare team will keep track of how well you are swallowing. You may need follow-up tests such as a fiberoptic endoscopic evaluation of swallowing (FEES) test. If your swallowing gets better or worse, your SLP may change your dysphagia diet over time. In time you may be able to eat and drink foods and liquids of all kinds.  Getting enough liquids  While on a dysphagia diet, you may have trouble taking in enough fluid. This can cause dehydration, which can lead to serious health problems. Talk with your healthcare team about how you can help prevent this. In some cases drinking thicker liquids may make some of your medicines work less well. Because of this, you may need some of your medicines changed for a while.  While you are on a dysphagia diet    Follow all instructions about what food and drink you can have.    Do swallowing exercises as advised.    Do not change your food or liquids, even if your swallowing gets better. Talk with your health  care provider first.    Crush medicines and mix them with food as needed.    Tell all healthcare providers and caregivers that you are on a dysphagia diet. Explain which foods and liquids you can and cannot have.  Call 911  Call 911 if you have trouble breathing because of food blocking your airway.      When to call your healthcare provider  Call your healthcare provider right away if you have any of these:    Trouble swallowing that gets worse    Unplanned weight loss    Chewed food coming back up into the mouth    Vomiting   Date Last Reviewed: 3/1/2017    [id number] [employer]. [address] [place] [address]. All rights reserved. This information is not intended as a substitute for professional medical care. Always follow your healthcare professional's instructions.

## 2018-11-25 NOTE — ED NOTES
"Pt reports \"stomach problems for a long time,\" 1.5 years.  Pt has tried meds for reflux.  Pt states for the past couple days its hard to swallow and that is why he came in today.  Pt states when he tenses jaw things \"are easier\" but now his haw hurts.  Pt states it feels like his stomach \"swells up.\"  Pt states he is \"somewhat able to eat.\"  Pt denies vomiting.  No obvious swelling in the posterior oral pharynx but it is obvious pt works hard to be able to swallow.  Pt has frequent episodes of gasping.    "

## 2018-11-25 NOTE — IP AVS SNAPSHOT
[address] [address]    Phone:  [phone]                                       After Visit Summary   11/25/2018    Crescencio Moncada    MRN: [id number]           After Visit Summary Signature Page     I have received my discharge instructions, and my questions have been answered. I have discussed any challenges I see with this plan with the nurse or doctor.    ..........................................................................................................................................  Patient/Patient Representative Signature      ..........................................................................................................................................  Patient Representative Print Name and Relationship to Patient    ..................................................               ................................................  Date                                   Time    ..........................................................................................................................................  Reviewed by Signature/Title    ...................................................              ..............................................  Date                                               Time          22EPIC Rev 08/18

## 2018-11-25 NOTE — IP AVS SNAPSHOT
MRN:[id number]                      After Visit Summary   11/25/2018    Crescencio Moncada    MRN: [id number]           Thank you!     Thank you for choosing [facility] for your care. Our goal is always to provide you with excellent care. Hearing back from our patients is one way we can continue to improve our services. Please take a few minutes to complete the written survey that you may receive in the mail after you visit with us. Thank you!        Patient Information     Date Of Birth          1985        Designated Caregiver       Most Recent Value    Caregiver    Will someone help with your care after discharge? no      About your hospital stay     You were admitted on:  November 25, 2018 You last received care in the:  [address] [facility]    You were discharged on:  November 27, 2018        Reason for your hospital stay       Difficulty swallowing                  Who to Call     For medical emergencies, please call 911.  For non-urgent questions about your medical care, please call your primary care provider or clinic, [phone]  For questions related to your surgery, please call your surgery clinic        Attending Provider     Provider Anu Arnett MD Internal Medicine    Kaylee Cartagena MD Internal Medicine    Sonu, MD Teresa Internal Medicine    Moises Dai MD Internal Medicine       Primary Care Provider Office Phone # Fax #    Apryl MAGALIS Leroy [phone]      After Care Instructions     Activity       Your activity upon discharge: activity as tolerated            Diet       Follow this diet upon discharge: Orders Placed This Encounter      Regular Diet Adult            Discharge Instructions       Avoid ibuprofen/advil/naproxen/aspirin type medications, tobacco and alcohol to help your stomach lining heal. It is important to take the omeprazole medication twice a day as well. Please see your primary  doctor in a week. It is important to utilize your primary clinic to help guide you to what kind of care you need so that you can avoid another emergency room visit. Most clinics have an after visit call line that you can phone in any concerns and they would help advise you for any symptoms you may be having.                  Follow-up Appointments     Adult [facility] Follow-up and recommended labs and tests       Follow up with primary care provider, Apryl Correia, within 7 days for hospital follow- up.  No follow up labs or test are needed.      Appointments on [place] and/or [facility] (with [facility] or [facility] provider or service). Call [phone] if you haven't heard regarding these appointments within 7 days of discharge.                  Additional Services     Speech Therapy Referral       If you have not heard from the scheduling office within 2 business days, please call [phone] for all locations, with the exception of [facility], please call [phone] and [facility], please call [phone].    Please be aware that coverage of these services is subject to the terms and limitations of your health insurance plan.  Call member services at your health plan with any benefit or coverage questions.                  Pending Results     Date and Time Order Name Status Description    11/27/2018 1430 Surgical pathology exam In process             Statement of Approval     Ordered          11/27/18 3804  I have reviewed and agree with all the recommendations and orders detailed in this document.  EFFECTIVE NOW     Approved and electronically signed by:  Margarita Colon MD             Admission Information     Date & Time Provider Department Dept. Phone    11/25/2018 Moises Dai MD [address] [facility] [phone]      Your Vitals Were     Blood Pressure Pulse Temperature Respirations Weight Pulse Oximetry    120/87 (BP Location: Left arm) 84 98.4  F (36.9  C) (Oral) 16 98.2 kg (216 lb 9.6  "oz) 95%    BMI (Body Mass Index)                   29.15 kg/m2           [employer] Information     [employer] lets you send messages to your doctor, view your test results, renew your prescriptions, schedule appointments and more. To sign up, go to [email or web address] . Click on \"Log in\" on the left side of the screen, which will take you to the Welcome page. Then click on \"Sign up Now\" on the right side of the page.     You will be asked to enter the access code listed below, as well as some personal information. Please follow the directions to create your username and password.     Your access code is: [email or web address]  Expires: 2019  8:16 PM     Your access code will  in 90 days. If you need help or a new code, please call your [facility] clinic or [phone].        Care EveryWhere ID     This is your Care EveryWhere ID. This could be used by other organizations to access your [facility] medical records  [id number]        Equal Access to Services     ELOISA PECK : Hadii dionte daveo Socarey, waaxda luqadaha, qaybta kaalmada adefortino, patricia zeng . [facility] [phone].    ATENCIÓN: Si habla español, tiene a mcneal disposición servicios gratuitos de asistencia lingüística. Llame al [phone].    We comply with applicable federal civil rights laws and [place] laws. We do not discriminate on the basis of race, color, national origin, age, disability, sex, sexual orientation, or gender identity.               Review of your medicines      CONTINUE these medicines which may have CHANGED, or have new prescriptions. If we are uncertain of the size of tablets/capsules you have at home, strength may be listed as something that might have changed.        Dose / Directions    omeprazole 40 MG DR capsule   Commonly known as:  priLOSEC   This may have changed:  when to take this   Used for:  Acute gastric erosion        Dose:  40 mg   Take 1 capsule (40 mg) by mouth 2 times daily "   Quantity:  60 capsule   Refills:  1         CONTINUE these medicines which have NOT CHANGED        Dose / Directions    ranitidine 150 MG capsule   Commonly known as:  ZANTAC        Dose:  150 mg   Take 150 mg by mouth daily as needed   Refills:  0         STOP taking     ADVIL PO                Where to get your medicines      These medications were sent to [facility] - [place], [place] - [address]  [address] [address]     Phone:  [phone]     omeprazole 40 MG DR capsule                Protect others around you: Learn how to safely use, store and throw away your medicines at www.disposemymeds.org.             Medication List: This is a list of all your medications and when to take them. Check marks below indicate your daily home schedule. Keep this list as a reference.      Medications           Morning Afternoon Evening Bedtime As Needed    omeprazole 40 MG DR capsule   Commonly known as:  priLOSEC   Take 1 capsule (40 mg) by mouth 2 times daily   Last time this was given:  40 mg on 11/27/2018  3:48 PM                                ranitidine 150 MG capsule   Commonly known as:  ZANTAC   Take 150 mg by mouth daily as needed

## 2018-11-26 ENCOUNTER — APPOINTMENT (OUTPATIENT)
Dept: GENERAL RADIOLOGY | Facility: CLINIC | Age: 33
DRG: 384 | End: 2018-11-26
Attending: PEDIATRICS
Payer: COMMERCIAL

## 2018-11-26 ENCOUNTER — APPOINTMENT (OUTPATIENT)
Dept: SPEECH THERAPY | Facility: CLINIC | Age: 33
DRG: 384 | End: 2018-11-26
Attending: PEDIATRICS
Payer: COMMERCIAL

## 2018-11-26 LAB
ACHR BIND AB SER-SCNC: 0 NMOL/L (ref 0–0.4)
ALBUMIN SERPL-MCNC: 3.6 G/DL (ref 3.4–5)
ALP SERPL-CCNC: 61 U/L (ref 40–150)
ALT SERPL W P-5'-P-CCNC: 60 U/L (ref 0–70)
ANION GAP SERPL CALCULATED.3IONS-SCNC: 7 MMOL/L (ref 3–14)
AST SERPL W P-5'-P-CCNC: 38 U/L (ref 0–45)
BASOPHILS # BLD AUTO: 0 10E9/L (ref 0–0.2)
BASOPHILS NFR BLD AUTO: 0.6 %
BILIRUB SERPL-MCNC: 0.6 MG/DL (ref 0.2–1.3)
BUN SERPL-MCNC: 14 MG/DL (ref 7–30)
CALCIUM SERPL-MCNC: 8.8 MG/DL (ref 8.5–10.1)
CHLORIDE SERPL-SCNC: 108 MMOL/L (ref 94–109)
CO2 SERPL-SCNC: 27 MMOL/L (ref 20–32)
CREAT SERPL-MCNC: 0.97 MG/DL (ref 0.66–1.25)
DIFFERENTIAL METHOD BLD: NORMAL
EOSINOPHIL # BLD AUTO: 0.2 10E9/L (ref 0–0.7)
EOSINOPHIL NFR BLD AUTO: 3.1 %
ERYTHROCYTE [DISTWIDTH] IN BLOOD BY AUTOMATED COUNT: 13 % (ref 10–15)
GFR SERPL CREATININE-BSD FRML MDRD: 88 ML/MIN/1.7M2
GLUCOSE SERPL-MCNC: 87 MG/DL (ref 70–99)
HCT VFR BLD AUTO: 44.8 % (ref 40–53)
HGB BLD-MCNC: 15 G/DL (ref 13.3–17.7)
IMM GRANULOCYTES # BLD: 0 10E9/L (ref 0–0.4)
IMM GRANULOCYTES NFR BLD: 0.3 %
LYMPHOCYTES # BLD AUTO: 3 10E9/L (ref 0.8–5.3)
LYMPHOCYTES NFR BLD AUTO: 44.7 %
MAGNESIUM SERPL-MCNC: 2.2 MG/DL (ref 1.6–2.3)
MCH RBC QN AUTO: 30.8 PG (ref 26.5–33)
MCHC RBC AUTO-ENTMCNC: 33.5 G/DL (ref 31.5–36.5)
MCV RBC AUTO: 92 FL (ref 78–100)
MONOCYTES # BLD AUTO: 0.6 10E9/L (ref 0–1.3)
MONOCYTES NFR BLD AUTO: 8.8 %
NEUTROPHILS # BLD AUTO: 2.9 10E9/L (ref 1.6–8.3)
NEUTROPHILS NFR BLD AUTO: 42.5 %
NRBC # BLD AUTO: 0 10*3/UL
NRBC BLD AUTO-RTO: 0 /100
PHOSPHATE SERPL-MCNC: 4.5 MG/DL (ref 2.5–4.5)
PLATELET # BLD AUTO: 219 10E9/L (ref 150–450)
POTASSIUM SERPL-SCNC: 3.9 MMOL/L (ref 3.4–5.3)
PROT SERPL-MCNC: 6.4 G/DL (ref 6.8–8.8)
RBC # BLD AUTO: 4.87 10E12/L (ref 4.4–5.9)
SODIUM SERPL-SCNC: 142 MMOL/L (ref 133–144)
T4 FREE SERPL-MCNC: 1.06 NG/DL (ref 0.76–1.46)
WBC # BLD AUTO: 6.7 10E9/L (ref 4–11)

## 2018-11-26 PROCEDURE — 83735 ASSAY OF MAGNESIUM: CPT | Performed by: STUDENT IN AN ORGANIZED HEALTH CARE EDUCATION/TRAINING PROGRAM

## 2018-11-26 PROCEDURE — 40000275 ZZH STATISTIC RCP TIME EA 10 MIN

## 2018-11-26 PROCEDURE — 36415 COLL VENOUS BLD VENIPUNCTURE: CPT | Performed by: STUDENT IN AN ORGANIZED HEALTH CARE EDUCATION/TRAINING PROGRAM

## 2018-11-26 PROCEDURE — 80053 COMPREHEN METABOLIC PANEL: CPT | Performed by: STUDENT IN AN ORGANIZED HEALTH CARE EDUCATION/TRAINING PROGRAM

## 2018-11-26 PROCEDURE — 25000132 ZZH RX MED GY IP 250 OP 250 PS 637: Performed by: STUDENT IN AN ORGANIZED HEALTH CARE EDUCATION/TRAINING PROGRAM

## 2018-11-26 PROCEDURE — 84100 ASSAY OF PHOSPHORUS: CPT | Performed by: STUDENT IN AN ORGANIZED HEALTH CARE EDUCATION/TRAINING PROGRAM

## 2018-11-26 PROCEDURE — 92611 MOTION FLUOROSCOPY/SWALLOW: CPT | Mod: GN

## 2018-11-26 PROCEDURE — 85025 COMPLETE CBC W/AUTO DIFF WBC: CPT | Performed by: STUDENT IN AN ORGANIZED HEALTH CARE EDUCATION/TRAINING PROGRAM

## 2018-11-26 PROCEDURE — 12000006 ZZH R&B IMCU INTERMEDIATE UMMC

## 2018-11-26 PROCEDURE — 94150 VITAL CAPACITY TEST: CPT

## 2018-11-26 PROCEDURE — 74230 X-RAY XM SWLNG FUNCJ C+: CPT

## 2018-11-26 PROCEDURE — 40000225 ZZH STATISTIC SLP WARD VISIT

## 2018-11-26 PROCEDURE — 25500045 ZZH RX 255: Performed by: RADIOLOGY

## 2018-11-26 PROCEDURE — 40000141 ZZH STATISTIC PERIPHERAL IV START W/O US GUIDANCE

## 2018-11-26 PROCEDURE — 84439 ASSAY OF FREE THYROXINE: CPT | Performed by: STUDENT IN AN ORGANIZED HEALTH CARE EDUCATION/TRAINING PROGRAM

## 2018-11-26 PROCEDURE — 0CJS8ZZ INSPECTION OF LARYNX, VIA NATURAL OR ARTIFICIAL OPENING ENDOSCOPIC: ICD-10-PCS | Performed by: OTOLARYNGOLOGY

## 2018-11-26 RX ORDER — LANOLIN ALCOHOL/MO/W.PET/CERES
3 CREAM (GRAM) TOPICAL
Status: DISCONTINUED | OUTPATIENT
Start: 2018-11-26 | End: 2018-11-27 | Stop reason: HOSPADM

## 2018-11-26 RX ADMIN — MELATONIN TAB 3 MG 3 MG: 3 TAB at 00:31

## 2018-11-26 RX ADMIN — ANTACID/ANTIFLATULENT 4 G: 380; 550; 10; 10 GRANULE, EFFERVESCENT ORAL at 12:11

## 2018-11-26 RX ADMIN — IBUPROFEN 600 MG: 600 TABLET ORAL at 14:51

## 2018-11-26 RX ADMIN — OMEPRAZOLE 40 MG: 20 CAPSULE, DELAYED RELEASE ORAL at 09:09

## 2018-11-26 ASSESSMENT — ACTIVITIES OF DAILY LIVING (ADL)
ADLS_ACUITY_SCORE: 9

## 2018-11-26 ASSESSMENT — PAIN DESCRIPTION - DESCRIPTORS
DESCRIPTORS: ACHING
DESCRIPTORS: HEADACHE
DESCRIPTORS: ACHING;CONSTANT

## 2018-11-26 NOTE — ED TRIAGE NOTES
"Pt states that over the last few days he has had multiple episodes where his throat \"doesnt feel right\" and he can't breath or swallow.  States today it got so bad that he was seeing black.    "

## 2018-11-26 NOTE — PROGRESS NOTES
[facility] [place]  Transfer Triage Note    Date of call: 11/25/18  Time of call: 7:50 PM    Reason for Transfer:Further diagnostic work up, management, and consultation for specialized care  Diagnosis: Dysphagia    Outside Records: Available  Additional records requested to be faxed to [phone].    Stability of Patient: Patient is vitally stable, with no critical labs, and will likely remain stable throughout the transfer process    Expected Time of Arrival for Transfer: 0-8 hours    Recommendations for Management and Stabilization: Given    Additional Comments 33 year old man with unremarkable PMHx, presenting after recent return from a cruise in the [facility]. Reports 1 day history of intermittent jaw tightening, R-sided neck discomfort, and dysphasgia, accompanied by a sensation of inability to breathe. Was seen in ED last night, returned tonight by EMS with persistent symptoms and upper arm weakness. Per chart, last documented tetanus vaccination was 2003 (though may have received outside of our system). He is on room air, T 99.1F, RR 18. Exam is unremarkable. Labs (visible in Epic) are all WNL, normal WBC. Dr Ponce from ID joined the call, agreed with possibility of acute botulism versus tetanus and that additional testing and ID consultation at [facility] would be reasonable. While respiratory compromise may ultimately be a concern if botulism, all agreed he would very likely remain stable for the one hour transfer this evening.     Anu Bradley MD

## 2018-11-26 NOTE — ED NOTES
Report called to Sivan SOTELO on [facility]b at the [facility] , patient complained of back of his neck and headache. Ibuprofen given. Patient and family is agreeable to transfer.

## 2018-11-26 NOTE — PHARMACY-ADMISSION MEDICATION HISTORY
Admission medication history interview status for the 11/25/2018 admission is complete. See Epic admission navigator for allergy information, pharmacy, prior to admission medications and immunization status.     Medication history interview sources:  Patient interview    Changes made to PTA medication list (reason)  Added: NA  Deleted: Tylenol  Changed: Ibuprofen dose, ranitidine frequency (daily -> PRN)    Additional medication history information (including reliability of information, actions taken by pharmacist): Pt takes ibuprofen and Zantac 2-3x weekly as needed. Only daily med is omeprazole. No additional OTC meds, vit, etc.      Prior to Admission medications    Medication Sig Last Dose Taking? Auth Provider   Ibuprofen (ADVIL PO) Take 400 mg by mouth daily as needed  11/25/2018 at Unknown time Yes Reported, Patient   omeprazole (PRILOSEC) 40 MG capsule Take 1 capsule (40 mg) by mouth daily 11/25/2018 at Unknown time Yes Eduardo Rodriguez MD   ranitidine (ZANTAC) 150 MG capsule Take 150 mg by mouth daily as needed  11/25/2018 at Unknown time Yes Reported, Patient         Medication history completed by: Ximena Camarillo, PharmD Student

## 2018-11-26 NOTE — PROGRESS NOTES
11/26/18 1100   General Information   Onset Date 11/25/18   Start of Care Date 11/26/18   Referring Physician Margarita Colon MD   Patient Profile Review/OT: Additional Occupational Profile Info See Profile for full history and prior level of function   Patient/Family Goals Statement Not stated   Swallowing Evaluation Videofluoroscopic evaluation   Behaviorial Observations WFL (within functional limits)   Mode of current nutrition Oral diet   Type of oral diet Regular;Thin liquid   Respiratory Status Room air   Comments 33yr old male with PMHx of episodic pre-syncopal episodes with  unremarkable extensive work-up, and  Intermittent pharyngeal dysphagia episodes over the last year who presents with new epidoes of what appear to be Laryngospasm vs vocal cord dysfunction,  associated with neck pain and subjective sensation of upper extremity weakness.     Clinical Swallow Evaluation   Oral Musculature generally intact   Structural Abnormalities none present   Dentition present and adequate   Mucosal Quality good   Mandibular Strength and Mobility intact   Oral Labial Strength and Mobility WFL   Lingual Strength and Mobility WFL   Velar Elevation intact   Buccal Strength and Mobility intact   Laryngeal Function Cough;Throat clear;Swallow;Voicing initiated;Dry swallow palpated   Oral Musculature Comments intact   VFSS Eval: Radiology   Radiologist resident   Views Taken left lateral   Physical Location of Procedure [facility] radiology [address]   VFSS Eval: Thin Liquid Texture Trial   Mode of Presentation, Thin Liquid cup;self-fed   Order of Presentation 1   Preparatory Phase WFL   Oral Phase, Thin Liquid WFL   Pharyngeal Phase, Thin Liquid WFL   Rosenbek's Penetration Aspiration Scale: Thin Liquid Trial Results 1 - no aspiration, contrast does not enter airway   Diagnostic Statement WFL with thin consistency liquid   VFSS Eval: Puree Solid Texture Trial   Mode of Presentation, Puree spoon;self-fed   Order of Presentation  2   Preparatory Phase WFL   Oral Phase, Puree WFL   Pharyngeal Phase, Puree WFL   Rosenbek's Penetration Aspiration Scale: Puree Food Trial Results 1 - no aspiration, contrast does not enter airway   Diagnostic Statement WFL with puree texture   Esophageal Phase of Swallow   Patient reports or presents with symptoms of esophageal dysphagia Yes   Esophageal comments see esophagram report for detail   Swallow Eval: Clinical Impressions   Skilled Criteria for Therapy Intervention No problems identified which require skilled intervention   Functional Assessment Scale (FAS) 7   Dysphagia Outcome Severity Scale (ALVA) Level 7 - ALVA   Treatment Diagnosis WNL oropharyngeal swallowing function   Diet texture recommendations Regular diet;Thin liquids  (OK from oropharyngeal perspective, defer to esophagram)   Recommended Feeding/Eating Techniques alternate between small bites and sips of food/liquid;maintain upright posture during/after eating for 30 mins;small sips/bites   Therapy Frequency (evaluation only)   Anticipated Discharge Disposition home   Risks and Benefits of Treatment have been explained. Yes   Patient, family and/or staff in agreement with Plan of Care Yes   Clinical Impression Comments Videoswallow study completed per MD order. The patient presents with intact oropharyngeal mechanism upon oral motor exam. He reports new onset of retching behavior and points to his belly. He states these episodes happen in and out of the context of the swallow. Under fluoroscopy the patient demonstrated intact oropharyngeal swallowing with a small trial of thin liquid and puree texture. The patient did experience the retching behavior and pointed to his belly to indicate the source, so at that point the radiologist decided to initiate esophagram. Defer to esophagram and possible GI consult for recommendations. Oropharyngeal swallowing appears to be intact, no further SLP tx is indicated at this time.    Total Evaluation Time    Total Evaluation Time (Minutes) 13

## 2018-11-26 NOTE — ED PROVIDER NOTES
[facility] ED Provider Note   CC:     Chief Complaint   Patient presents with     Throat Problem     HPI:  Crescencio Moncada is a 33 year old male who presented to the emergency department by EMS with acute worsening of his swallowing difficulty, but today he could not breathe.  She was seen in the emergency department last night, and gave me the impression that he had been having some progression of symptoms over several weeks to months.  He states that he came off a cruise ship yesterday, flew back into the [facility], and came straight to the emergency department to be seen.  He had been on a 7-10 day cruise to the [place] in [place], and his acute symptoms started 5 days into his trip.  Last Wednesday she developed onset of jaw pain, followed by the difficulty with swallowing.  He states that he will have some choking but will also have difficulty breathing.  This is been happening most of the day yesterday, and he came to the ER to be evaluated.  His workup was unremarkable and we were waiting on testing for possible myasthenia gravis.  Today he dates that he woke up for the first half hour and was fine, and his symptoms came back and progressed into this afternoon.  He does not feel right in his body, has weakness in the upper arms, and tonight had a severe episode where he could not catch his breath.  He could not swallow, and it was so bad that he almost passed out.  He eventually called 911.  Patient reports weakness in the upper arms, with heavy sensation.  He has some right-sided neck pulling sensation.  He felt chilled earlier today but has not had a fever.    Problem List:  Patient Active Problem List    Diagnosis     Epigastric pain     Liver lesion     Syncope, unspecified syncope type     Injury of right hand, initial encounter     Pain of right hand     Need for diphtheria-tetanus-pertussis (Tdap) vaccine, adult/adolescent        MEDS:   Current Discharge Medication List      CONTINUE these medications which have NOT CHANGED    Details   Ibuprofen (ADVIL PO) Take 200 mg by mouth      omeprazole (PRILOSEC) 40 MG capsule Take 1 capsule (40 mg) by mouth daily  Qty: 30 capsule, Refills: 0      ranitidine (ZANTAC) 150 MG capsule Take 150 mg by mouth      Acetaminophen (TYLENOL PO)              ALLERGIES:  No Known Allergies    Past Surgical History:   Procedure Laterality Date     COLONOSCOPY WITH CO2 INSUFFLATION N/A 9/11/2017    Procedure: COLONOSCOPY WITH CO2 INSUFFLATION;  EGD, Colonoscopy, Dr Correia referring, BMI 28.11, [facility] fax; [phone];  Surgeon: Amber Lowry MD;  Location: [facility] OR     COMBINED ESOPHAGOSCOPY, GASTROSCOPY, DUODENOSCOPY (EGD) WITH CO2 INSUFFLATION N/A 9/11/2017    Procedure: COMBINED ESOPHAGOSCOPY, GASTROSCOPY, DUODENOSCOPY (EGD) WITH CO2 INSUFFLATION;  EGD, Colonoscopy, Dr Correia referring, BMI 28.11, [facility] fax; [phone];  Surgeon: Amber Lowry MD;  Location: [facility] OR     ESOPHAGOSCOPY, GASTROSCOPY, DUODENOSCOPY (EGD), COMBINED N/A 9/11/2017    Procedure: COMBINED ESOPHAGOSCOPY, GASTROSCOPY, DUODENOSCOPY (EGD), BIOPSY SINGLE OR MULTIPLE;;  Surgeon: Amber Lowry MD;  Location: [facility] OR       Social History   Substance Use Topics     Smoking status: Former Smoker     Types: Dip, chew, snus or snuff     Smokeless tobacco: Former User     Types: Chew      Comment: every 3 days     Alcohol use Yes      Comment: 4-5 beers/day, sometimes more on the weekend         Review of Systems   Except as noted in HPI, all other systems were reviewed and are negative    Physical Exam     Vitals were reviewed  Patient Vitals for the past 8 hrs:   BP Temp Temp src Pulse Resp SpO2 Weight   11/25/18 1945 135/75 - - 68 20 95 % -   11/25/18 1807 (!) 148/94 99.1  F (37.3  C) Oral 73 18 99 % 98.4 kg (217 lb)     GENERAL APPEARANCE: Alert and oriented x3; occasional episodes of difficulty  swallowing with brief episodes of dyspnea  FACE: normal facies  EYES: Pupils are equal; no ophthalmopathy; no diplopia  HENT: normal external exam; uvula elevates and tongue protrudes in the midline  NECK: no adenopathy or asymmetry some: Tenderness along the right lateral neck musculature  RESP: normal respiratory effort; clear breath sounds bilaterally  CV: regular rate and rhythm; no significant murmurs, gallops or rubs  ABD: soft, mild epigastric tenderness; no rebound or guarding; bowel sounds are normal  MS: no gross deformities noted; normal muscle tone.  No muscle atrophy  EXT: No calf tenderness or pitting edema  SKIN: no worrisome rash  NEURO: no facial droop; no focal deficits, speech is normal; mild weakness in the upper extremities  PSYCH: normal mood and affect      Available Lab/Imaging Results     Results for orders placed or performed during the hospital encounter of 11/25/18 (from the past 24 hour(s))   CBC with platelets differential   Result Value Ref Range    WBC 6.6 4.0 - 11.0 10e9/L    RBC Count 4.83 4.4 - 5.9 10e12/L    Hemoglobin 14.7 13.3 - 17.7 g/dL    Hematocrit 42.9 40.0 - 53.0 %    MCV 89 78 - 100 fl    MCH 30.4 26.5 - 33.0 pg    MCHC 34.3 31.5 - 36.5 g/dL    RDW 12.3 10.0 - 15.0 %    Platelet Count 241 150 - 450 10e9/L    Diff Method Automated Method     % Neutrophils 46.7 %    % Lymphocytes 38.2 %    % Monocytes 10.5 %    % Eosinophils 3.1 %    % Basophils 1.2 %    % Immature Granulocytes 0.3 %    Nucleated RBCs 0 0 /100    Absolute Neutrophil 3.1 1.6 - 8.3 10e9/L    Absolute Lymphocytes 2.5 0.8 - 5.3 10e9/L    Absolute Monocytes 0.7 0.0 - 1.3 10e9/L    Absolute Basophils 0.1 0.0 - 0.2 10e9/L    Abs Immature Granulocytes 0.0 0 - 0.4 10e9/L    Absolute Nucleated RBC 0.0    CRP inflammation   Result Value Ref Range    CRP Inflammation <2.9 0.0 - 8.0 mg/L   Basic metabolic panel   Result Value Ref Range    Sodium 143 133 - 144 mmol/L    Potassium 3.5 3.4 - 5.3 mmol/L    Chloride 109 94 -  109 mmol/L    Carbon Dioxide 26 20 - 32 mmol/L    Anion Gap 8 3 - 14 mmol/L    Glucose 92 70 - 99 mg/dL    Urea Nitrogen 14 7 - 30 mg/dL    Creatinine 0.93 0.66 - 1.25 mg/dL    GFR Estimate >90 >60 mL/min/1.7m2    GFR Estimate If Black >90 >60 mL/min/1.7m2    Calcium 8.7 8.5 - 10.1 mg/dL   UA reflex to Microscopic   Result Value Ref Range    Color Urine Yellow     Appearance Urine Clear     Glucose Urine Negative NEG^Negative mg/dL    Bilirubin Urine Negative NEG^Negative    Ketones Urine Negative NEG^Negative mg/dL    Specific Gravity Urine 1.011 1.003 - 1.035    Blood Urine Negative NEG^Negative    pH Urine 7.0 5.0 - 7.0 pH    Protein Albumin Urine Negative NEG^Negative mg/dL    Urobilinogen mg/dL 0.0 0.0 - 2.0 mg/dL    Nitrite Urine Negative NEG^Negative    Leukocyte Esterase Urine Small (A) NEG^Negative    Source Midstream Urine     RBC Urine 0 0 - 2 /HPF    WBC Urine 2 0 - 5 /HPF    Squamous Epithelial /HPF Urine 1 0 - 1 /HPF    Mucous Urine Present (A) NEG^Negative /LPF              Impression     Final diagnoses:   Dysphagia   Upper extremity weakness   Recent foreign travel         ED Course & Medical Decision Making   Crescencio Moncada is a 33 year old male who presented to the emergency department with acute dysphagia for the past 5 days starting on the fifth day of his cruise.  Patient had been experiencing some chronic issues with his stomach for a couple of years, and was frustrated had nothing had been found.  However things were plateauing until his cruise.  He reports that he initially felt some jaw pain, followed by the onset of difficulty swallowing.  His symptoms have been progressively worse each day, and he came off the cruise ship yesterday morning, flew back to the [facility], and drove from the airport to the emergency department last night.  Please see my note from his visit last night.  Patient had a workup including CT scan of the head, CBC, competency panel, CRP, and acetylcholine esterase  receptor inhibitor testing for myasthenia gravis.  Patient came by EMS today with an acute severe episode where he could not swallow or breathe and had near syncope.  He reported to me today that he was on a cruise and there was foreign travel.  This raised the possibility of an infectious cause to his symptoms.  I did not realize that his dysphagia was acute, and now trying to piece his presentation together, it appears that he may have an acute infectious etiology to his symptoms.  The patient CBC, basic metabolic panel, CRP, and urinalysis are normal again.  He had severe chills earlier today but there has been no fever.  His temperature today is 99 degrees, blood pressure 148/94, heart rate of 73, respiratory rate of 18 with 99% oxygen saturation.  Exam reveals some mild upper extremity weakness.  Uvula elevates and tongue protrudes in the midline.  I am concerned about the possibility of an infectious neuromuscular condition such as botulism.  I discussed the case with Dr. Bradley, hospitalist, at the [facility], and we discussed the case on the phone with infectious disease specialist.  We do not have any infectious disease consultation by phone here at [facility] and it is reasonable for the patient to be transported to the [place] for further workup.  Patient will likely need some cardiopulmonary monitoring and further diagnostic workup.  Patient is stable for transfer by ground ambulance.  The patient and his wife are in agreement with this plan.              This note was dictated using electronic voice recognition software and although reviewed, may contain grammatical and spelling errors.        Eduardo Rodriguez MD  11/25/18 2029       Eduardo Rodriguez MD  11/25/18 2030

## 2018-11-26 NOTE — CONSULTS
"Otolaryngology Consult Note  November 26, 2018      CC: \"laryngospasm versus vocal cord dysfunction\"    HPI: Crescencio Moncada is a 33-year-old man with GERD and history of presyncopal episodes who presents to the hospital with episodic \"choking\" sensations. These episodes began last week during a trip out of the country to [place], [place], and the [facility] with his wife. On the fifth day of his trip he began having right jaw and neck pain, weakness of the bilateral upper extremities, and choking sensations lasting 5 or 6 seconds at a time. He cannot swallow or breathe during the episodes and feels severe \"tightness\" in his throat. They start with no provocation and are not elicited by eating. He hasn't recently been ill or vomited. He denies hoarseness or voice changes and feels relatively normal between episodes. The episode that was most impressive lasted around 10 mins. He felt short of breath, had palpitations, fell to his knees due to lack of appropriate ventilation, felt presyncopal and had chills. The episode eventually subsided, but prompted presentation to the ED. He was transferred to the [place] for evaluation of possible tetanus versus botulinum toxicity.    ROS: He also notes occasional bilateral aural fullness and rhinorrhea.    Past Medical History: GERD, pre-syncopal episodes with significant prior work-up in [id number] including CT abdomen, MRI, EEG, CT head, tryptase, TTG antibodies, 24-hour urine catecholamines, TSH, ESR, CRP, procalcitonin, Lyme antibodies, stress echo, Holter monitor, and tilt table testing - all of which were normal except for vasovagal syncope during tilt table testing.    Past Surgical History:   Procedure Laterality Date     COLONOSCOPY WITH CO2 INSUFFLATION N/A 9/11/2017    Procedure: COLONOSCOPY WITH CO2 INSUFFLATION;  EGD, Colonoscopy, Dr Correia referring, BMI 28.11, [facility] ER fax; [phone];  Surgeon: Amber Lowry MD;  Location:  OR     [facility] " "ESOPHAGOSCOPY, GASTROSCOPY, DUODENOSCOPY (EGD) WITH CO2 INSUFFLATION N/A 2017    Procedure: COMBINED ESOPHAGOSCOPY, GASTROSCOPY, DUODENOSCOPY (EGD) WITH CO2 INSUFFLATION;  EGD, Colonoscopy, Dr Correia referring, BMI 28.11, [facility] ER fax; [phone];  Surgeon: Amber Lowry MD;  Location: [facility] OR     ESOPHAGOSCOPY, GASTROSCOPY, DUODENOSCOPY (EGD), COMBINED N/A 2017    Procedure: COMBINED ESOPHAGOSCOPY, GASTROSCOPY, DUODENOSCOPY (EGD), BIOPSY SINGLE OR MULTIPLE;;  Surgeon: Amber Lowry MD;  Location: [facility] OR       No Known Allergies    Social History:  to his wife, who accompanied him on the cruise. He has 6 children. 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 Significant prior chewing tobacco use, \"more than a tin per day\" for approximately 15 years. Prior smoker, approximately 4-5 cigarettes per day. Reportedly he quit last week. Significant alcohol use, 4 drinks per day on average. His sister  at age 14 from spinal muscular atrophy. He reports frustration with his chronic health concerns and inability to find a diagnosis.    Family History   Problem Relation Age of Onset     Myocardial Infarction Paternal Grandfather 46     Genetic Disorder Mother      spinal muscular atrophy gene     Genetic Disorder Sister 0     spinal muscular atrophy     Coronary Artery Disease Paternal Half-Brother      ROS: 12 point review of systems is negative unless noted in HPI.    PHYSICAL EXAM:  General: Resting comfortably in bed, in no acute distress.  /77 (BP Location: Left arm)  Temp 98.3  F (36.8  C) (Oral)  Resp 14  Wt 99.7 kg (219 lb 12.8 oz)  SpO2 96%  BMI 29.58 kg/m2  HEAD: Normocephalic, atraumatic.  Face: Symmetrical, CN VII intact bilaterally (HB 1), no swelling, edema, or erythema. Sensation V1-V3 intact and equal bilaterally.   Eyes: EOMI without spontaneous or gaze evoked nystagmus, PERRL, clear sclera.  Ears: No tragal tenderness, normal auricles, external canals patent " bilaterally.  Nose: No anterior drainage, intact and midline septum without perforation or hematoma.   Mouth: Moist, no ulcers, no jaw or tooth tenderness, tongue midline and symmetric.  Oropharynx: Tonsils within normal limits, uvula midline, no oropharyngeal erythema.  Neck: No palpable cervical LAD, trachea midline.  Neuro: Cranial nerves 2-12 grossly intact.    Fiberoptic Laryngoscopy: Indicated for evaluation of laryngeal pathology. After obtaining verbal consent, the nose was topically decongested and anesthetized. The fiberoptic laryngoscope was passed under endoscopic vision through the left nasal passage. The turbinates were normal. The inferior and middle meati were clear bilaterally without purulence, masses, or polyps. There was prominence of the adenoid tonsils, but the nasopharynx was otherwise clear. The eustachian tubes were clear. The soft palate appeared normal with good mobility. The epiglottis was sharp, and the visualized portion of the vallecula and base of tongue clear. The larynx was clear with fully mobile cords bilaterally. No paradoxical motion was appreciated on examination. The arytenoids were clear, and there was no pooling in the hypopharynx.    ROUTINE IP LABS (Last four results)  BMP  Recent Labs  Lab 11/26/18  0444 11/25/18  1857 11/24/18  1838    143 142   POTASSIUM 3.9 3.5 4.1   CHLORIDE 108 109 106   LEATHA 8.8 8.7 8.9   CO2 27 26 26   BUN 14 14 13   CR 0.97 0.93 0.90   GLC 87 92 89     CBC  Recent Labs  Lab 11/26/18  0444 11/25/18  1857 11/24/18  1838   WBC 6.7 6.6 8.0   RBC 4.87 4.83 4.69   HGB 15.0 14.7 14.4   HCT 44.8 42.9 41.8   MCV 92 89 89   MCH 30.8 30.4 30.7   MCHC 33.5 34.3 34.4   RDW 13.0 12.3 12.5    241 237     Imaging:  Results for orders placed or performed during the hospital encounter of 11/25/18   XR Video Swallow w Esophagram    Narrative    Examination:  Esophagram and Modified Barium Swallow Study with Speech  Pathology, 11/26/2018.    History: 1.5  "years of epigastric pain and 5 days of of intermittent  spasms in the neck, chest and stomach.    Comparison: None.    Fluoroscopy time: 2 minutes.    Findings: Under fluoroscopic guidance, the patient was given orally  administered barium of varying consistencies in the presence of the  speech pathology service.  The oral phase was normal. No delay in  swallowing or pooling of contrast. Normal hyoid/laryngeal elevation  and excursion. Normal epiglottic inversion. No penetration or  aspiration with any consistency of barium. No evidence for aspiration  with thin liquid barium or barium pudding.    Free flow of contrast through the esophagus and into the stomach. Mild  concentric focal narrowing in the distal esophagus consistent with a  Schatzki's ring. Small sliding hiatal hernia. No gastroesophageal  reflux. No appreciable ulcers or masses.      Impression    Impression:   1. Mild concentric indentations along the entire esophagus may  represent eosinophilic esophagitis. Consider GI consultation for  further evaluation.  2. Schatzki's ring.  3. Small sliding hiatal hernia.   4. No penetration or aspiration with all consistencies.    Please see the speech pathologist report for further details regarding  the modified barium swallow study portion of the examination.      I have personally reviewed the examination and initial interpretation  and I agree with the findings.    ADIS REESE MD     Assessment and Plan:  Crescencio Moncada is a 33-year-old man with GERD and history of presyncopal episodes who presents to the hospital with episodic \"choking\" sensations. Current work-up is unrevealing for an etiology, although an esophagram demonstrated mild concentric indentations along the entire esophagus consistent with eosinophilic esophagitis. Of note, the patient had an episode while I was in the room with him. When palpating his anterior neck for lymphadenopathy he had a sudden tightening of his strap muscles " lasting approximately 2-3 seconds that appeared similar to retching, although he denied any nausea or sensation of needing to vomit. His flexible laryngoscopy was normal. No clear etiology for these symptoms, although muscle tension dysphonia, laryngospasm, and paradoxical vocal fold movement are all on the differential.   -No indication for acute intervention; airway fully patent at baseline.   -Recommend outpatient SLP treatment with Farida Odonnell CCC-SLP.   -Recommend continuing GI work-up given esophagram findings.    This patient was discussed with Dr. Young, who agrees with the above assessment and plan.    Petty Rosas MD PGY-1  Otolaryngology-Head & Neck Surgery    Please page ENT with questions by dialing [phone] and entering job code [phone] when prompted.

## 2018-11-26 NOTE — H&P
Internal Medicine History and Physical  [facility], [place]  Date of Admission: November 25, 2018  Chief Complaint:  -----------------------------------------------------------------  History of Present Illness     Patient reports that he was recently on a cruise to the [facility] and [place] from the 17th to the 24th.  Fourdays into the trip he began to develop jaw spasms and jaw pain as well as intermittent episodes of spasms in his throat which he describes as, a sudden urge to swallow and inability to do so that was associated with the inability to take a breath in.  These episodes were not associated with eating or drinking and he was able to eat and drink apart from these episodes.  The following day he developed neck pain and upper extremity weakness.  He presented to the emergency department after returning from his cruise and was found to have an unremarkable exam and neg pre-liminary  workup and so was discharged.  He returned to the emergency room on the evening of 11/25 due to recurrent throat spasms which had caused him to fall to the floor and feel like he was going to pass out due to not being able to get any air in.  He did not lose consciousness. He reports that he also felt weak, shaky, felt his heart pounding, and got very cold.  The description of his symptoms at the outside ED raise concern for botulism toxin or tetanus so he was transferred to the [facility] for further workup.      Patient denies any blurry vision, facial droop, wounds, scratches, tick bites or other insect bites. No fevers, abdominal symptoms, or urinary symptoms.  He reports eating fish on his cruise but does not believe he had shellfish or puffer fish.  He has not had any antibiotics in the last few weeks.  He denies any drug use or supplement use including any new experiences during his cruise.  He believes his last tetanus shot was in 2003.  He received all his  childhood vaccinations including polio.  He denies any recent viral illnesses.  He denies any history of localized muscle fatigue or blurry vision at the end of the day, but does report that he often feels very weak intermittently.    Of note patient Pt has a remote hx ([id number]) of presyncopal episodes (episodic dizziness, palpitations, diaphoresis and abdominal pain) with two admissions in July of 2017 for more frequent episodes and extensive work-up which was largely non-revealing including:  Tilt table testing - positive indicating vasovagal   Holter monitor - some sinus tamia but otherwise unremarkable    Stress echo -neg  At that time TSH was wnl and no electrolyte abnormalities   Infectious work-up neg (esr, crp, procal, treponema, lyme)   24hr urine collection for catecholamines, normal   TTg antibody neg   Tryptase for histamine intolerance wnl  CT head neg  EEG neg  MRI 12/2016 wnl  CT abd unremarkable     Following these episodes he also developed intermittent dysphasia to solids and abnormal GI sensations for which he has also been worked-up with EGD plus biopsies and a small bowel follow-through, both of which were unremarkable.  He reports a sensation of food getting stuck in his throat but no difficulty initiating swallow.  He reports a feeling of abdominal distention and movement which he describes as a bubble that moves when he lies down or rolls over.  He presented again in Jan of this year with epigastric pain and had abdominal US which was normal.    -----------------------------------------------------------------  Assessment & Plan   33yr old male with PMHx of episodic pre-syncopal episodes with  unremarkable extensive work-up, and  Intermittent pharyngeal dysphagia episodes over the last year who presents with new epidoes of what appear to be Laryngospasm vs vocal cord dysfunction,  associated with neck pain and subjective sensation of upper extremity weakness.        # Jaw pain and  involuntary clenching  Associated with neck pain and subjective feeling of upper extremity weakness, likely secondary to Laryngospasm versus vocal cord dysfunction.  Also possible esophageal spasm given patient's history of intermittent pharyngeal dysphasia.  History and exam did not fit descending paralysis making tetanus and botulism unlikely.  Additionally no wounds appreciated on skin exam or per history. However will monitor on telemetry and continuous pulse ox.   -Continue to monitor patient overnight   -N.p.o. Overnight  -Consider ENT evaluation in the morning    #UE weakness  Subjective. Not demonstrated currently on exam.  Has history of symptoms that would suggest myasthenia gravis or Eaton-Lambert syndrome.   -monitor neuro exam     #Neck pain  No nuchal rigidity on exam and no fevers or altered mental status rule out meningitis.  Likely muscle strain from jaw clenching.      Diet:   Active Diet Order      NPO for Medical/Clinical Reasons Except for: Meds  Fluids: None  Lines: PIV  [place] Catheter: not present  DVT Prophylaxis: pneumoboots  Code Status: Full  Expected discharge: 2 - 3 days +    Pt was staffed with Dr. Osiel Alvares MD  Medicine-Pediatrics PGY2    [facility]  Pager: [phone]  -----------------------------------------------------------------  Physical Exam   /90 (BP Location: Left arm)  Temp 98  F (36.7  C) (Oral)  Resp 16  SpO2 98%    GENERAL: Active, alert, in no acute distress.  SKIN: well demarcated, raised, erythematous plaque with silvery scale on left upper forearm near elbow and on right shin below the knee   HEENT: Normocephalic. Pupils equal, round, reactive, Extraocular muscles intact. No nystagmus. Normal conjunctivae.  Oral mucosa non-erythematous. No oral ulcers or other lesions . No ptosis   NECK: Supple, no masses.  No increased tenderness to palpation   LYMPH NODES: No adenopathy  LUNGS: Clear. No rales, rhonchi,  wheezing. No increased WOB. Sating well on RA  HEART: Regular rhythm. Normal S1/S2. No murmurs. Normal pulses.  ABDOMEN: Soft, non-tender, not distended, no masses or hepatosplenomegaly. Bowel sounds normal.   NEUROLOGIC: CNs II-XII intact, normal coordination by finger-nose-finger, 5/5 strength in upper and lower extremities and finger grasp, sensation in tact, answers appropriately, A&Ox4  EXTREMITIES: Full range of motion, no deformities     -----------------------------------------------------------------  Additional Patient History  Review of Systems   The 10 point Review of Systems is negative other than noted in the HPI     Past Medical History    I have reviewed this patient's medical history and updated it with pertinent information if needed.   No past medical history on file.       Past Surgical History   I have reviewed this patient's surgical history and updated it with pertinent information if needed.  Past Surgical History:   Procedure Laterality Date     COLONOSCOPY WITH CO2 INSUFFLATION N/A 9/11/2017    Procedure: COLONOSCOPY WITH CO2 INSUFFLATION;  EGD, Colonoscopy, Dr Correia referring, BMI 28.11, [facility]  fax; [phone];  Surgeon: Amber Lowry MD;  Location:  OR     COMBINED ESOPHAGOSCOPY, GASTROSCOPY, DUODENOSCOPY (EGD) WITH CO2 INSUFFLATION N/A 9/11/2017    Procedure: COMBINED ESOPHAGOSCOPY, GASTROSCOPY, DUODENOSCOPY (EGD) WITH CO2 INSUFFLATION;  EGD, Colonoscopy, Dr Correia referring, BMI 28.11, [facility] ER fax; [phone];  Surgeon: Amber Lowry MD;  Location:  OR     ESOPHAGOSCOPY, GASTROSCOPY, DUODENOSCOPY (EGD), COMBINED N/A 9/11/2017    Procedure: COMBINED ESOPHAGOSCOPY, GASTROSCOPY, DUODENOSCOPY (EGD), BIOPSY SINGLE OR MULTIPLE;;  Surgeon: Amber Lowry MD;  Location:  OR       Social History   Social History   Substance Use Topics     Smoking status: Former Smoker     Types: Dip, chew, snus or snuff     Smokeless tobacco: Former User      Types: Chew      Comment: every 3 days     Alcohol use Yes      Comment: 4-5 beers/day, sometimes more on the weekend     Quit chewing tobacco about 2 years ago  Quit smoking 1 week ago  No drug use     Family History   I have reviewed this patient's family history and updated it with pertinent information if needed.   Family History   Problem Relation Age of Onset     Myocardial Infarction Paternal Grandfather 46     Genetic Disorder Mother      spinal muscular atrophy gene     Genetic Disorder Sister 0     spinal muscular atrophy     Coronary Artery Disease Paternal Half-Brother        Prior to Admission Medications     Current Facility-Administered Medications on File Prior to Encounter:  [COMPLETED] ibuprofen (ADVIL/MOTRIN) tablet 600 mg     Current Outpatient Prescriptions on File Prior to Encounter:  Acetaminophen (TYLENOL PO)    Ibuprofen (ADVIL PO) Take 200 mg by mouth   omeprazole (PRILOSEC) 40 MG capsule Take 1 capsule (40 mg) by mouth daily   ranitidine (ZANTAC) 150 MG capsule Take 150 mg by mouth       Allergies    No Known Allergies    Data: Review in Epic.

## 2018-11-26 NOTE — PLAN OF CARE
Problem: Patient Care Overview  Goal: Plan of Care/Patient Progress Review  Discharge Planner SLP   Patient plan for discharge: not stated  Current status: Videoswallow study completed per MD order. The patient presents with intact oropharyngeal mechanism upon oral motor exam. He reports new onset of retching behavior and points to his belly as the source. He states these episodes happen in and out of the context of the swallow, no direct relation noted by him. Under fluoroscopy the patient demonstrated intact oropharyngeal swallowing with a small trial of thin liquid and puree texture. The patient did experience the retching behavior and pointed to his belly to indicate the source, so at that point the radiologist decided to initiate the esophagram as there were no obvious oropharyngeal abnormalities present. Defer to esophagram and possible GI consult for recommendations. Oropharyngeal swallowing appears to be intact, no further SLP tx is indicated at this time.   Barriers to return to prior living situation: none from SLP perspective  Recommendations for discharge: defer to medical team  Rationale for recommendations: no further dysphagia tx indicated at this time       Entered by: Juana Leung 11/26/2018 4:32 PM

## 2018-11-26 NOTE — PLAN OF CARE
Admission          11/25/2018 10:11 PM  -----------------------------------------------------------  Reason for admission: Dysphagia  Primary team notified of pt arrival.  Admitted from: [place] ED  Via: stretcher  Accompanied by: Self  Belongings: Placed at bedside  Admission Profile: complete  Teaching: orientation to unit and call light- call light within reach, call don't fall, use of console, meal times, when to call for the RN, and enforced importance of safety   Access: R PIV  Telemetry:Placed on pt  Ht./Wt.: complete  2 RN Skin Assessment Completed By: Sivan AGRAWAL and Kobe SOTELO   Pt status: Alert and oriented, 98% on RA, denies SOB. Complains of neck tightness.     /90 (BP Location: Left arm)  Temp 98  F (36.7  C) (Oral)  Resp 16  SpO2 98%

## 2018-11-27 ENCOUNTER — ANESTHESIA EVENT (OUTPATIENT)
Dept: GASTROENTEROLOGY | Facility: CLINIC | Age: 33
DRG: 384 | End: 2018-11-27
Payer: COMMERCIAL

## 2018-11-27 ENCOUNTER — ANESTHESIA (OUTPATIENT)
Dept: GASTROENTEROLOGY | Facility: CLINIC | Age: 33
DRG: 384 | End: 2018-11-27
Payer: COMMERCIAL

## 2018-11-27 ENCOUNTER — PATIENT OUTREACH (OUTPATIENT)
Dept: CARE COORDINATION | Facility: CLINIC | Age: 33
End: 2018-11-27

## 2018-11-27 VITALS
TEMPERATURE: 98.4 F | DIASTOLIC BLOOD PRESSURE: 87 MMHG | SYSTOLIC BLOOD PRESSURE: 120 MMHG | OXYGEN SATURATION: 95 % | HEART RATE: 84 BPM | RESPIRATION RATE: 16 BRPM | WEIGHT: 216.6 LBS | BODY MASS INDEX: 29.15 KG/M2

## 2018-11-27 LAB — UPPER GI ENDOSCOPY: NORMAL

## 2018-11-27 PROCEDURE — 0DB78ZX EXCISION OF STOMACH, PYLORUS, VIA NATURAL OR ARTIFICIAL OPENING ENDOSCOPIC, DIAGNOSTIC: ICD-10-PCS | Performed by: INTERNAL MEDICINE

## 2018-11-27 PROCEDURE — 43239 EGD BIOPSY SINGLE/MULTIPLE: CPT | Performed by: INTERNAL MEDICINE

## 2018-11-27 PROCEDURE — 88305 TISSUE EXAM BY PATHOLOGIST: CPT | Performed by: INTERNAL MEDICINE

## 2018-11-27 PROCEDURE — 99238 HOSP IP/OBS DSCHRG MGMT 30/<: CPT | Mod: GC | Performed by: INTERNAL MEDICINE

## 2018-11-27 PROCEDURE — 37000009 ZZH ANESTHESIA TECHNICAL FEE, EACH ADDTL 15 MIN: Performed by: INTERNAL MEDICINE

## 2018-11-27 PROCEDURE — 0DB28ZX EXCISION OF MIDDLE ESOPHAGUS, VIA NATURAL OR ARTIFICIAL OPENING ENDOSCOPIC, DIAGNOSTIC: ICD-10-PCS | Performed by: INTERNAL MEDICINE

## 2018-11-27 PROCEDURE — 25000128 H RX IP 250 OP 636: Performed by: NURSE ANESTHETIST, CERTIFIED REGISTERED

## 2018-11-27 PROCEDURE — 37000008 ZZH ANESTHESIA TECHNICAL FEE, 1ST 30 MIN: Performed by: INTERNAL MEDICINE

## 2018-11-27 PROCEDURE — 25000132 ZZH RX MED GY IP 250 OP 250 PS 637: Performed by: STUDENT IN AN ORGANIZED HEALTH CARE EDUCATION/TRAINING PROGRAM

## 2018-11-27 PROCEDURE — 0DB38ZX EXCISION OF LOWER ESOPHAGUS, VIA NATURAL OR ARTIFICIAL OPENING ENDOSCOPIC, DIAGNOSTIC: ICD-10-PCS | Performed by: INTERNAL MEDICINE

## 2018-11-27 PROCEDURE — 25000125 ZZHC RX 250: Performed by: NURSE ANESTHETIST, CERTIFIED REGISTERED

## 2018-11-27 PROCEDURE — 25000132 ZZH RX MED GY IP 250 OP 250 PS 637: Performed by: PEDIATRICS

## 2018-11-27 RX ORDER — LIDOCAINE HYDROCHLORIDE 20 MG/ML
INJECTION, SOLUTION INFILTRATION; PERINEURAL PRN
Status: DISCONTINUED | OUTPATIENT
Start: 2018-11-27 | End: 2018-11-27

## 2018-11-27 RX ORDER — PROPOFOL 10 MG/ML
INJECTION, EMULSION INTRAVENOUS PRN
Status: DISCONTINUED | OUTPATIENT
Start: 2018-11-27 | End: 2018-11-27

## 2018-11-27 RX ORDER — ONDANSETRON 4 MG/1
4 TABLET, ORALLY DISINTEGRATING ORAL EVERY 30 MIN PRN
Status: CANCELLED | OUTPATIENT
Start: 2018-11-27

## 2018-11-27 RX ORDER — ONDANSETRON 2 MG/ML
4 INJECTION INTRAMUSCULAR; INTRAVENOUS EVERY 30 MIN PRN
Status: CANCELLED | OUTPATIENT
Start: 2018-11-27

## 2018-11-27 RX ORDER — PROPOFOL 10 MG/ML
INJECTION, EMULSION INTRAVENOUS CONTINUOUS PRN
Status: DISCONTINUED | OUTPATIENT
Start: 2018-11-27 | End: 2018-11-27

## 2018-11-27 RX ORDER — OMEPRAZOLE 40 MG/1
40 CAPSULE, DELAYED RELEASE ORAL 2 TIMES DAILY
Qty: 60 CAPSULE | Refills: 1 | Status: SHIPPED | OUTPATIENT
Start: 2018-11-27 | End: 2018-12-04

## 2018-11-27 RX ORDER — NALOXONE HYDROCHLORIDE 0.4 MG/ML
.1-.4 INJECTION, SOLUTION INTRAMUSCULAR; INTRAVENOUS; SUBCUTANEOUS
Status: CANCELLED | OUTPATIENT
Start: 2018-11-27 | End: 2018-11-28

## 2018-11-27 RX ORDER — SODIUM CHLORIDE, SODIUM LACTATE, POTASSIUM CHLORIDE, CALCIUM CHLORIDE 600; 310; 30; 20 MG/100ML; MG/100ML; MG/100ML; MG/100ML
INJECTION, SOLUTION INTRAVENOUS CONTINUOUS PRN
Status: DISCONTINUED | OUTPATIENT
Start: 2018-11-27 | End: 2018-11-27

## 2018-11-27 RX ORDER — SODIUM CHLORIDE, SODIUM LACTATE, POTASSIUM CHLORIDE, CALCIUM CHLORIDE 600; 310; 30; 20 MG/100ML; MG/100ML; MG/100ML; MG/100ML
INJECTION, SOLUTION INTRAVENOUS CONTINUOUS
Status: CANCELLED | OUTPATIENT
Start: 2018-11-27

## 2018-11-27 RX ORDER — MEPERIDINE HYDROCHLORIDE 25 MG/ML
12.5 INJECTION INTRAMUSCULAR; INTRAVENOUS; SUBCUTANEOUS
Status: CANCELLED | OUTPATIENT
Start: 2018-11-27

## 2018-11-27 RX ORDER — FENTANYL CITRATE 50 UG/ML
INJECTION, SOLUTION INTRAMUSCULAR; INTRAVENOUS PRN
Status: DISCONTINUED | OUTPATIENT
Start: 2018-11-27 | End: 2018-11-27

## 2018-11-27 RX ADMIN — LIDOCAINE HYDROCHLORIDE 15 ML: 20 SOLUTION ORAL; TOPICAL at 14:08

## 2018-11-27 RX ADMIN — FENTANYL CITRATE 25 MCG: 50 INJECTION, SOLUTION INTRAMUSCULAR; INTRAVENOUS at 14:24

## 2018-11-27 RX ADMIN — MIDAZOLAM 2 MG: 1 INJECTION INTRAMUSCULAR; INTRAVENOUS at 14:05

## 2018-11-27 RX ADMIN — PROPOFOL 50 MG: 10 INJECTION, EMULSION INTRAVENOUS at 14:13

## 2018-11-27 RX ADMIN — FENTANYL CITRATE 50 MCG: 50 INJECTION, SOLUTION INTRAMUSCULAR; INTRAVENOUS at 14:11

## 2018-11-27 RX ADMIN — FENTANYL CITRATE 25 MCG: 50 INJECTION, SOLUTION INTRAMUSCULAR; INTRAVENOUS at 14:26

## 2018-11-27 RX ADMIN — LIDOCAINE HYDROCHLORIDE 40 MG: 20 INJECTION, SOLUTION INFILTRATION; PERINEURAL at 14:11

## 2018-11-27 RX ADMIN — PROPOFOL 100 MCG/KG/MIN: 10 INJECTION, EMULSION INTRAVENOUS at 14:10

## 2018-11-27 RX ADMIN — SODIUM CHLORIDE, POTASSIUM CHLORIDE, SODIUM LACTATE AND CALCIUM CHLORIDE: 600; 310; 30; 20 INJECTION, SOLUTION INTRAVENOUS at 14:00

## 2018-11-27 RX ADMIN — PROPOFOL 40 MG: 10 INJECTION, EMULSION INTRAVENOUS at 14:15

## 2018-11-27 RX ADMIN — OMEPRAZOLE 40 MG: 20 CAPSULE, DELAYED RELEASE ORAL at 15:48

## 2018-11-27 RX ADMIN — OMEPRAZOLE 40 MG: 20 CAPSULE, DELAYED RELEASE ORAL at 08:09

## 2018-11-27 ASSESSMENT — ACTIVITIES OF DAILY LIVING (ADL)
ADLS_ACUITY_SCORE: 9

## 2018-11-27 NOTE — CONSULTS
GASTROENTEROLOGY CONSULTATION      Date of Admission: 11/25/2018       Date of Consult: 11/27/18          Chief Complaint:   We were asked by Margarita Colon MD to evaluate this patient with dysphagia.          ASSESSMENT AND RECOMMENDATIONS:   Assessment:  Crescencio Moncada is a 33 year old male with a history of episodic pre-syncopal episodes with  unremarkable extensive work-up, and intermittent pharyngeal dysphagia episodes over the last year who presents with new episodes of pharyngeal and esophageal dysphagia.    #Pharygeal dysphagia and mid-esophageal dysphagia  #NSAID use  #Tobacco and alcohol use  Patient had EGD and colonoscopy that were negative in fall 2017.  Fiberoptic laryngoscopy completed by ENT was negative. And esophagram from 11/26/18 showed mild concentric indentations along the entire esophagus suspicious of eosinophilic esophagitis, Schatzki ring, and small sliding hiatal hernia but no penetration or aspiration noted.  Labs are unremarkable, except there is elevated TSH.    Patient abdominal symptoms might be related to anxiety.  However, his dysphasia in the setting of NSAID, alcohol, and tobacco use, the there is a possibility that he has esophagitis, gastritis, versus peptic ulcer disease.  Plan is to continue with PPI, and proceed with EGD for endoscopic evaluation.  Recommendations  --NPO for EGD  --Continue with once daily PPI  --Assess patient for anxiety or a possible panic attack   --Avoid NSAIDs  --Alcohol and tobacco cessation     Gastroenterology follow up recommendations: TBD    Patient care plan discussed with Dr. Anudjar, GI staff physician. Thank you for involving us in this patient's care. Please do not hesitate to contact the GI service with any questions or concerns.     Donna Mcqueen CNP  Department of Gastroenterology   -------------------------------------------------------------------------------------------------------------------   History is obtained from the  patient and the medical record.          History of Present Illness:   Crescencio Moncada is a 33 year old male with a history of episodic pre-syncopal episodes with  unremarkable extensive work-up, and intermittent pharyngeal dysphagia episodes over the last year who presents with new episodes of pharyngeal and esophageal dysphagia.    Patient describes chronic symptoms that have been present in the past 1.5-2yrs; he feels intermittent sensation of his stomach swelling up and pressing into upper cavity, then he feels that he cannot breath. However, the recent he is here is because he is having hard time swallowing liquids and solids.  He describes food getting stuck in pharyngeal phase and also mid esophageal phase.  He also has intermittent burning and sharp pain in mid chest.  He denies fever, nausea, vomiting, diarrhea, hematemesis or hematochezia.  He admits having constipations at times but not today.  And at times he feels food comes up to his throat, and after drinking water he feels everything washes down.  All his symptoms started about 2 weeks ago when he was at on a cruise vacation at [place].  Patient has been taking 200 mg of ibuprofen daily in the past 6 months.  He also drinks 3-4 beers per day, and has been smoking up to 4-5 cigarettes/day.  Few months ago, he also used to chew tobacco in addition to smoking.  Patient is the only breadwinner in his household and he would like to go back to work as soon as possible.        Past Medical History:   Reviewed and edited as appropriate  History reviewed. No pertinent past medical history.         Past Surgical History:   Reviewed and edited as appropriate   Past Surgical History:   Procedure Laterality Date     COLONOSCOPY WITH CO2 INSUFFLATION N/A 9/11/2017    Procedure: COLONOSCOPY WITH CO2 INSUFFLATION;  EGD, Colonoscopy, Dr Correia referring, BMI 28.11[phone];  Surgeon: Amber Lowry MD;  Location:  OR     COMBINED  ESOPHAGOSCOPY, GASTROSCOPY, DUODENOSCOPY (EGD) WITH CO2 INSUFFLATION N/A 9/11/2017    Procedure: COMBINED ESOPHAGOSCOPY, GASTROSCOPY, DUODENOSCOPY (EGD) WITH CO2 INSUFFLATION;  EGD, Colonoscopy, Dr Correia referring, BMI 28.11, [facility] fax; [phone];  Surgeon: Amber Lowry MD;  Location: [facility] OR     ESOPHAGOSCOPY, GASTROSCOPY, DUODENOSCOPY (EGD), COMBINED N/A 9/11/2017    Procedure: COMBINED ESOPHAGOSCOPY, GASTROSCOPY, DUODENOSCOPY (EGD), BIOPSY SINGLE OR MULTIPLE;;  Surgeon: Amber Lowry MD;  Location: [facility] OR            Previous Endoscopy:     Results for orders placed or performed during the hospital encounter of 09/11/17   COLONOSCOPY   Result Value Ref Range    COLONOSCOPY       [facility]  Endoscopy Department-[place]  _______________________________________________________________________________  Patient Name: Crescencio Moncada           Procedure Date: 9/11/2017 11:41 AM  MRN: [id number]                       YOB: 1985  Admit Type: Outpatient                Age: 31  Gender: Male                          Note Status: Finalized  Attending MD: Amber Lowry MD     _______________________________________________________________________________     Procedure:                Colonoscopy  Indications:              Generalized abdominal pain  Providers:                Amber Lowry MD, Eulalio Carter RN  Referring MD:             Apryl Correia  Medicines:                See the other procedure note for documentation of                             the administered medications, additional Fentanyl                             25 micrograms IV, Midazolam 1 mg IV given  Complications:            No immediate  complications. Estimated blood loss:                             None.  _______________________________________________________________________________  Procedure:                After obtaining informed consent, the colonoscope                              was passed under direct vision. Throughout the                             procedure, the patient's blood pressure, pulse, and                             oxygen saturations were monitored continuously. The                             Colonoscope was introduced through the anus and                             advanced to the terminal ileum, with identification                             of the appendiceal orifice and IC valve. The                             colonoscopy was performed without difficulty. The                             patient tolerated the procedure well. The quality                             of the bowel preparation was evaluated using the                             BBPS ([place] Bowel Preparation Scale) with sc ores                             of: Right Colon = 3, Transverse Colon = 3 and Left                             Colon = 3 (entire mucosa seen well with no residual                             staining, small fragments of stool or opaque                             liquid). The total BBPS score equals 9.                                                                                   Findings:       The perianal and digital rectal examinations were normal.       The entire examined colon and terminal ileum appeared normal on direct        and retroflexion views.                                                                                   Moderate Sedation:       Moderate (conscious) sedation was administered by the endoscopy nurse        and supervised by the endoscopist. The following parameters were        monitored: oxygen saturation, heart rate, blood pressure, and response        to care. Total physician intraservice time was 10 minutes.  Impression:               - The entir e examined colon and terminal ileum is                             normal on direct and retroflexion views.                            - No specimens collected.  Recommendation:            - Discharge patient to home.                            - Repeat colonoscopy at age 48 for screening                             purposes.                                                                                     _____________________  Amber Lowry MD  9/11/2017 12:31:10 PM  I was physically present for the entire viewing portion of the exam.Amber Lowry MD  Number of Addenda: 0    Note Initiated On: 9/11/2017 11:41 AM  Scope In:  Scope Out:              Social History:   Reviewed and edited as appropriate  Social History     Social History     Marital status:      Spouse name: N/A     Number of children: N/A     Years of education: N/A     Occupational History     Not on file.     Social History Main Topics     Smoking status: Former Smoker     Types: Dip, chew, snus or snuff     Quit date: 11/20/2017     Smokeless tobacco: Former User     Types: Chew      Comment: every 3 days     Alcohol use Yes      Comment: 4-5 beers/day, sometimes more on the weekend     Drug use: No     Sexual activity: Yes     Partners: Female     Other Topics Concern     Not on file     Social History Narrative            Family History:   Reviewed and edited as appropriate  Family History   Problem Relation Age of Onset     Myocardial Infarction Paternal Grandfather 46     Genetic Disorder Mother      spinal muscular atrophy gene     Genetic Disorder Sister 0     spinal muscular atrophy     Coronary Artery Disease Paternal Half-Brother            Allergies:   Reviewed and edited as appropriate   No Known Allergies         Medications:     Prescriptions Prior to Admission   Medication Sig Dispense Refill Last Dose     Ibuprofen (ADVIL PO) Take 400 mg by mouth daily as needed    Past Week at Unknown time     omeprazole (PRILOSEC) 40 MG capsule Take 1 capsule (40 mg) by mouth daily 30 capsule 0 11/27/2018 at Unknown time     ranitidine (ZANTAC) 150 MG capsule Take 150 mg by mouth daily as needed    Past  Week at Unknown time             Review of Systems:   A complete review of systems was performed and is negative except as noted in the HPI           Physical Exam:   /87  Temp 98.1  F (36.7  C) (Oral)  Resp 14  Wt 98.2 kg (216 lb 9.6 oz)  SpO2 97%  BMI 29.15 kg/m2  Wt:   Wt Readings from Last 2 Encounters:   11/27/18 98.2 kg (216 lb 9.6 oz)   11/25/18 98.4 kg (217 lb)      Constitutional: cooperative, pleasant, not dyspneic/diaphoretic, no acute distress  Eyes: Sclera anicteric/injected  Ears/nose/mouth/throat: Normal oropharynx without ulcers or exudate, mucus membranes moist, hearing intact  Neck: supple, thyroid normal size  CV: RRR. No edema in LE bilaterally  Respiratory: Unlabored breathing. CTA bilaterally   Lymph: No axillary, submandibular, supraclavicular or inguinal lymphadenopathy  Abd: Nondistended, +bs, no hepatosplenomegaly, nontender, no peritoneal signs  Skin: warm, perfused, no jaundice  Neuro: AAO x 3, No asterixis  Psych: Normal affect  MSK: Normal gait         Data:   Labs and imaging below were independently reviewed and interpreted    BMP  Recent Labs  Lab 11/26/18 0444 11/25/18 1857 11/24/18 1838    143 142   POTASSIUM 3.9 3.5 4.1   CHLORIDE 108 109 106   LEATHA 8.8 8.7 8.9   CO2 27 26 26   BUN 14 14 13   CR 0.97 0.93 0.90   GLC 87 92 89     CBC  Recent Labs  Lab 11/26/18 0444 11/25/18 1857 11/24/18  1838   WBC 6.7 6.6 8.0   RBC 4.87 4.83 4.69   HGB 15.0 14.7 14.4   HCT 44.8 42.9 41.8   MCV 92 89 89   MCH 30.8 30.4 30.7   MCHC 33.5 34.3 34.4   RDW 13.0 12.3 12.5    241 237     INRNo lab results found in last 7 days.  LFTs  Recent Labs  Lab 11/26/18 0444 11/24/18  1838   ALKPHOS 61 65   AST 38 45   ALT 60 64   BILITOTAL 0.6 0.4   PROTTOTAL 6.4* 6.4*   ALBUMIN 3.6 3.8      PANCNo lab results found in last 7 days.    Reviewed imaging:   Esophagram and Modified Barium Swallow Study with Speech  Pathology, 11/26/2018.     History: 1.5 years of epigastric pain and 5 days  of of intermittent  spasms in the neck, chest and stomach.     Comparison: None.     Fluoroscopy time: 2 minutes.     Findings: Under fluoroscopic guidance, the patient was given orally  administered barium of varying consistencies in the presence of the  speech pathology service.  The oral phase was normal. No delay in  swallowing or pooling of contrast. Normal hyoid/laryngeal elevation  and excursion. Normal epiglottic inversion. No penetration or  aspiration with any consistency of barium. No evidence for aspiration  with thin liquid barium or barium pudding.     Free flow of contrast through the esophagus and into the stomach. Mild  concentric focal narrowing in the distal esophagus consistent with a  Schatzki's ring. Small sliding hiatal hernia. No gastroesophageal  reflux. No appreciable ulcers or masses.     Impression:   1. Mild concentric indentations along the entire esophagus may  represent eosinophilic esophagitis. Consider GI consultation for  further evaluation.  2. Schatzki's ring.  3. Small sliding hiatal hernia.   4. No penetration or aspiration with all consistencies.

## 2018-11-27 NOTE — PROGRESS NOTES
[facility] Internal Medicine Progress Note    Crescencio Moncada   MRN: [id number]    :1985  Date of Admission:2018  DOS : 2018     Interval History:  NAEON. Reports two episodes of gasping overnight but was otherwise able to sleep. Aware of EGD plan for today and no further questions or new complaints.     Medications: reviewed in Epic    Objective:  Vitals:    18 1451 18 1452 18 1459 18 1500   BP:  122/80  116/76   BP Location:       Pulse:       Resp: 8  13 12   Temp:       TempSrc:       SpO2: 96% 97% 95% 95%   Weight:         Vitals : reviewed in Epic. Significant for : stable vitals  Gen: NAD   CV: RRR, no m/r/g  Resp: CTAB, no w/r/r, no respiratory distress  Abd: +BS, soft, NTND, loud sound made with swallowing   Neuro: A&Ox3, no focal motor or sensory defitis  Skin: scaly patches noted on L elbow and R knee; two tattoos noted on LUE and LLE  Ext: no edema    Labs: reviewed in Epic. Significant for:   -Labs thus far all within normal limits: inflammatory studies wnl   -ACH R ab negative     Imaging reviewed today:     CT head  IMPRESSION: Normal CT scan of the head.     Esophogram  Impression:   1. Mild concentric indentations along the entire esophagus may  represent eosinophilic esophagitis. Consider GI consultation for  further evaluation.  2. Schatzki's ring.  3. Small sliding hiatal hernia.   4. No penetration or aspiration with all consistencies.     EGD  >normal esophagus, biopsied for EOE, multiple gastric antral erosions, biopsied for H. pylori, normal duodenum    Assessment:  32yo M with prior history of pre-syncopal episodes with negative extensive work up and intermittent dysphagia with associated difficulty breathing admitted for work up for dysphagia with ddx including laryngospasm vs. Vocal cord dysfunction vs. Esophageal pathology (eosinophilic esophagitis or Schatzki's ring).     Plan:  #Dysphagia  -Kept NPO initially and underwent swallow  study with speech therapy (within normal limits) days.  Esophagram revealed findings of Schatzki's ring and concentric indentations along the esophagus suggestive of eosinophilic esophagitis in addition to a small sliding hiatal hernia.  -ENT consult, apprec recs   >Flexible laryngoscopy was normal. Additional differentials suggested include muscle tension dysphonia vs. Laryngospasm vs. Paradoxical vocal fold movement    >Recommend outpatient SLP treatment with Farida Odonnell CCC-SLP  -GI consult, apprec recs   >NPO at [facility] for EGD:     -revealed multiple gastric antral erosions likely secondary to tobacco, EtOH and NSAID use    -PPI PO BID     DVT Prophy : Ambulate  Access/Lines: PIV  Code Status: Full  Disposition: Anticipate discharge later today or tomorrow pending tolerance of diet post-EGD  Pt seen and discussed with Dr. Gilberto Dai, staff attending, who agrees with above assessment and plan.     Margarita Colon MD  Medicine-Pediatrics, PGY-4  [phone]    DOS : 11/27/2018  Please see sticky note for cross-cover contact information

## 2018-11-27 NOTE — ANESTHESIA PREPROCEDURE EVALUATION
Anesthesia Pre-Procedure Evaluation    Patient: Crescencio Moncada   MRN:     [id number] Gender:   male   Age:    33 year old :      1985        Preoperative Diagnosis: Dysphagia   Procedure(s):  COMBINED ESOPHAGOSCOPY, GASTROSCOPY, DUODENOSCOPY (EGD)     No past medical history on file.   Past Surgical History:   Procedure Laterality Date     COLONOSCOPY WITH CO2 INSUFFLATION N/A 2017    Procedure: COLONOSCOPY WITH CO2 INSUFFLATION;  EGD, Colonoscopy, Dr Correia referring, BMI 28.11, [facility] fax; [phone];  Surgeon: Amber Lowry MD;  Location: [facility] OR     COMBINED ESOPHAGOSCOPY, GASTROSCOPY, DUODENOSCOPY (EGD) WITH CO2 INSUFFLATION N/A 2017    Procedure: COMBINED ESOPHAGOSCOPY, GASTROSCOPY, DUODENOSCOPY (EGD) WITH CO2 INSUFFLATION;  EGD, Colonoscopy, Dr Correia referring, BMI 28.11, [facility] fax; [phone];  Surgeon: Amber Lowry MD;  Location: [facility] OR     ESOPHAGOSCOPY, GASTROSCOPY, DUODENOSCOPY (EGD), COMBINED N/A 2017    Procedure: COMBINED ESOPHAGOSCOPY, GASTROSCOPY, DUODENOSCOPY (EGD), BIOPSY SINGLE OR MULTIPLE;;  Surgeon: Amber Lowry MD;  Location: [facility] OR               [facility] AN PHYSICAL EXAM    Lab Results   Component Value Date    WBC 6.7 2018    HGB 15.0 2018    HCT 44.8 2018     2018    CRP <2.9 2018    SED 3 2018     2018    POTASSIUM 3.9 2018    CHLORIDE 108 2018    CO2 27 2018    BUN 14 2018    CR 0.97 2018    GLC 87 2018    LEATHA 8.8 2018    PHOS 4.5 2018    MAG 2.2 2018    ALBUMIN 3.6 2018    PROTTOTAL 6.4 (L) 2018    ALT 60 2018    AST 38 2018    ALKPHOS 61 2018    BILITOTAL 0.6 2018    LIPASE 154 2017    AMYLASE 46 2017    TSH 5.36 (H) 2018    T4 1.06 2018       Preop Vitals  BP Readings from Last 3 Encounters:   18 118/73   18 125/80   18 (!) 159/92     "Pulse Readings from Last 3 Encounters:   11/25/18 68   07/25/18 78   09/13/17 79      Resp Readings from Last 3 Encounters:   11/27/18 14   11/25/18 17   11/24/18 18    SpO2 Readings from Last 3 Encounters:   11/27/18 97%   11/25/18 95%   11/24/18 98%      Temp Readings from Last 1 Encounters:   11/27/18 36.7  C (98.1  F) (Oral)    Ht Readings from Last 1 Encounters:   08/02/18 1.836 m (6' 0.28\")      Wt Readings from Last 1 Encounters:   11/27/18 98.2 kg (216 lb 9.6 oz)    Estimated body mass index is 29.15 kg/(m^2) as calculated from the following:    Height as of 8/2/18: 1.836 m (6' 0.28\").    Weight as of this encounter: 98.2 kg (216 lb 9.6 oz).     LDA:  Peripheral IV 11/26/18 Left;Anterior Upper forearm (Active)   Site Assessment WDL 11/27/2018  8:00 AM   Line Status Saline locked 11/27/2018  8:00 AM   Phlebitis Scale 0-->no symptoms 11/27/2018  8:00 AM   Infiltration Scale 0 11/27/2018  8:00 AM   Extravasation? No 11/27/2018  4:00 AM   Number of days:1            Assessment:   ASA SCORE: 3    NPO Status: > 6 hours since completed Solid [employer]   Documentation: H&P complete; Preop Testing complete; Consents complete   Proceeding: Proceed without further delay  Tobacco Use:  NO Active use of Tobacco/UNKNOWN Tobacco use status     Plan:   Anes. Type:  MAC   Pre-Induction: Midazolam IV   Induction:  IV (Standard)   Airway: Native Airway   Access/Monitoring: PIV   Maintenance: Propofol; IV   Emergence: Procedure Site   Logistics: Same Day Surgery     Postop Pain/Sedation Strategy:  Standard-Options: Opioids PRN     PONV Management:  Adult Risk Factors:, Non-Smoker, Postop Opioids  Prevention: Propofol Infusion     CONSENT: Direct conversation   Plan and risks discussed with: Patient   Blood Products: Consent Deferred (Minimal Blood Loss)                I have examined the patient and reviewed the record with the above resident or APN and agree with above assessment and recommendations.     Abel Burrell" M.D.  Staff Anesthesiologist  November 27, 2018, 11:48 AM             Abel Burrell MD

## 2018-11-27 NOTE — PLAN OF CARE
Problem: Patient Care Overview  Goal: Plan of Care/Patient Progress Review  Outcome: No Change  Pt A&O X4, VSS, afebrile, HR sinus rhythm 60's (tamia in 50's while sleeping), BP's 110-120's/60-70's, O2 sats > 90% on RA. LS clear, BS+ X4, CMS intact. Pt slept majority of overnight shift, had no requests/complaints, reported tolerable headache, declined PRN's. PIV in L arm patent & SL. Gets up ad ryan, ambulates independently to restroom. Voiding urine adequately, passing gas, no BM. Has been on regular diet with no nausea/emesis, NPO at midnight. Has call light within reach, able to make needs known, will continue to monitor per POC, plan for GI consult today.

## 2018-11-27 NOTE — PLAN OF CARE
Problem: Patient Care Overview  Goal: Plan of Care/Patient Progress Review  Outcome: No Change  Pt A/Ox4 pleasant and cooperative.  Pt up independently t/o shift.  Pt reports frequent episodes of trouble swallowing and catching breath during shift, only visualized 1 during shift while pt was having scope by ENT.  Pt reports heavy arms and numbness intermittently at times as well during shift.  Pt denies episodes of dizziness (history of vasovagal events in past year).  No ectopy noted on tele, pt tamia 50-60's while awake, 40-50's while sleeping.  Pt on regular diet, no problems with eating, though pt does report problems with food getting stuck at times in the past.  ENT consulted and GI, plan for pt to be NPO at midnight in anticipation for Gi tomorrow. Vs stable during shift, see flowsheets.

## 2018-11-27 NOTE — PROGRESS NOTES
[facility] Internal Medicine Progress Note    Crescencio Moncada   MRN: [id number]    :1985  Date of Admission:2018  DOS : 2018     Interval History:  Patient provides detailed history of recent travel to assess for any exposures that can explain his symptoms.  He reports him and his wife flew to [place] on  through [place] and stayed at [place] the first night.  They aborted the cruise and the first stop was the [facility] in a private island, where they sat on the beach for about 6 hours and swim in the ocean.  On the second day, they were just on the cruise without any notable exposures.  The third day, they went to the [facility] and spent about 7 hours at the beach, swimming in the ocean.  On the next day, they were out in [place] and this was the most activities they had planned.  They hiked by a waterfall and swim in the ocean, eat and drink by a river with some more adventurous food though they thought it was mostly cooked.  Denies any numbness around the mouth with eating.  They did spend about 8 hours out in [place].  On day 5 they were on the cruise ship and on day 6 they went to [facility] and spent time at the beach, parasailing, getting a massage and swimming for duration of 6 hours.  On day 7, they did return to [place].  His symptoms began on day 5, the day after the excursion to [place].  He describes waking up in the morning that day with pain in his bottom jaw that he described as a clenching muscle pain.  He did try Advil that day with some relief, however, this pain lasted the majority of the day.  He found it harder to drink water and that his chest felt full with drinking he then felt that this jaw discomfort moved to his throat and he had difficulty swallowing though not specifically associated with food or water.  He also developed difficulty breathing at the time where the dysphasia was present.  He has noted shortness of breath with movement requiring him to  "take a deep breath.  He also describes numbness in his upper extremities, though he says this has been going on for a couple of years he denies any acute weakness of the arms but rather numbness from fingertips to shoulder on both sides.  He denies any vision changes or any weakness with rising from a chair.  He has not had ongoing abdominal discomfort which she describes as \"swelling up\" that comes and goes and feels like his stomach is pushing up into his chest.  He reports drinking 3-4 beers per day every day.  He has a history of chewing tobacco and quit smoking last week.  He does have 6 children ag ranging from age 2-13es, all who are in good health.  He clarifies his family history of SMA with his sister who passed at age 14 from this disease.  His mother is a carrier for this disease.  He further clarifies that he had some hospitalizations in 2017 for presyncope and those symptoms have largely resolved and are not part of his current process.  On his trip, he denies any animal exposures or any obvious insect bites.  For last each month and a half, he has had experienced runny nose.    Medications: reviewed in Epic    Objective:  Vitals:    11/26/18 0500 11/26/18 0741 11/26/18 1248 11/26/18 1605   BP: 126/76 125/86 137/77 132/83   BP Location: Left arm Left arm Left arm Left arm   Resp: 16 16 14 16   Temp: 97.8  F (36.6  C) 97.9  F (36.6  C) 98.3  F (36.8  C) 98.2  F (36.8  C)   TempSrc: Oral Oral Oral Oral   SpO2: 96% 97% 96% 95%   Weight:         Vitals : reviewed in Epic. Significant for : stable vitals  Gen: NAD, able to provide full story with the help of his wife  CV: RRR, no m/r/g  Resp: CTAB, no w/r/r, no respiratory distress  Abd: +BS, soft, NTND, loud sound made with swallowing   Neuro: A&Ox3, no focal motor or sensory defitis  Skin: scaly patches noted on L elbow and R knee; two tattoos noted on LUE and LLE  Ext: no edema    Labs: reviewed in Epic. Significant for:   -Labs thus far all within " normal limits: inflammatory studies wnl   -ACH R ab negative     Imaging reviewed today:    11/24 CT head  IMPRESSION: Normal CT scan of the head.    Assessment:  34yo M with prior history of pre-syncopal episodes with negative extensive work up and intermittent dysphagia with associated difficulty breathing admitted for work up for dysphagia with ddx including laryngospasm vs. Vocal cord dysfunction vs. Esophageal pathology.     Plan:  #Dysphagia  -Kept NPO initially and underwent swallow study with speech therapy (within normal limits) days.  Esophagram revealed findings of Schatzki's ring and concentric indentations along the esophagus suggestive of eosinophilic esophagitis in addition to a small sliding hiatal hernia.  -ENT consult, apprec recs  -GI consult, apprec recs   >NPO at MN in case EGD for eval tomorrow   -After ENT and GI consult, if no organic etiology found, it would be reasonable to discharge patient with close outpatient follow-up and consider speech pathology referral for dysphasia sensation.  -Continue oral PPI      DVT Prophy : Ambulate  Access/Lines: PIV  Code Status: Full  Disposition: Anticipate 1-3 days to workup etiology of dysphasia    Pt seen and discussed with Dr. ELLIS, staff attending, who agrees with above assessment and plan.     Margarita Colon MD  Medicine-Pediatrics, PGY-4  [phone]    DOS : 11/26/2018  Please see sticky note for cross-cover contact information

## 2018-11-27 NOTE — PLAN OF CARE
Problem: Dysphagia (Adult)  Goal: Identify Related Risk Factors and Signs and Symptoms  Related risk factors and signs and symptoms are identified upon initiation of [facility] Clinical Practice Guideline (CPG).   Outcome: Improving  Neuro: A&Ox4.   Cardiac: SR to sinus tamia. VSS. Afebrile.    Respiratory: Sating high 90s on RA.  GI/: Adequate urine output. No BM, passing gas. EGD done this shift.   Diet/appetite: NPO.   Activity:  Independent, up to shower.   Pain: Denies   Skin: No new deficits noted.  LDA's: PIV flushes without difficulty.     Plan: Continue with POC. Notify primary team with changes. EGD done this shift. Pt denies jaw pain or pharyngeal spasms throughout shift.

## 2018-11-27 NOTE — ANESTHESIA POSTPROCEDURE EVALUATION
Anesthesia POST Procedure Evaluation    Patient: Crescencio Moncada   MRN:     [id number] Gender:   male   Age:    33 year old :      1985        Preoperative Diagnosis: Dysphagia   Procedure(s):  COMBINED ESOPHAGOSCOPY, GASTROSCOPY, DUODENOSCOPY (EGD), BIOPSY SINGLE OR MULTIPLE   Postop Comments: No value filed.       Anesthesia Type:  Not documented    Reportable Event: NO     PAIN: Uncomplicated   Sign Out status: Comfortable, Well controlled pain     PONV: No PONV   Sign Out status:  No Nausea or Vomiting     Neuro/Psych: Uneventful perioperative course   Sign Out Status: Preoperative baseline; Age appropriate mentation     Airway/Resp.: Uneventful perioperative course   Sign Out Status: Non labored breathing, age appropriate RR; Resp. Status within EXPECTED Parameters     CV: Uneventful perioperative course   Sign Out status: Appropriate BP and perfusion indices; Appropriate HR/Rhythm     Disposition:   Sign Out in:  PACU  Disposition:  Phase II; Home  Recovery Course: Uneventful  Follow-Up: Not required           Last Anesthesia Record Vitals:  CRNA VITALS  2018 1403 - 2018 1452      2018             SpO2: 98 %          Last PACU/Preop Vitals:  Vitals:    18 0745 18 1325 18 1439   BP: 118/73 127/87 122/77   Pulse:   84   Resp: 14 14 15   Temp: 36.7  C (98.1  F)  36.4  C (97.6  F)   SpO2: 97% 97% 97%         Electronically Signed By: Abel Burrell MD, 2018, 2:52 PM

## 2018-11-27 NOTE — ANESTHESIA CARE TRANSFER NOTE
Patient: Crescencio Moncada    Procedure(s):  COMBINED ESOPHAGOSCOPY, GASTROSCOPY, DUODENOSCOPY (EGD), BIOPSY SINGLE OR MULTIPLE    Diagnosis: Dysphagia  Diagnosis Additional Information: No value filed.    Anesthesia Type:   No value filed.     Note:  Airway :Room Air  Patient transferred to:Phase II  Comments: Pt spont resp. Awake and conversing. To Phase II.  Report to RN at bedside.Handoff Report: Identifed the Patient, Identified the Reponsible Provider, Reviewed the pertinent medical history, Discussed the surgical course, Reviewed Intra-OP anesthesia mangement and issues during anesthesia, Set expectations for post-procedure period and Allowed opportunity for questions and acknowledgement of understanding      Vitals: (Last set prior to Anesthesia Care Transfer)    CRNA VITALS  11/27/2018 1403 - 11/27/2018 1439      11/27/2018             SpO2: 98 %                Electronically Signed By: MAGALIS Nagy CRNA  November 27, 2018  2:39 PM

## 2018-11-28 LAB — COPATH REPORT: NORMAL

## 2018-11-28 NOTE — DISCHARGE SUMMARY
[facility], [place]  Discharge Summary    Date of Admission:  11/25/2018  Date of Discharge:  11/27/2018  6:19 PM  Discharging Provider: Margarita Colon  Date of Service (when I saw the patient): 11/27/18    Discharge Diagnoses    Dysphagia/Dysphonia  Abdominal Pain  Erosive gastropathy  Probably anxiety disorder     History of Present Illness   33yr old male with PMHx of episodic pre-syncopal episodes with  unremarkable extensive work-up, and  Intermittent pharyngeal dysphagia episodes over the last year who presents with new epidoes of what appear to be Laryngospasm vs vocal cord dysfunction,  associated with neck pain and subjective sensation of upper extremity weakness.      Hospital Course   Crescencio Moncada was admitted on 11/25/2018.  The following problems were addressed during his hospitalization:    #Dysphagia/Dysphonia   #Pharyngeal spasm  #Erosive gastropathy   He was kept NPO initially and underwent swallow study with speech therapy with normal findings of muscular evaluation. Bedside Negative inspiratory force completed with respiratory therapy were within normal range. Esophagram revealed findings of Schatzki's ring and concentric indentations along the esophagus suggestive of eosinophilic esophagitis in addition to a small sliding hiatal hernia. ENT was then consulted and his bedside flexible laryngoscopy was normal. ENT did recommend outpatient Speech Language Pathology with Farida Odonnell CCC-SLP (order placed on discharge). To rule out esophageal pathology, GI was consulted for EGD with multiple gastric antral erosions suspected to be related to tobacco, EtOH and NSAID use. He was recommended to start omeprazole PO BID for 8 weeks. Surgical pathology from biopsies pending at time of discharge. Patient was counseled on importance of alcohol cessation and minimizing NSAID exposure as he has already quit smoking last week. He was strongly encouraged to follow up with SLP  to help with his dysphagia/dysphonia. Additionally, he is open to the possibility of exploring therapy options for anxiety with his PCP (counseled on options from regular daily medications to PRN medications to counseling). He was certainly more agreeable to the possibility of his symptoms being related to anxiety at the time of discharge.  His telemetry and oxygenation monitoring during hospitalization was completely normal without any concerning findings.     Significant Results and Procedures     EGD:  Impression:          - Normal esophagus. Biopsied for EOE from mid and distal                        esophagus.                        - Z-line regular, 45 cm from the incisors.                        - Erosive gastropathy. Biopsied for H. pylori                        - Normal duodenal bulb, first portion of the duodenum                        and second portion of the duodenum.   Recommendation:      - Await pathology results.                        - [facility]                        - Use Prilosec (omeprazole) 40 mg PO BID for 8 weeks.                        - No etiology of patients symptoms on this exam.                        - Would consider anxiety/panic attacks as a possible                        cause of symptoms - including globus (rather than                        dysphagia)       Esophogram  Impression:   1. Mild concentric indentations along the entire esophagus may  represent eosinophilic esophagitis. Consider GI consultation for  further evaluation.  2. Schatzki's ring.  3. Small sliding hiatal hernia.   4. No penetration or aspiration with all consistencies.    Head CT w/o  IMPRESSION: Normal CT scan of the head.     Pending Results   These results will be followed up by GI providers and PCP    Unresulted Labs Ordered in the Past 30 Days of this Admission     Date and Time Order Name Status Description    11/27/2018 1430 Surgical pathology exam In process            Primary Care Physician   Apryl Correia  Home clinic: [id number] [place] [address]    Physical Exam   Vital Signs with Ranges  Temp:  [97.6  F (36.4  C)-98.4  F (36.9  C)] 98.4  F (36.9  C)  Pulse:  [84] 84  Heart Rate:  [56-64] 58  Resp:  [8-16] 16  BP: (114-127)/(65-87) 120/87  SpO2:  [95 %-97 %] 95 %  I/O last 3 completed shifts:  In: 680 [P.O.:480; I.V.:200]  Out: 600 [Urine:600]  Vitals : reviewed in Epic. Significant for : stable vitals  Gen: NAD   CV: RRR, no m/r/g  Resp: CTAB, no w/r/r, no respiratory distress  Abd: +BS, soft, NTND, loud sound made with swallowing   Neuro: A&Ox3, no focal motor or sensory defitis  Skin: scaly patches noted on L elbow and R knee; two tattoos noted on LUE and LLE  Ext: no edema    Time Spent on this Encounter   I, Margarita Colon, personally saw the patient today and spent less than or equal to 30 minutes discharging this patient.    Pt discussed with Dr. Dai, staff attending, who agrees with above assessment and plan.     Margarita Colon MD  Medicine-Pediatrics, PGY-4     Discharge Disposition   Discharged to home  Condition at discharge: Stable    Consultations This Hospital Stay   SWALLOW EVAL SPEECH PATH AT BEDSIDE IP CONSULT  VASCULAR ACCESS CARE ADULT IP CONSULT  ENT IP CONSULT  GI LUMINAL ADULT IP CONSULT  MEDICATION HISTORY IP PHARMACY CONSULT    Discharge Orders     Speech Therapy Referral     Reason for your hospital stay   Difficulty swallowing     Adult [facility] Follow-up and recommended labs and tests   Follow up with primary care provider, Apryl Correia, within 7 days for hospital follow- up.  No follow up labs or test are needed.      Appointments on [facility] and/or [facility] (with [facility] or [facility] provider or service). Call [phone] if you haven't heard regarding these appointments within 7 days of discharge.     Activity   Your activity upon discharge: activity as tolerated     Discharge Instructions   Avoid  ibuprofen/advil/naproxen/aspirin type medications, tobacco and alcohol to help your stomach lining heal. It is important to take the omeprazole medication twice a day as well. Please see your primary doctor in a week. It is important to utilize your primary clinic to help guide you to what kind of care you need so that you can avoid another emergency room visit. Most clinics have an after visit call line that you can phone in any concerns and they would help advise you for any symptoms you may be having.     Full Code     Diet   Follow this diet upon discharge: Orders Placed This Encounter     Regular Diet Adult       Discharge Medications   Discharge Medication List as of 11/27/2018  5:59 PM      CONTINUE these medications which have CHANGED    Details   omeprazole (PRILOSEC) 40 MG DR capsule Take 1 capsule (40 mg) by mouth 2 times daily, Disp-60 capsule, R-1, E-Prescribe         CONTINUE these medications which have NOT CHANGED    Details   ranitidine (ZANTAC) 150 MG capsule Take 150 mg by mouth daily as needed , Historical         STOP taking these medications       Ibuprofen (ADVIL PO) Comments:   Reason for Stopping:             Allergies   No Known Allergies  Data   Significant labs summarized above under problem list

## 2018-11-28 NOTE — PLAN OF CARE
Problem: Patient Care Overview  Goal: Plan of Care/Patient Progress Review  Outcome: Improving  Pt A/Ox4 pleasant and cooperative, pt back from EGD at 1500, wife at bedside.  VS stable.  MD up to see pt this evening, discussed EGD findings, recommended pt avoid advil, alcohol and tobacco.  Pt to start taking prilosec 40 mg BID and zantac daily prn, follow up with PCP in 1 week and speech therapy.  Discharge instructions reviewed with patient and wife, both verbalized understanding.  PIV removed, tip intact.  Pt and wife packed up all their belongings.

## 2018-11-29 NOTE — PROGRESS NOTES
Patient was called three times and no answer so post 24 hr DC follow up calls will be closed out, message was left with contact number for department seen by or following up.    Follow up with primary care provider, Apryl Correia, within 7 days for hospital follow- up.  No follow up labs or test are needed.

## 2018-11-29 NOTE — PROGRESS NOTES
SUBJECTIVE:   Crescencio Moncada is a 33 year old male who presents to clinic today for the following health issues:    GI  Speech therapy      [facility]      Hospital Follow-up Visit:    Hospital/Nursing Home/IP Rehab Facility: [facility]  Date of Admission: 11/25/2018  Date of Discharge: 11/27/2018  Reason(s) for Admission: dysphagia/dysphonia, abdominal pain, erosive gastropathy, probable anxiety disorder            Problems taking medications regularly:  Started taking omeprazole about 8 days ago, takes ranitidine PRN       Medication changes since discharge: None       Problems adhering to non-medication therapy:  None    Summary of hospitalization:  [facility] discharge summary reviewed  Diagnostic Tests/Treatments reviewed.  Follow up needed: GI, speech therapy and dermatology  Other Healthcare Providers Involved in Patient s Care:         Specialist appointment - see above referrals  Update since discharge: stable. The biopsy results of his EGD were consistent with eosinophilic esophagitis. He has not scheduled an appointment with GI yet. He continues to have episodes of feeling like he cannot swallow and then he will have difficulty taking a breath. He becomes very anxious when this happens. He denies feeling anxious other than when these episodes occur. He has been taking omeprazole and ranitidine as prescribed. His appetite is good. Bowel movements are normal. He continues to have episodes where it feels like his stomach is swollen just beneath his sternum but he has not been able to correlate this with any particular food or cause. He has feels like his arms are weaker but not significantly. 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  He denies nausea and vomiting. Family history of spinal muscular atrophy, mom is a carrier and sister has disease. No other autoimmune disease history in the family.     Post Discharge Medication Reconciliation: discharge medications  reconciled, continue medications without change.  Plan of care communicated with patient     Coding guidelines for this visit:  Type of Medical   Decision Making Face-to-Face Visit       within 7 Days of discharge Face-to-Face Visit        within 14 days of discharge   Moderate Complexity [id number]   High Complexity [id number]              [facility] [place]  Discharge Summary     Date of Admission:  11/25/2018  Date of Discharge:  11/27/2018  6:19 PM  Discharging Provider: Margarita Colon  Date of Service (when I saw the patient): 11/27/18        Discharge Diagnoses      Dysphagia/Dysphonia  Abdominal Pain  Erosive gastropathy  Probably anxiety disorder         History of Present Illness      33yr old male with PMHx of episodic pre-syncopal episodes with  unremarkable extensive work-up, and  Intermittent pharyngeal dysphagia episodes over the last year who presents with new epidoes of what appear to be Laryngospasm vs vocal cord dysfunction,  associated with neck pain and subjective sensation of upper extremity weakness.          Hospital Course     Crescencio Moncada was admitted on 11/25/2018.  The following problems were addressed during his hospitalization:     #Dysphagia/Dysphonia   #Pharyngeal spasm  #Erosive gastropathy   He was kept NPO initially and underwent swallow study with speech therapy with normal findings of muscular evaluation. Bedside Negative inspiratory force completed with respiratory therapy were within normal range. Esophagram revealed findings of Schatzki's ring and concentric indentations along the esophagus suggestive of eosinophilic esophagitis in addition to a small sliding hiatal hernia. ENT was then consulted and his bedside flexible laryngoscopy was normal. ENT did recommend outpatient Speech Language Pathology with Farida Odonnell CCC-SLP (order placed on discharge). To rule out esophageal pathology, GI was consulted for EGD with multiple gastric  antral erosions suspected to be related to tobacco, EtOH and NSAID use. He was recommended to start omeprazole PO BID for 8 weeks. Surgical pathology from biopsies pending at time of discharge. Patient was counseled on importance of alcohol cessation and minimizing NSAID exposure as he has already quit smoking last week. He was strongly encouraged to follow up with SLP to help with his dysphagia/dysphonia. Additionally, he is open to the possibility of exploring therapy options for anxiety with his PCP (counseled on options from regular daily medications to PRN medications to counseling). He was certainly more agreeable to the possibility of his symptoms being related to anxiety at the time of discharge.  His telemetry and oxygenation monitoring during hospitalization was completely normal without any concerning findings.         Significant Results and Procedures        EGD:  Impression:          - Normal esophagus. Biopsied for EOE from mid and distal                        esophagus.                        - Z-line regular, 45 cm from the incisors.                        - Erosive gastropathy. Biopsied for H. pylori                        - Normal duodenal bulb, first portion of the duodenum                        and second portion of the duodenum.   Recommendation:      - Await pathology results.                        - [facility]                        - Use Prilosec (omeprazole) 40 mg PO BID for 8 weeks.                        - No etiology of patients symptoms on this exam.                        - Would consider anxiety/panic attacks as a possible                        cause of symptoms - including globus (rather than                        dysphagia)         Esophogram  Impression:   1. Mild concentric indentations along the entire esophagus may  represent eosinophilic esophagitis. Consider GI consultation for  further evaluation.  2. Schatzki's ring.  3. Small  sliding hiatal hernia.   4. No penetration or aspiration with all consistencies.     Head CT w/o  IMPRESSION: Normal CT scan of the head.         Pending Results     These results will be followed up by GI providers and PCP     Unresulted Labs Ordered in the Past 30 Days of this Admission     Date and Time Order Name Status Description     11/27/2018 1430 Surgical pathology exam In process                 Primary Care Physician       Apryl Correia  [facility]: [address] [place] [address]        Physical Exam     Vital Signs with Ranges  Temp:  [97.6  F (36.4  C)-98.4  F (36.9  C)] 98.4  F (36.9  C)  Pulse:  [84] 84  Heart Rate:  [56-64] 58  Resp:  [8-16] 16  BP: (114-127)/(65-87) 120/87  SpO2:  [95 %-97 %] 95 %  I/O last 3 completed shifts:  In: 680 [P.O.:480; I.V.:200]  Out: 600 [Urine:600]  Vitals : reviewed in Epic. Significant for : stable vitals  Gen: NAD   CV: RRR, no m/r/g  Resp: CTAB, no w/r/r, no respiratory distress  Abd: +BS, soft, NTND, loud sound made with swallowing   Neuro: A&Ox3, no focal motor or sensory defitis  Skin: scaly patches noted on L elbow and R knee; two tattoos noted on LUE and LLE  Ext: no edema        Time Spent on this Encounter       IMargarita, personally saw the patient today and spent less than or equal to 30 minutes discharging this patient.     Pt discussed with Dr. Dai, staff attending, who agrees with above assessment and plan.      Margarita Colon MD  Medicine-Pediatrics, PGY-4         Discharge Disposition      Discharged to home  Condition at discharge: Stable        Consultations This Hospital Stay       SWALLOW EVAL SPEECH PATH AT BEDSIDE IP CONSULT  VASCULAR ACCESS CARE ADULT IP CONSULT  ENT IP CONSULT  GI LUMINAL ADULT IP CONSULT  MEDICATION HISTORY IP PHARMACY CONSULT        Discharge Orders         Speech Therapy Referral      Reason for your hospital stay   Difficulty swallowing      Adult [facility] Follow-up and recommended labs and tests    Follow up with primary care provider, pAryl Correia, within 7 days for hospital follow- up.  No follow up labs or test are needed.      Appointments on [place] and/or [facility] (with [facility] or [facility] provider or service). Call [phone] if you haven't heard regarding these appointments within 7 days of discharge.      Activity   Your activity upon discharge: activity as tolerated      Discharge Instructions   Avoid ibuprofen/advil/naproxen/aspirin type medications, tobacco and alcohol to help your stomach lining heal. It is important to take the omeprazole medication twice a day as well. Please see your primary doctor in a week. It is important to utilize your primary clinic to help guide you to what kind of care you need so that you can avoid another emergency room visit. Most clinics have an after visit call line that you can phone in any concerns and they would help advise you for any symptoms you may be having.      Full Code      Diet   Follow this diet upon discharge: Orders Placed This Encounter     Regular Diet Adult          Discharge Medications            Discharge Medication List as of 11/27/2018  5:59 PM             CONTINUE these medications which have CHANGED     Details   omeprazole (PRILOSEC) 40 MG DR capsule Take 1 capsule (40 mg) by mouth 2 times daily, Disp-60 capsule, R-1, E-Prescribe                 CONTINUE these medications which have NOT CHANGED     Details   ranitidine (ZANTAC) 150 MG capsule Take 150 mg by mouth daily as needed , Historical                STOP taking these medications         Ibuprofen (ADVIL PO) Comments:   Reason for Stopping:                  Problem list and histories reviewed & adjusted, as indicated.  Additional history: as documented    Patient Active Problem List   Diagnosis     Injury of right hand, initial encounter     Pain of right hand     Need for diphtheria-tetanus-pertussis (Tdap) vaccine, adult/adolescent     Epigastric pain     Liver  lesion     Syncope, unspecified syncope type     Spasm     Past Surgical History:   Procedure Laterality Date     COLONOSCOPY WITH CO2 INSUFFLATION N/A 9/11/2017    Procedure: COLONOSCOPY WITH CO2 INSUFFLATION;  EGD, Colonoscopy, Dr Correia referring, BMI 28.11, [facility] ER fax; [phone];  Surgeon: Amber Lowry MD;  Location: [facility] OR     COMBINED ESOPHAGOSCOPY, GASTROSCOPY, DUODENOSCOPY (EGD) WITH CO2 INSUFFLATION N/A 9/11/2017    Procedure: COMBINED ESOPHAGOSCOPY, GASTROSCOPY, DUODENOSCOPY (EGD) WITH CO2 INSUFFLATION;  EGD, Colonoscopy, Dr Correia referring, BMI 28.11, [facility] ER fax; [phone];  Surgeon: Amber Lowry MD;  Location: [facility] OR     ESOPHAGOSCOPY, GASTROSCOPY, DUODENOSCOPY (EGD), COMBINED N/A 9/11/2017    Procedure: COMBINED ESOPHAGOSCOPY, GASTROSCOPY, DUODENOSCOPY (EGD), BIOPSY SINGLE OR MULTIPLE;;  Surgeon: Amber Lowry MD;  Location: [facility] OR     ESOPHAGOSCOPY, GASTROSCOPY, DUODENOSCOPY (EGD), COMBINED N/A 11/27/2018    Procedure: COMBINED ESOPHAGOSCOPY, GASTROSCOPY, DUODENOSCOPY (EGD), BIOPSY SINGLE OR MULTIPLE;  Surgeon: Reji Andujar MD;  Location:  GI       Social History   Substance Use Topics     Smoking status: Former Smoker     Types: Dip, chew, snus or snuff     Quit date: 11/20/2017     Smokeless tobacco: Former User     Types: Chew      Comment: every 3 days     Alcohol use Yes      Comment: 4-5 beers/day, sometimes more on the weekend     Family History   Problem Relation Age of Onset     Myocardial Infarction Paternal Grandfather 46     Genetic Disorder Mother      spinal muscular atrophy gene     Genetic Disorder Sister 0     spinal muscular atrophy     Coronary Artery Disease Paternal Half-Brother          Current Outpatient Prescriptions   Medication Sig Dispense Refill     omeprazole (PRILOSEC) 40 MG DR capsule Take 1 capsule (40 mg) by mouth 2 times daily 60 capsule 1     ranitidine (ZANTAC) 150 MG capsule Take 150 mg by mouth daily as  "needed        triamcinolone (KENALOG) 0.1 % external cream Apply sparingly to affected area twice daily as needed to affected areas 80 g 3     No Known Allergies  BP Readings from Last 3 Encounters:   11/30/18 126/70   11/27/18 120/87   11/25/18 125/80    Wt Readings from Last 3 Encounters:   11/30/18 219 lb 14.4 oz (99.7 kg)   11/27/18 216 lb 9.6 oz (98.2 kg)   11/25/18 217 lb (98.4 kg)                  Labs reviewed in EPIC    ROS:  Constitutional, HEENT, cardiovascular, pulmonary, GI, , musculoskeletal, neuro, skin, endocrine and psych systems are negative, except as otherwise noted.    OBJECTIVE:     /70  Pulse 80  Temp 98.2  F (36.8  C) (Temporal)  Resp 16  Ht 6' 0.28\" (1.836 m)  Wt 219 lb 14.4 oz (99.7 kg)  BMI 29.59 kg/m2  Body mass index is 29.59 kg/(m^2).  GENERAL: alert and no acute distress  EYES: Eyes grossly normal to inspection, PERRL and conjunctivae and sclerae normal  NECK: no adenopathy, no asymmetry, masses, or scars and thyroid normal to palpation  RESP: lungs clear to auscultation - no rales, rhonchi or wheezes  CV: regular rate and rhythm, normal S1 S2, no S3 or S4, no murmur, click or rub  ABDOMEN: soft, nontender, no hepatosplenomegaly, no masses and bowel sounds normal  MS: mild tenderness to palpation over the right upper trapezius and right cervical paraspinal muscles, otherwise extremities normal- no gross deformities noted  SKIN: plaque-like lesions on extensor aspects of elbows and knees; multiple hyperkeratotic lesions on palmar aspect of both hands  NEURO: Normal strength and tone, mentation intact and speech normal  PSYCH: mentation appears normal, affect normal/bright, anxious, judgement and insight intact and appearance well groomed    Diagnostic Test Results:  none     ASSESSMENT/PLAN:     1. Psoriasiform eruption  Referral to derm as he will likely need biopsy to confirm diagnosis of psoriasis. Gave prescription for topical steroid to use but recommended to see derm " first so they can biopsy the lesions.  - DERMATOLOGY REFERRAL  - triamcinolone (KENALOG) 0.1 % external cream; Apply sparingly to affected area twice daily as needed to affected areas  Dispense: 80 g; Refill: 3    2. Eosinophilic esophagitis  EGD results consistent with diagnosis of eosinophilic esophagitis. Referral to GI for consult and treatment. Continue PPI and ranitidine.   - DERMATOLOGY REFERRAL  - GASTROENTEROLOGY ADULT REF CONSULT ONLY    3. Esophageal dysphagia  - GASTROENTEROLOGY ADULT REF CONSULT ONLY    4. Bloated abdomen  - GASTROENTEROLOGY ADULT REF CONSULT ONLY    MAGALIS Ortega [facility]

## 2018-11-30 ENCOUNTER — OFFICE VISIT (OUTPATIENT)
Dept: FAMILY MEDICINE | Facility: OTHER | Age: 33
End: 2018-11-30
Payer: COMMERCIAL

## 2018-11-30 VITALS
RESPIRATION RATE: 16 BRPM | WEIGHT: 219.9 LBS | BODY MASS INDEX: 29.78 KG/M2 | SYSTOLIC BLOOD PRESSURE: 126 MMHG | DIASTOLIC BLOOD PRESSURE: 70 MMHG | HEART RATE: 80 BPM | HEIGHT: 72 IN | TEMPERATURE: 98.2 F

## 2018-11-30 DIAGNOSIS — R13.19 ESOPHAGEAL DYSPHAGIA: ICD-10-CM

## 2018-11-30 DIAGNOSIS — K20.0 EOSINOPHILIC ESOPHAGITIS: ICD-10-CM

## 2018-11-30 DIAGNOSIS — L30.8 PSORIASIFORM ERUPTION: Primary | ICD-10-CM

## 2018-11-30 DIAGNOSIS — R14.0 BLOATED ABDOMEN: ICD-10-CM

## 2018-11-30 PROCEDURE — 99214 OFFICE O/P EST MOD 30 MIN: CPT | Performed by: STUDENT IN AN ORGANIZED HEALTH CARE EDUCATION/TRAINING PROGRAM

## 2018-11-30 RX ORDER — ESCITALOPRAM OXALATE 10 MG/1
10 TABLET ORAL DAILY
Qty: 30 TABLET | Refills: 1 | Status: CANCELLED | OUTPATIENT
Start: 2018-11-30

## 2018-11-30 RX ORDER — TRIAMCINOLONE ACETONIDE 1 MG/G
CREAM TOPICAL
Qty: 80 G | Refills: 3 | Status: ON HOLD | OUTPATIENT
Start: 2018-11-30 | End: 2018-12-06

## 2018-11-30 ASSESSMENT — ANXIETY QUESTIONNAIRES
1. FEELING NERVOUS, ANXIOUS, OR ON EDGE: SEVERAL DAYS
7. FEELING AFRAID AS IF SOMETHING AWFUL MIGHT HAPPEN: NEARLY EVERY DAY
5. BEING SO RESTLESS THAT IT IS HARD TO SIT STILL: SEVERAL DAYS
IF YOU CHECKED OFF ANY PROBLEMS ON THIS QUESTIONNAIRE, HOW DIFFICULT HAVE THESE PROBLEMS MADE IT FOR YOU TO DO YOUR WORK, TAKE CARE OF THINGS AT HOME, OR GET ALONG WITH OTHER PEOPLE: EXTREMELY DIFFICULT
6. BECOMING EASILY ANNOYED OR IRRITABLE: NEARLY EVERY DAY
3. WORRYING TOO MUCH ABOUT DIFFERENT THINGS: NOT AT ALL

## 2018-11-30 ASSESSMENT — PATIENT HEALTH QUESTIONNAIRE - PHQ9: 5. POOR APPETITE OR OVEREATING: NEARLY EVERY DAY

## 2018-11-30 ASSESSMENT — PAIN SCALES - GENERAL: PAINLEVEL: SEVERE PAIN (6)

## 2018-11-30 NOTE — PATIENT INSTRUCTIONS
Triamcinolone - apply twice daily as needed after seeing dermatology.    Gastroenterology and dermatology referrals - call to schedule these appointments.    STELLA MaddenC

## 2018-11-30 NOTE — MR AVS SNAPSHOT
After Visit Summary   11/30/2018    Crescencio Moncada    MRN: [id number]           Patient Information     Date Of Birth          1985        Visit Information        Provider Department      11/30/2018 11:00 AM Apryl Correia APRN [facility]        Today's Diagnoses     Psoriasiform eruption    -  1    Eosinophilic esophagitis        Esophageal dysphagia        Bloated abdomen          Care Instructions    Triamcinolone - apply twice daily as needed after seeing dermatology.    Gastroenterology and dermatology referrals - call to schedule these appointments.    Apryl Correia, DAIJA-C            Follow-ups after your visit        Additional Services     DERMATOLOGY REFERRAL       Your provider has referred you to: [facility]: [facility] [place] [phone]   [email or web address]  [facility] - [facility] Silvestre [phone]   [email or web address]  [place] [phone]   [email or web address]    Please be aware that coverage of these services is subject to the terms and limitations of your health insurance plan.  Call member services at your health plan with any benefit or coverage questions.      Please bring the following with you to your appointment:    (1) Any X-Rays, CTs or MRIs which have been performed.  Contact the facility where they were done to arrange for  prior to your scheduled appointment.    (2) List of current medications  (3) This referral request   (4) Any documents/labs given to you for this referral            GASTROENTEROLOGY ADULT REF CONSULT ONLY       [facility], [facility]: [phone] and [place] [facility] [phone]      Please be aware that coverage of these services is subject to the terms and limitations of your health insurance plan.  Call member services at your health plan with any benefit or  "coverage questions.  Any procedures must be performed at a [place] facility OR coordinated by your clinic's referral office.    Please bring the following with you to your appointment:    (1) Any X-Rays, CTs or MRIs which have been performed.  Contact the facility where they were done to arrange for  prior to your scheduled appointment.    (2) List of current medications   (3) This referral request   (4) Any documents/labs given to you for this referral                  Who to contact     If you have questions or need follow up information about today's clinic visit or your schedule please contact [facility] directly at [phone].  Normal or non-critical lab and imaging results will be communicated to you by MyChart, letter or phone within 4 business days after the clinic has received the results. If you do not hear from us within 7 days, please contact the clinic through MyChart or phone. If you have a critical or abnormal lab result, we will notify you by phone as soon as possible.  Submit refill requests through [employer] ([employer]) or call your pharmacy and they will forward the refill request to us. Please allow 3 business days for your refill to be completed.          Additional Information About Your Visit        [employer] Information     [employer] ([employer]) lets you send messages to your doctor, view your test results, renew your prescriptions, schedule appointments and more. To sign up, go to [email or web address] . Click on \"Log in\" on the left side of the screen, which will take you to the Welcome page. Then click on \"Sign up Now\" on the right side of the page.     You will be asked to enter the access code listed below, as well as some personal information. Please follow the directions to create your username and password.     Your access code is: [email or web address]  Expires: 2019  8:16 PM     Your access code will  in 90 days. If you need help or a new code, please call your [facility] or " "[phone].        Care EveryWhere ID     This is your Care EveryWhere ID. This could be used by other organizations to access your [place] medical records  [id number]        Your Vitals Were     Pulse Temperature Respirations Height BMI (Body Mass Index)       80 98.2  F (36.8  C) (Temporal) 16 6' 0.28\" (1.836 m) 29.59 kg/m2        Blood Pressure from Last 3 Encounters:   11/30/18 126/70   11/27/18 120/87   11/25/18 125/80    Weight from Last 3 Encounters:   11/30/18 219 lb 14.4 oz (99.7 kg)   11/27/18 216 lb 9.6 oz (98.2 kg)   11/25/18 217 lb (98.4 kg)              We Performed the Following     DERMATOLOGY REFERRAL     GASTROENTEROLOGY ADULT REF CONSULT ONLY          Today's Medication Changes          These changes are accurate as of 11/30/18 11:35 AM.  If you have any questions, ask your nurse or doctor.               Start taking these medicines.        Dose/Directions    triamcinolone 0.1 % external cream   Commonly known as:  KENALOG   Used for:  Psoriasiform eruption   Started by:  Apryl Correia APRN CNP        Apply sparingly to affected area twice daily as needed to affected areas   Quantity:  80 g   Refills:  3            Where to get your medicines      These medications were sent to [facility] [address] [place] [address] [address] [address]     Phone:  [phone]     triamcinolone 0.1 % external cream                Primary Care Provider Office Phone # Fax #    MAGALIS Cook [phone] [address] [place] [address]        Equal Access to Services     ELOISA PECK AH: Hadii dionte Nicholas, waaxda luqadaha, qaybta kaalmada adeegyada, patricia amor. So [facility] [phone].    ATENCIÓN: Si habla español, tiene a mcneal disposición servicios gratuitos de asistencia lingüística. Llame al [phone].    We comply with applicable federal civil rights laws and [place] " laws. We do not discriminate on the basis of race, color, national origin, age, disability, sex, sexual orientation, or gender identity.            Thank you!     Thank you for choosing [facility]  for your care. Our goal is always to provide you with excellent care. Hearing back from our patients is one way we can continue to improve our services. Please take a few minutes to complete the written survey that you may receive in the mail after your visit with us. Thank you!             Your Updated Medication List - Protect others around you: Learn how to safely use, store and throw away your medicines at www.disposemymeds.org.          This list is accurate as of 11/30/18 11:35 AM.  Always use your most recent med list.                   Brand Name Dispense Instructions for use Diagnosis    omeprazole 40 MG DR capsule    priLOSEC    60 capsule    Take 1 capsule (40 mg) by mouth 2 times daily    Acute gastric erosion       ranitidine 150 MG capsule    ZANTAC     Take 150 mg by mouth daily as needed        triamcinolone 0.1 % external cream    KENALOG    80 g    Apply sparingly to affected area twice daily as needed to affected areas    Psoriasiform eruption

## 2018-12-04 ENCOUNTER — TELEPHONE (OUTPATIENT)
Dept: GASTROENTEROLOGY | Facility: CLINIC | Age: 33
End: 2018-12-04

## 2018-12-04 DIAGNOSIS — K21.00 GASTROESOPHAGEAL REFLUX DISEASE WITH ESOPHAGITIS: Primary | ICD-10-CM

## 2018-12-04 DIAGNOSIS — R13.10 DYSPHAGIA, UNSPECIFIED TYPE: Primary | ICD-10-CM

## 2018-12-04 RX ORDER — PANTOPRAZOLE SODIUM 40 MG/1
40 TABLET, DELAYED RELEASE ORAL DAILY
Qty: 90 TABLET | Refills: 1 | Status: SHIPPED | OUTPATIENT
Start: 2018-12-04 | End: 2019-02-11

## 2018-12-04 NOTE — TELEPHONE ENCOUNTER
Called to discuss path results.   GERD vs EOE.     Plan to treat with PPI for 8 weeks, repeat EGD and then see gen GI in office.     He is having some stomach cramping on Prilosec. Will try Pantoprazole 40mg once a day.      Letter also sent.

## 2018-12-05 ENCOUNTER — TELEPHONE (OUTPATIENT)
Dept: GASTROENTEROLOGY | Facility: CLINIC | Age: 33
End: 2018-12-05

## 2018-12-06 ENCOUNTER — TELEPHONE (OUTPATIENT)
Dept: GASTROENTEROLOGY | Facility: CLINIC | Age: 33
End: 2018-12-06

## 2018-12-06 ENCOUNTER — APPOINTMENT (OUTPATIENT)
Dept: MRI IMAGING | Facility: CLINIC | Age: 33
End: 2018-12-06
Attending: EMERGENCY MEDICINE
Payer: COMMERCIAL

## 2018-12-06 ENCOUNTER — TELEPHONE (OUTPATIENT)
Dept: FAMILY MEDICINE | Facility: OTHER | Age: 33
End: 2018-12-06

## 2018-12-06 ENCOUNTER — HOSPITAL ENCOUNTER (EMERGENCY)
Facility: CLINIC | Age: 33
Discharge: ANOTHER HOSPITAL | End: 2018-12-06
Attending: EMERGENCY MEDICINE | Admitting: EMERGENCY MEDICINE
Payer: COMMERCIAL

## 2018-12-06 ENCOUNTER — HOSPITAL ENCOUNTER (INPATIENT)
Facility: CLINIC | Age: 33
LOS: 2 days | Discharge: HOME | DRG: 057 | End: 2018-12-08
Attending: INTERNAL MEDICINE | Admitting: INTERNAL MEDICINE
Payer: COMMERCIAL

## 2018-12-06 VITALS
BODY MASS INDEX: 29.07 KG/M2 | RESPIRATION RATE: 16 BRPM | WEIGHT: 216 LBS | TEMPERATURE: 97.8 F | DIASTOLIC BLOOD PRESSURE: 80 MMHG | HEART RATE: 72 BPM | OXYGEN SATURATION: 96 % | SYSTOLIC BLOOD PRESSURE: 128 MMHG

## 2018-12-06 DIAGNOSIS — R13.10 DYSPHAGIA, UNSPECIFIED TYPE: ICD-10-CM

## 2018-12-06 DIAGNOSIS — G61.0 GUILLAIN BARRÉ SYNDROME (H): ICD-10-CM

## 2018-12-06 DIAGNOSIS — R20.2 NUMBNESS AND TINGLING OF BOTH FEET: Primary | ICD-10-CM

## 2018-12-06 DIAGNOSIS — R20.0 NUMBNESS AND TINGLING OF BOTH FEET: Primary | ICD-10-CM

## 2018-12-06 PROBLEM — R29.898 WEAKNESS OF BOTH LEGS: Status: ACTIVE | Noted: 2018-12-06

## 2018-12-06 LAB
ALBUMIN SERPL-MCNC: 3.6 G/DL (ref 3.4–5)
ALP SERPL-CCNC: 54 U/L (ref 40–150)
ALT SERPL W P-5'-P-CCNC: 29 U/L (ref 0–70)
ANION GAP SERPL CALCULATED.3IONS-SCNC: 7 MMOL/L (ref 3–14)
APPEARANCE CSF: CLEAR
AST SERPL W P-5'-P-CCNC: 16 U/L (ref 0–45)
BASOPHILS # BLD AUTO: 0.1 10E9/L (ref 0–0.2)
BASOPHILS NFR BLD AUTO: 1.2 %
BILIRUB SERPL-MCNC: 0.4 MG/DL (ref 0.2–1.3)
BUN SERPL-MCNC: 13 MG/DL (ref 7–30)
CALCIUM SERPL-MCNC: 8.7 MG/DL (ref 8.5–10.1)
CHLORIDE SERPL-SCNC: 108 MMOL/L (ref 94–109)
CK SERPL-CCNC: 57 U/L (ref 30–300)
CO2 SERPL-SCNC: 26 MMOL/L (ref 20–32)
COLOR CSF: COLORLESS
CREAT SERPL-MCNC: 0.9 MG/DL (ref 0.66–1.25)
CRP SERPL-MCNC: 46.2 MG/L (ref 0–8)
DIFFERENTIAL METHOD BLD: NORMAL
EOSINOPHIL NFR BLD AUTO: 3.3 %
ERYTHROCYTE [DISTWIDTH] IN BLOOD BY AUTOMATED COUNT: 12 % (ref 10–15)
ERYTHROCYTE [SEDIMENTATION RATE] IN BLOOD BY WESTERGREN METHOD: 16 MM/H (ref 0–15)
FOLATE SERPL-MCNC: NORMAL NG/ML
GFR SERPL CREATININE-BSD FRML MDRD: >90 ML/MIN/1.7M2
GLUCOSE CSF-MCNC: 57 MG/DL (ref 40–70)
GLUCOSE SERPL-MCNC: 102 MG/DL (ref 70–99)
GRAM STN SPEC: NORMAL
HCT VFR BLD AUTO: 43.6 % (ref 40–53)
HGB BLD-MCNC: 15.1 G/DL (ref 13.3–17.7)
IMM GRANULOCYTES # BLD: 0 10E9/L (ref 0–0.4)
IMM GRANULOCYTES NFR BLD: 0.2 %
LYMPHOCYTES # BLD AUTO: 1.9 10E9/L (ref 0.8–5.3)
LYMPHOCYTES NFR BLD AUTO: 38.8 %
MCH RBC QN AUTO: 30.3 PG (ref 26.5–33)
MCHC RBC AUTO-ENTMCNC: 34.6 G/DL (ref 31.5–36.5)
MCV RBC AUTO: 87 FL (ref 78–100)
MONOCYTES # BLD AUTO: 0.6 10E9/L (ref 0–1.3)
MONOCYTES NFR BLD AUTO: 11.2 %
NEUTROPHILS # BLD AUTO: 2.2 10E9/L (ref 1.6–8.3)
NEUTROPHILS NFR BLD AUTO: 45.3 %
NRBC # BLD AUTO: 0 10*3/UL
NRBC BLD AUTO-RTO: 0 /100
PLATELET # BLD AUTO: 280 10E9/L (ref 150–450)
POTASSIUM SERPL-SCNC: 3.9 MMOL/L (ref 3.4–5.3)
PROT CSF-MCNC: 42 MG/DL (ref 15–60)
PROT SERPL-MCNC: 7.5 G/DL (ref 6.8–8.8)
RBC # BLD AUTO: 4.99 10E12/L (ref 4.4–5.9)
RBC # CSF MANUAL: 0 /UL (ref 0–2)
SODIUM SERPL-SCNC: 141 MMOL/L (ref 133–144)
SPECIMEN SOURCE: NORMAL
TSH SERPL DL<=0.005 MIU/L-ACNC: 1.81 MU/L (ref 0.4–4)
TUBE # CSF: 4 #
WBC # BLD AUTO: 4.9 10E9/L (ref 4–11)
WBC # CSF MANUAL: 0 /UL (ref 0–5)

## 2018-12-06 PROCEDURE — 12000000 ZZH R&B MED SURG/OB

## 2018-12-06 PROCEDURE — 62270 DX LMBR SPI PNXR: CPT | Performed by: EMERGENCY MEDICINE

## 2018-12-06 PROCEDURE — 84443 ASSAY THYROID STIM HORMONE: CPT | Performed by: EMERGENCY MEDICINE

## 2018-12-06 PROCEDURE — 96360 HYDRATION IV INFUSION INIT: CPT

## 2018-12-06 PROCEDURE — 96361 HYDRATE IV INFUSION ADD-ON: CPT

## 2018-12-06 PROCEDURE — 82550 ASSAY OF CK (CPK): CPT | Performed by: EMERGENCY MEDICINE

## 2018-12-06 PROCEDURE — 80053 COMPREHEN METABOLIC PANEL: CPT | Performed by: EMERGENCY MEDICINE

## 2018-12-06 PROCEDURE — 86617 LYME DISEASE ANTIBODY: CPT | Performed by: EMERGENCY MEDICINE

## 2018-12-06 PROCEDURE — 99285 EMERGENCY DEPT VISIT HI MDM: CPT | Mod: 25 | Performed by: EMERGENCY MEDICINE

## 2018-12-06 PROCEDURE — 25000128 H RX IP 250 OP 636: Performed by: EMERGENCY MEDICINE

## 2018-12-06 PROCEDURE — 85025 COMPLETE CBC W/AUTO DIFF WBC: CPT | Performed by: EMERGENCY MEDICINE

## 2018-12-06 PROCEDURE — 89050 BODY FLUID CELL COUNT: CPT | Performed by: EMERGENCY MEDICINE

## 2018-12-06 PROCEDURE — 86790 VIRUS ANTIBODY NOS: CPT | Performed by: PSYCHIATRY & NEUROLOGY

## 2018-12-06 PROCEDURE — 86140 C-REACTIVE PROTEIN: CPT | Performed by: EMERGENCY MEDICINE

## 2018-12-06 PROCEDURE — 25500064 ZZH RX 255 OP 636: Performed by: RADIOLOGY

## 2018-12-06 PROCEDURE — 85652 RBC SED RATE AUTOMATED: CPT | Performed by: EMERGENCY MEDICINE

## 2018-12-06 PROCEDURE — 25000132 ZZH RX MED GY IP 250 OP 250 PS 637: Performed by: INTERNAL MEDICINE

## 2018-12-06 PROCEDURE — 87529 HSV DNA AMP PROBE: CPT | Performed by: EMERGENCY MEDICINE

## 2018-12-06 PROCEDURE — A9585 GADOBUTROL INJECTION: HCPCS | Performed by: RADIOLOGY

## 2018-12-06 PROCEDURE — 87070 CULTURE OTHR SPECIMN AEROBIC: CPT | Performed by: EMERGENCY MEDICINE

## 2018-12-06 PROCEDURE — 82746 ASSAY OF FOLIC ACID SERUM: CPT | Performed by: EMERGENCY MEDICINE

## 2018-12-06 PROCEDURE — 84157 ASSAY OF PROTEIN OTHER: CPT | Performed by: EMERGENCY MEDICINE

## 2018-12-06 PROCEDURE — 82945 GLUCOSE OTHER FLUID: CPT | Performed by: EMERGENCY MEDICINE

## 2018-12-06 PROCEDURE — 87205 SMEAR GRAM STAIN: CPT | Performed by: EMERGENCY MEDICINE

## 2018-12-06 PROCEDURE — 70553 MRI BRAIN STEM W/O & W/DYE: CPT

## 2018-12-06 PROCEDURE — 25000128 H RX IP 250 OP 636: Performed by: INTERNAL MEDICINE

## 2018-12-06 PROCEDURE — 87015 SPECIMEN INFECT AGNT CONCNTJ: CPT | Performed by: EMERGENCY MEDICINE

## 2018-12-06 PROCEDURE — 62270 DX LMBR SPI PNXR: CPT | Mod: Z6 | Performed by: EMERGENCY MEDICINE

## 2018-12-06 PROCEDURE — 99223 1ST HOSP IP/OBS HIGH 75: CPT | Mod: AI | Performed by: INTERNAL MEDICINE

## 2018-12-06 RX ORDER — ACETAMINOPHEN 325 MG/1
650 TABLET ORAL EVERY 4 HOURS PRN
Status: DISCONTINUED | OUTPATIENT
Start: 2018-12-06 | End: 2018-12-08 | Stop reason: HOSPADM

## 2018-12-06 RX ORDER — ONDANSETRON 4 MG/1
4 TABLET, ORALLY DISINTEGRATING ORAL EVERY 6 HOURS PRN
Status: DISCONTINUED | OUTPATIENT
Start: 2018-12-06 | End: 2018-12-08 | Stop reason: HOSPADM

## 2018-12-06 RX ORDER — GADOBUTROL 604.72 MG/ML
10 INJECTION INTRAVENOUS ONCE
Status: COMPLETED | OUTPATIENT
Start: 2018-12-06 | End: 2018-12-06

## 2018-12-06 RX ORDER — ONDANSETRON 2 MG/ML
4 INJECTION INTRAMUSCULAR; INTRAVENOUS EVERY 6 HOURS PRN
Status: DISCONTINUED | OUTPATIENT
Start: 2018-12-06 | End: 2018-12-08 | Stop reason: HOSPADM

## 2018-12-06 RX ORDER — SODIUM CHLORIDE 9 MG/ML
INJECTION, SOLUTION INTRAVENOUS CONTINUOUS
Status: DISCONTINUED | OUTPATIENT
Start: 2018-12-06 | End: 2018-12-07

## 2018-12-06 RX ORDER — AMOXICILLIN 250 MG
2 CAPSULE ORAL 2 TIMES DAILY PRN
Status: DISCONTINUED | OUTPATIENT
Start: 2018-12-06 | End: 2018-12-06

## 2018-12-06 RX ORDER — SODIUM CHLORIDE 9 MG/ML
1000 INJECTION, SOLUTION INTRAVENOUS CONTINUOUS
Status: DISCONTINUED | OUTPATIENT
Start: 2018-12-06 | End: 2018-12-06 | Stop reason: HOSPADM

## 2018-12-06 RX ORDER — AMOXICILLIN 250 MG
1 CAPSULE ORAL 2 TIMES DAILY PRN
Status: DISCONTINUED | OUTPATIENT
Start: 2018-12-06 | End: 2018-12-06

## 2018-12-06 RX ORDER — POLYETHYLENE GLYCOL 3350 17 G/17G
17 POWDER, FOR SOLUTION ORAL 2 TIMES DAILY PRN
Status: DISCONTINUED | OUTPATIENT
Start: 2018-12-06 | End: 2018-12-08 | Stop reason: HOSPADM

## 2018-12-06 RX ORDER — NALOXONE HYDROCHLORIDE 0.4 MG/ML
.1-.4 INJECTION, SOLUTION INTRAMUSCULAR; INTRAVENOUS; SUBCUTANEOUS
Status: DISCONTINUED | OUTPATIENT
Start: 2018-12-06 | End: 2018-12-08 | Stop reason: HOSPADM

## 2018-12-06 RX ORDER — PANTOPRAZOLE SODIUM 40 MG/1
40 TABLET, DELAYED RELEASE ORAL DAILY
Status: DISCONTINUED | OUTPATIENT
Start: 2018-12-07 | End: 2018-12-08 | Stop reason: HOSPADM

## 2018-12-06 RX ORDER — AMOXICILLIN 250 MG
1 CAPSULE ORAL 2 TIMES DAILY
Status: DISCONTINUED | OUTPATIENT
Start: 2018-12-06 | End: 2018-12-08 | Stop reason: HOSPADM

## 2018-12-06 RX ORDER — PENICILLIN V POTASSIUM 500 MG/1
500 TABLET, FILM COATED ORAL 2 TIMES DAILY
Status: DISCONTINUED | OUTPATIENT
Start: 2018-12-06 | End: 2018-12-08 | Stop reason: HOSPADM

## 2018-12-06 RX ADMIN — GADOBUTROL 10 ML: 604.72 INJECTION INTRAVENOUS at 11:43

## 2018-12-06 RX ADMIN — SODIUM CHLORIDE 1000 ML: 9 INJECTION, SOLUTION INTRAVENOUS at 13:23

## 2018-12-06 RX ADMIN — PENICILLIN V POTASSIUM 500 MG: 500 TABLET, FILM COATED ORAL at 23:39

## 2018-12-06 RX ADMIN — SODIUM CHLORIDE 1000 ML: 9 INJECTION, SOLUTION INTRAVENOUS at 12:33

## 2018-12-06 RX ADMIN — SODIUM CHLORIDE 1000 ML: 9 INJECTION, SOLUTION INTRAVENOUS at 15:22

## 2018-12-06 RX ADMIN — SODIUM CHLORIDE: 9 INJECTION, SOLUTION INTRAVENOUS at 20:37

## 2018-12-06 ASSESSMENT — ACTIVITIES OF DAILY LIVING (ADL)
TOILETING: 0-->INDEPENDENT
BATHING: 0-->INDEPENDENT
ADLS_ACUITY_SCORE: 10
TRANSFERRING: 0-->INDEPENDENT
DRESS: 0-->INDEPENDENT
RETIRED_EATING: 0-->INDEPENDENT
FALL_HISTORY_WITHIN_LAST_SIX_MONTHS: NO
SWALLOWING: 2-->DIFFICULTY SWALLOWING LIQUIDS/FOODS
AMBULATION: 0-->INDEPENDENT
RETIRED_COMMUNICATION: 0-->UNDERSTANDS/COMMUNICATES WITHOUT DIFFICULTY
COGNITION: 0 - NO COGNITION ISSUES REPORTED

## 2018-12-06 ASSESSMENT — ENCOUNTER SYMPTOMS
NUMBNESS: 1
WEAKNESS: 1
TROUBLE SWALLOWING: 1

## 2018-12-06 NOTE — ED TRIAGE NOTES
Pt started having numbness in left hand two days ago, now progressed to numbness in both hands, feet and leg weakness

## 2018-12-06 NOTE — TELEPHONE ENCOUNTER
Crescencio Moncada is a 33 year old male who calls with numbness.    NURSING ASSESSMENT:  Description:  Numbness and tingling.  Started with left hand:  Shook off and went away for little while.  Then would go numb again.  Then notices that both feet had pins and needles.  Was monitoring it yesterday.   Onset/duration:  Started yesterday  Precip. factors:  Has been on penicillin recently for strep.  Associated symptoms:  This am feet are worse that its hard to stand more then 5 mins.      Allergies: No Known Allergies    MEDICATIONS:   Taking medication(s) as prescribed? Yes  Taking over the counter medication(s?) Yes  Any medication side effects? No significant side effects    Any barriers to taking medication(s) as prescribed?  No  Medication(s) improving/managing symptoms?  No  Medication reconciliation completed: Yes      NURSING PLAN: Nursing advice to patient needs to be seen and evaluated in ED    RECOMMENDED DISPOSITION:  To ED, another person to drive -   Will comply with recommendation: Yes  If further questions/concerns or if symptoms do not improve, worsen or new symptoms develop, call your PCP or [place] [facility] as soon as possible.      Guideline used:  Telephone Triage Protocols for Nurses, Fifth Edition, Mayra Molina, DEEPAK, BSN

## 2018-12-06 NOTE — TELEPHONE ENCOUNTER
Reason for call:  Patient reporting a symptom    Symptom or request: numbness in feet fingers and lips     Duration (how long have symptoms been present): 2 days     Have you been treated for this before? No    Additional comments: pt states has numbness in feet, fingers and lips for 2 days     Phone Number patient can be reached at:  Home number on file [phone] (home)    Best Time:  aNY    Can we leave a detailed message on this number:  YES    Call taken on 12/6/2018 at 9:56 AM by Maryjane Perdue

## 2018-12-06 NOTE — ED PROVIDER NOTES
History     Chief Complaint   Patient presents with     Numbness     The history is provided by the patient.     Crescencio Moncada is a 33 year old male who presents to the ED for evaluation of left hand numbness 2 days ago that is now progressed to numbness in both hands, feet, and legs and is associated with bilateral leg weakness.  Patient works as a  and reports that this morning he was unable to get up off the floor.  He is quite concerned because he developed numbness in the toes that now spread to the feet, ankles, legs, and now is above the knees and is associated with bilateral weakness.  His symptoms actually started nearly 14 days ago when he was on a [place] cruise traveling from [place] to the [facility] and ultimately ending up in [place].  He stated that 14 days ago he started having problems with dysphasia and was having painful swallowing with solids.  He was actually seen and evaluated at the [facility] last week for this and thought to have eosinophilic esophagitis with ulceration in the esophagus.  Since then he has developed this initially left hand numbness and now progressing to bilateral hands and legs.  Denies any fever, chills, cough or shortness of breath, nausea, vomiting, or diarrhea.      Problem List:    Patient Active Problem List    Diagnosis Date Noted     Eosinophilic esophagitis 11/30/2018     Priority: Medium     Esophageal dysphagia 11/30/2018     Priority: Medium     Spasm 11/25/2018     Priority: Medium     Epigastric pain 08/16/2017     Priority: Medium     Liver lesion 08/16/2017     Priority: Medium     Syncope, unspecified syncope type 08/16/2017     Priority: Medium     Injury of right hand, initial encounter 01/09/2017     Priority: Medium     Pain of right hand 01/09/2017     Priority: Medium     Need for diphtheria-tetanus-pertussis (Tdap) vaccine, adult/adolescent 01/09/2017     Priority: Medium        Past Medical History:    No past medical history  on file.    Past Surgical History:    Past Surgical History:   Procedure Laterality Date     COLONOSCOPY WITH CO2 INSUFFLATION N/A 9/11/2017    Procedure: COLONOSCOPY WITH CO2 INSUFFLATION;  EGD, Colonoscopy, Dr Correia referring, BMI 28.11, [facility] fax; [phone];  Surgeon: Amber Lowry MD;  Location: [facility] OR     COMBINED ESOPHAGOSCOPY, GASTROSCOPY, DUODENOSCOPY (EGD) WITH CO2 INSUFFLATION N/A 9/11/2017    Procedure: COMBINED ESOPHAGOSCOPY, GASTROSCOPY, DUODENOSCOPY (EGD) WITH CO2 INSUFFLATION;  EGD, Colonoscopy, Dr Correia referring, BMI 28.11, [facility] fax; [phone];  Surgeon: Amber Lowry MD;  Location: [facility] OR     ESOPHAGOSCOPY, GASTROSCOPY, DUODENOSCOPY (EGD), COMBINED N/A 9/11/2017    Procedure: COMBINED ESOPHAGOSCOPY, GASTROSCOPY, DUODENOSCOPY (EGD), BIOPSY SINGLE OR MULTIPLE;;  Surgeon: Amber Lowry MD;  Location: [facility] OR     ESOPHAGOSCOPY, GASTROSCOPY, DUODENOSCOPY (EGD), COMBINED N/A 11/27/2018    Procedure: COMBINED ESOPHAGOSCOPY, GASTROSCOPY, DUODENOSCOPY (EGD), BIOPSY SINGLE OR MULTIPLE;  Surgeon: Reji Andujar MD;  Location:  GI       Family History:    Family History   Problem Relation Age of Onset     Myocardial Infarction Paternal Grandfather 46     Genetic Disorder Mother      spinal muscular atrophy gene     Genetic Disorder Sister 0     spinal muscular atrophy     Coronary Artery Disease Paternal Half-Brother        Social History:  Marital Status:   [2]  Social History   Substance Use Topics     Smoking status: Former Smoker     Types: Dip, chew, snus or snuff     Quit date: 11/20/2017     Smokeless tobacco: Former User     Types: Chew      Comment: every 3 days     Alcohol use Yes      Comment: 4-5 beers/day, sometimes more on the weekend        Medications:      pantoprazole (PROTONIX) 40 MG EC tablet   ranitidine (ZANTAC) 150 MG capsule   triamcinolone (KENALOG) 0.1 % external cream         Review of Systems   HENT: Positive for trouble  swallowing.    Neurological: Positive for weakness (Bilateral lower extremities) and numbness (Bilateral hands, forearms, feet, ankles, legs, thighs).   All other systems reviewed and are negative.      Physical Exam   BP: (!) 134/93  Pulse: 69  Temp: 97.8  F (36.6  C)  Resp: 16  Weight: 98 kg (216 lb)  SpO2: 97 %      Physical Exam   Constitutional: He is oriented to person, place, and time. He appears well-developed. No distress.   HENT:   Head: Normocephalic and atraumatic.   Mouth/Throat: Oropharynx is clear and moist.   Eyes: EOM are normal. Pupils are equal, round, and reactive to light.   Neck: Normal range of motion. Neck supple.   Cardiovascular: Normal rate, normal heart sounds and intact distal pulses.    Pulmonary/Chest: Effort normal and breath sounds normal.   Abdominal: Soft. Bowel sounds are normal.   Musculoskeletal: Normal range of motion. He exhibits no edema or tenderness.   Neurological: He is alert and oriented to person, place, and time. A sensory deficit (Numbness in both hands and both lower extremities from the knees down.) is present. No cranial nerve deficit. He exhibits abnormal muscle tone (Weakness with bilateral hand , foot flexion and extension, knee flexion and extension,). GCS eye subscore is 4. GCS verbal subscore is 5. GCS motor subscore is 6. He displays no Babinski's sign on the right side. He displays no Babinski's sign on the left side.   Reflex Scores:       Tricep reflexes are 2+ on the right side and 2+ on the left side.       Bicep reflexes are 2+ on the right side and 2+ on the left side.       Brachioradialis reflexes are 2+ on the right side and 2+ on the left side.       Patellar reflexes are 0 on the right side and 0 on the left side.       Achilles reflexes are 0 on the right side and 0 on the left side.  Skin: Skin is warm. Rash (Psoriatic type rash on the hands) noted.   Psychiatric: His speech is normal and behavior is normal. Judgment and thought content  normal. His mood appears anxious. Cognition and memory are normal.   Nursing note and vitals reviewed.      ED Course     ED Course     Procedures          [facility] Procedure Note          Lumbar Puncture:      Time: 5:01 PM  Performed by: Frederick Mcnulty  Authorized by: Frederick Mcnulty    Indications: abnormal neurologic exam    Consent given by: Patient who states understanding of the procedure being performed after discussing the risks, benefits and alternatives.    Prior to the start of the procedure and with procedural staff participation, I verbally confirmed the patient s identity using two indicators, relevant allergies, that the procedure was appropriate and matched the consent or emergent situation, and that the correct equipment/implants were available. Immediately prior to starting the procedure I conducted the Time Out with the procedural staff and re-confirmed the patient s name, procedure, and site/side. (The Joint Commission universal protocol was followed.) Yes    Under sterile conditions the patient was positioned R Lateral decubitus with knees drawn up. Betadine solution and sterile drapes were utilized.  Local anesthetic at the site: 2 ml of lidocaine 1% without epinephrine from the LP tray  A 21 G  spinal needle was inserted at the L 3-4 interspace.  Opening Pressure 14.8 cm H2O pressure.  A total of 14mL of clear and colorless spinal fluid was obtained and sent to the laboratory.   After the needle was removed, a bandaid and pressure were applied and the patient was instructed to stay horizontal until the results were back.    Complications:  None    Patient tolerance: Patient tolerated the procedure well with no immediate complications.           Results for orders placed or performed during the hospital encounter of 12/06/18 (from the past 24 hour(s))   CBC with platelets differential   Result Value Ref Range    WBC 4.9 4.0 - 11.0 10e9/L    RBC Count 4.99 4.4 - 5.9  10e12/L    Hemoglobin 15.1 13.3 - 17.7 g/dL    Hematocrit 43.6 40.0 - 53.0 %    MCV 87 78 - 100 fl    MCH 30.3 26.5 - 33.0 pg    MCHC 34.6 31.5 - 36.5 g/dL    RDW 12.0 10.0 - 15.0 %    Platelet Count 280 150 - 450 10e9/L    Diff Method Automated Method     % Neutrophils 45.3 %    % Lymphocytes 38.8 %    % Monocytes 11.2 %    % Eosinophils 3.3 %    % Basophils 1.2 %    % Immature Granulocytes 0.2 %    Nucleated RBCs 0 0 /100    Absolute Neutrophil 2.2 1.6 - 8.3 10e9/L    Absolute Lymphocytes 1.9 0.8 - 5.3 10e9/L    Absolute Monocytes 0.6 0.0 - 1.3 10e9/L    Absolute Basophils 0.1 0.0 - 0.2 10e9/L    Abs Immature Granulocytes 0.0 0 - 0.4 10e9/L    Absolute Nucleated RBC 0.0    Comprehensive metabolic panel   Result Value Ref Range    Sodium 141 133 - 144 mmol/L    Potassium 3.9 3.4 - 5.3 mmol/L    Chloride 108 94 - 109 mmol/L    Carbon Dioxide 26 20 - 32 mmol/L    Anion Gap 7 3 - 14 mmol/L    Glucose 102 (H) 70 - 99 mg/dL    Urea Nitrogen 13 7 - 30 mg/dL    Creatinine 0.90 0.66 - 1.25 mg/dL    GFR Estimate >90 >60 mL/min/1.7m2    GFR Estimate If Black >90 >60 mL/min/1.7m2    Calcium 8.7 8.5 - 10.1 mg/dL    Bilirubin Total 0.4 0.2 - 1.3 mg/dL    Albumin 3.6 3.4 - 5.0 g/dL    Protein Total 7.5 6.8 - 8.8 g/dL    Alkaline Phosphatase 54 40 - 150 U/L    ALT 29 0 - 70 U/L    AST 16 0 - 45 U/L   Erythrocyte sedimentation rate auto   Result Value Ref Range    Sed Rate 16 (H) 0 - 15 mm/h   CRP inflammation   Result Value Ref Range    CRP Inflammation 46.2 (H) 0.0 - 8.0 mg/L   TSH with free T4 reflex   Result Value Ref Range    TSH 1.81 0.40 - 4.00 mU/L   CK total   Result Value Ref Range    CK Total 57 30 - 300 U/L   MR Brain w/o & w Contrast    Narrative    MRI OF THE BRAIN WITHOUT AND WITH CONTRAST December 6, 2018 12:01 PM     HISTORY: Rule out Guillain-[place] syndrome.    TECHNIQUE: Multisequence, multiplanar MRI images of the brain were  acquired before and after the administration of IV gadolinium (10  mL  Gadavist).    COMPARISON: Head CT 11/24/2018.    FINDINGS: The ventricles and basal cisterns are normal in  configuration. There is no midline shift. There are no extra-axial  fluid collections. Gray-white differentiation is well maintained.  There is no evidence for stroke or acute intracranial hemorrhage.  There is no abnormal contrast enhancement in the brain or its  coverings.    There is no sinusitis or mastoiditis.      Impression    IMPRESSION: Normal brain MRI.    YUDI CAGLE MD   Gram stain   Result Value Ref Range    Specimen Description Cerebrospinal fluid     Gram Stain No organisms seen     Gram Stain Rare  WBC'S seen       Gram Stain Smear performed on cytospin preparation     Gram Stain       Gram stain slide reviewed at the [facility]   Glucose CSF: Tube 2   Result Value Ref Range    Glucose CSF 57 40 - 70 mg/dL   Protein total CSF: Tube 2   Result Value Ref Range    Protein Total CSF 42 15 - 60 mg/dL   Cell count with differential CSF: Tube 4   Result Value Ref Range    WBC CSF 0 0 - 5 /uL    RBC CSF 0 0 - 2 /uL    Tube Number 4 #    Color CSF Colorless CLRL^Colorless    Appearance CSF Clear CLER^Clear       Medications   0.9% sodium chloride BOLUS (0 mLs Intravenous Stopped 12/6/18 1323)     Followed by   sodium chloride 0.9% infusion (not administered)   0.9% sodium chloride BOLUS (0 mLs Intravenous Stopped 12/6/18 1433)     Followed by   sodium chloride 0.9% infusion (1,000 mLs Intravenous New Bag 12/6/18 1522)   gadobutrol (GADAVIST) injection 10 mL (10 mLs Intravenous Given 12/6/18 1143)     1130: I discussed the case with Dr. Irwin from neurology at [facility] who shares my concern about this being either Guillain-Barré or transverse myelitis.  Recommend obtaining a lumbar puncture and obtaining extra fluid for further analysis and to proceed with an MRI brain to make sure there is not another type of etiology.  Will hold off at  this time for doing an MRI of the cervical and thoracic spine.  We will call back when the results of the lumbar puncture return.    1300: Discussed placement with the patient with bed control at the [facility].  Currently there are no beds available so we will talk with an alternative site for admission.    1410: Discussed the case with placement at [facility] who will call the hospitalist service to arrange admission.  They called back a considerable time later (1600) to inform us that hospitalist service is on diversion and they would not be able to take this patient.    1608: I discussed the case with Dr. Beltran who is the hospitalist at [facility] who accepts the patient in transfer for admission.  She would like me to talk to the neurologist on-call to see if there is additional things that need to be done for the patient on a more emergent basis.  I did talk with neurology on-call, Nelsy, who recommend that we probably get an MRI of the cervical spine and thoracic spine to rule out transverse myelitis before giving the patient either IVIG or dexamethasone.  She feels that both of these could be done at [facility] rather than delaying transfer any further.    Assessments & Plan (with Medical Decision Making)  Crescencio Moncada is a 33-year-old male presenting to the ED with rapid progressing ascending weakness in both legs associated with numbness in both the arms and legs and some dysphasia that is worrisome for Guillain-Barré or transverse myelitis.  Patient needs workup beyond the capability of our institution and is therefore being transferred to [facility] for further workup.     I have reviewed the nursing notes.    I have reviewed the findings, diagnosis, plan and need for follow up with the patient.       New Prescriptions    No medications on file       Final diagnoses:   Guillain Barré syndrome (H)       12/6/2018   [facility] EMERGENCY DEPARTMENT      Frederick Mcnulty MD  12/06/18 5350

## 2018-12-06 NOTE — IP AVS SNAPSHOT
[address] [facility]    [address]    [place] [address]    Phone:  [phone]                                       After Visit Summary   12/6/2018    Crescencio Moncada    MRN: [id number]           After Visit Summary Signature Page     I have received my discharge instructions, and my questions have been answered. I have discussed any challenges I see with this plan with the nurse or doctor.    ..........................................................................................................................................  Patient/Patient Representative Signature      ..........................................................................................................................................  Patient Representative Print Name and Relationship to Patient    ..................................................               ................................................  Date                                   Time    ..........................................................................................................................................  Reviewed by Signature/Title    ...................................................              ..............................................  Date                                               Time          22EPIC Rev 08/18

## 2018-12-06 NOTE — IP AVS SNAPSHOT
MRN:[id number]                      After Visit Summary   12/6/2018    Crescencio Moncada    MRN: [id number]           Thank you!     Thank you for choosing [facility] for your care. Our goal is always to provide you with excellent care. Hearing back from our patients is one way we can continue to improve our services. Please take a few minutes to complete the written survey that you may receive in the mail after you visit with us. Thank you!        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                  Who to Call     For medical emergencies, please call 911.  For non-urgent questions about your medical care, please call your primary care provider or clinic, [phone]          Attending Provider     Provider Specialty    Becca Beltran MD Internal Medicine    Delfina Ybarra MD Internal Medicine       Primary Care Provider Office Phone # Fax #    Apryl MAGALIS Leroy [phone]       When to contact your care team       Call your primary doctor if you have any of the following: worsening symptoms, shortness of breath, increasing weakness, diplopia.                  After Care Instructions     Activity       Your activity upon discharge: activity as tolerated            Diet       Follow this diet upon discharge: Orders Placed This Encounter      Combination Diet Regular Diet Adult            Discharge Instructions       Complete the course of Penicilin V prescribed prior to admission.                  Follow-up  Appointments     Follow-up and recommended labs and tests        Follow up with primary care provider, Apryl Correia, within 7 days to evaluate medication change and for hospital follow- up.  No follow up labs or test are needed.     Neurology follow up as recommended.                  Your next 10 appointments already scheduled     Dec 12, 2018  9:00 AM CST   New Visit with John Bowens PA-C   [facility]    [id number] [place] [id number] [phone]              Further instructions from your care team       PLEASE MAKE APPOINTMENT FOR AN EMG AT [facility] IN 10 DAYS TELL THEM YOU WERE REFERRED BY CAMMIE BALLARD NP    MAKE AN APPOINTMENT WITH A NEUROLOGIST TO GO OVER EMG RESULTS, THIS SHOULD HAPPEN SHORTLY AFTER EMG IS COMPLETED (WITHIN THE NEXT TWO WEEKS) TELL THEM THIS WHEN YOU MAKE APPOINTMENT        Please follow up with neurology in around the time EMG.  You may call any of the following neurology clinics for an appointment or a clinic of your choice. Tell them you were recently hospitalized and need follow up as recommended at discharge.    1. [facility]   [address], [address]   [place] [address]   [phone]  2. [facility]   [address], [address]   [place], [place] [address] [phone]  3. [facility]   Alexander Scanlon MD   [address] [place], [place] [id number] [phone]  4. [facility] Yolis Scanlon MD   [address]   [place], [place] [id number] [phone]        Pending Results     Date and Time Order Name Status Description    12/7/2018 0001 Vitamin B1 whole blood In process     12/7/2018 0001 Rheumatoid factor In process     12/7/2018 0001 Anti Nuclear Suha IgG by IFA with Reflex In process     12/7/2018 0001 Lyme Disease Suha with reflex to WB Serum In process     12/6/2018 2040 Porphyrins and  "Porphobilinogen Urine In process     2018 1052 Lyme IgG and IgM CSF Immunoblot: Tube 3 In process     2018 1052 CSF Culture Aerobic Bacterial Preliminary             Statement of Approval     Ordered          18 1042  I have reviewed and agree with all the recommendations and orders detailed in this document.  EFFECTIVE NOW     Approved and electronically signed by:  Kaylene Bay MD             Admission Information     Date & Time Provider Department Dept. Phone    2018 Delfina Ybarra MD [address] [facility] [phone]      Your Vitals Were     Blood Pressure Pulse Temperature Respirations Pulse Oximetry       122/73 (BP Location: Right arm) 61 97.8  F (36.6  C) (Oral) 16 94%       MyChart Information     [facility] lets you send messages to your doctor, view your test results, renew your prescriptions, schedule appointments and more. To sign up, go to [email or web address] . Click on \"Log in\" on the left side of the screen, which will take you to the Welcome page. Then click on \"Sign up Now\" on the right side of the page.     You will be asked to enter the access code listed below, as well as some personal information. Please follow the directions to create your username and password.     Your access code is: [email or web address]  Expires: 2019  8:16 PM     Your access code will  in 90 days. If you need help or a new code, please call your [facility] clinic or [phone].        Care EveryWhere ID     This is your Care EveryWhere ID. This could be used by other organizations to access your [facility] medical records  [id number]        Equal Access to Services     [facility]: Hadii dionte Nicholas, waaxda lukayceadaha, qaybta kaalpatricia coy. So [facility] [phone].    ATENCIÓN: Si habla español, tiene a mcneal disposición servicios gratuitos de asistencia lingüística. Llame al [phone].    We comply with " applicable federal civil rights laws and [place] laws. We do not discriminate on the basis of race, color, national origin, age, disability, sex, sexual orientation, or gender identity.               Review of your medicines      START taking        Dose / Directions    predniSONE 10 MG tablet   Commonly known as:  DELTASONE        Take 20 mg PO once on 12/09 and 10 mg Po once on 12/10 then stop.   Quantity:  3 tablet   Refills:  0         CONTINUE these medicines which have NOT CHANGED        Dose / Directions    pantoprazole 40 MG EC tablet   Commonly known as:  PROTONIX   Used for:  Gastroesophageal reflux disease with esophagitis        Dose:  40 mg   Take 1 tablet (40 mg) by mouth daily Take 30-60 minutes before a meal.   Quantity:  90 tablet   Refills:  1            Where to get your medicines      These medications were sent to [facility] [address] [place] [address] [address] [address]     Phone:  [phone]     predniSONE 10 MG tablet                Protect others around you: Learn how to safely use, store and throw away your medicines at www.disposemymeds.org.             Medication List: This is a list of all your medications and when to take them. Check marks below indicate your daily home schedule. Keep this list as a reference.      Medications           Morning Afternoon Evening Bedtime As Needed    pantoprazole 40 MG EC tablet   Commonly known as:  PROTONIX   Take 1 tablet (40 mg) by mouth daily Take 30-60 minutes before a meal.   Last time this was given:  40 mg on 12/8/2018  6:31 AM   Next Dose Due:  12/9/18 AM                                   predniSONE 10 MG tablet   Commonly known as:  DELTASONE   Take 20 mg PO once on 12/09 and 10 mg Po once on 12/10 then stop.   Last time this was given:  40 mg on 12/8/2018  8:23 AM   Next Dose Due:  12/9/18 AM                                             More Information        Prednisone tablets  Brand Names:  Deltasone, Predone, Sterapred, Sterapred DS  What is this medicine?  PREDNISONE (PRED ni sone) is a corticosteroid. It is commonly used to treat inflammation of the skin, joints, lungs, and other organs. Common conditions treated include asthma, allergies, and arthritis. It is also used for other conditions, such as blood disorders and diseases of the adrenal glands.  How should I use this medicine?  Take this medicine by mouth with a glass of water. Follow the directions on the prescription label. Take this medicine with food. If you are taking this medicine once a day, take it in the morning. Do not take more medicine than you are told to take. Do not suddenly stop taking your medicine because you may develop a severe reaction. Your doctor will tell you how much medicine to take. If your doctor wants you to stop the medicine, the dose may be slowly lowered over time to avoid any side effects.  Talk to your pediatrician regarding the use of this medicine in children. Special care may be needed.  What side effects may I notice from receiving this medicine?  Side effects that you should report to your doctor or health care professional as soon as possible:    allergic reactions like skin rash, itching or hives, swelling of the face, lips, or tongue    changes in emotions or moods    changes in vision    depressed mood    eye pain    fever or chills, cough, sore throat, pain or difficulty passing urine    increased thirst    swelling of ankles, feet  Side effects that usually do not require medical attention (report to your doctor or health care professional if they continue or are bothersome):    confusion, excitement, restlessness    headache    nausea, vomiting    skin problems, acne, thin and shiny skin    trouble sleeping    weight gain  What may interact with this medicine?  Do not take this medicine with any of the following medications:    metyrapone    mifepristone  This medicine may also interact with the  following medications:    aminoglutethimide    amphotericin B    aspirin and aspirin-like medicines    barbiturates    certain medicines for diabetes, like glipizide or glyburide    cholestyramine    cholinesterase inhibitors    cyclosporine    digoxin    diuretics    ephedrine    female hormones, like estrogens and birth control pills    isoniazid    ketoconazole    NSAIDS, medicines for pain and inflammation, like ibuprofen or naproxen    phenytoin    rifampin    toxoids    vaccines    warfarin  What if I miss a dose?  If you miss a dose, take it as soon as you can. If it is almost time for your next dose, talk to your doctor or health care professional. You may need to miss a dose or take an extra dose. Do not take double or extra doses without advice.  Where should I keep my medicine?  Keep out of the reach of children.  Store at room temperature between 15 and 30 degrees C (59 and 86 degrees F). Protect from light. Keep container tightly closed. Throw away any unused medicine after the expiration date.  What should I tell my health care provider before I take this medicine?  They need to know if you have any of these conditions:    Cushing's syndrome    diabetes    glaucoma    heart disease    high blood pressure    infection (especially a virus infection such as chickenpox, cold sores, or herpes)    kidney disease    liver disease    mental illness    myasthenia gravis    osteoporosis    seizures    stomach or intestine problems    thyroid disease    an unusual or allergic reaction to lactose, prednisone, other medicines, foods, dyes, or preservatives    pregnant or trying to get pregnant    breast-feeding  What should I watch for while using this medicine?  Visit your doctor or health care professional for regular checks on your progress. If you are taking this medicine over a prolonged period, carry an identification card with your name and address, the type and dose of your medicine, and your doctor's name  and address.  This medicine may increase your risk of getting an infection. Tell your doctor or health care professional if you are around anyone with measles or chickenpox, or if you develop sores or blisters that do not heal properly.  If you are going to have surgery, tell your doctor or health care professional that you have taken this medicine within the last twelve months.  Ask your doctor or health care professional about your diet. You may need to lower the amount of salt you eat.  This medicine may affect blood sugar levels. If you have diabetes, check with your doctor or health care professional before you change your diet or the dose of your diabetic medicine.  NOTE:This sheet is a summary. It may not cover all possible information. If you have questions about this medicine, talk to your doctor, pharmacist, or health care provider. Copyright  2018 [employer]              Electromyogram  Your test is on (date) ________________ at (time) __________  What is an electromyogram (EMG)?  This test uses electrodes and a needle to measure the electrical signals of your nerves and muscles. It helps find the cause of weakness, spasms, numbness or pain in the arms, legs or face. The test helps diagnose muscle and nerve diseases.  How is the test done?  The test takes 30 to 60 minutes. Before the test, you will have a brief exam.  Part 1    We place electrodes on the skin.    An electric charge is sent along the nerve.    This allows us to measure how well the nerve carries the signal.    Part 2    The doctor inserts a fine needle into various muscle groups. There is no electric charge.    The doctor can see and hear the muscle's electrical activity on the monitors.    Will it hurt?  Most people handle the test without a problem. You may feel shocks or muscle twitches during Part 1, and you may feel pressure or pinching during part 2.  We do not prescribe medicine for this test. If you feel very anxious, call your  regular doctor and discuss options to help you relax.  How do I get the results?  Your regular doctor will receive a report in about a week. You will need to call your doctor.  For informational purposes only. Not to replace the advice of your health care provider.  Copyright   2015 [employer]. All rights reserved. Clinically reviewed by [facility]. ufindads [id number] - REV 09/18.            Understanding Dysphagia    If you have a problem swallowing foods or liquids, you may have dysphagia. This condition has a number of causes. Your healthcare provider can find out what is causing your problem and help relieve your symptoms.  When You Swallow  Your tongue pushes foods or liquids from your mouth to your throat as you eat or drink and swallow. They then pass down the esophagus (a muscular tube) into the stomach. To keep foods or liquids moving, the esophagus muscles tighten and relax in wavelive motions.  Causes of dysphagia  With dysphagia, foods or liquids do not easily pass down the esophagus. Dysphagia may happen if the esophagus walls thicken, causing a narrowing (stricture) of the passage. Dysphagia can also be caused by any of the following:    A problem in the esophagus, such as an ulcer, stricture, irritation, infection, inflammation, or cancer    Muscles in your mouth, throat, or esophagus that don t work right or are not coordinated    A nerve or brain problem (such as a stroke) that leaves your mouth, tongue, or throat muscles weak or changes how your muscles coordinate  Common symptoms  If you have dysphagia, you may:    Feel chest pressure or pain when you swallow    Choke or cough when swallowing    Vomit after eating or drinking    Regurgitate food or liquid out your nose     Aspirate (inhale into the lungs) foods or liquids when you swallow    Have fatigue and weight loss  Date Last Reviewed: 7/1/2016 2000-2018 [employer]  [address] [place] [address]. All rights reserved. This information is not intended as a substitute for professional medical care. Always follow your healthcare professional's instructions.

## 2018-12-07 ENCOUNTER — APPOINTMENT (OUTPATIENT)
Dept: PHYSICAL THERAPY | Facility: CLINIC | Age: 33
DRG: 057 | End: 2018-12-07
Attending: INTERNAL MEDICINE
Payer: COMMERCIAL

## 2018-12-07 ENCOUNTER — APPOINTMENT (OUTPATIENT)
Dept: MRI IMAGING | Facility: CLINIC | Age: 33
DRG: 057 | End: 2018-12-07
Attending: INTERNAL MEDICINE
Payer: COMMERCIAL

## 2018-12-07 ENCOUNTER — APPOINTMENT (OUTPATIENT)
Dept: SPEECH THERAPY | Facility: CLINIC | Age: 33
DRG: 057 | End: 2018-12-07
Attending: NURSE PRACTITIONER
Payer: COMMERCIAL

## 2018-12-07 LAB
ALBUMIN SERPL-MCNC: 3.3 G/DL (ref 3.4–5)
ALP SERPL-CCNC: 50 U/L (ref 40–150)
ALT SERPL W P-5'-P-CCNC: 25 U/L (ref 0–70)
ANION GAP SERPL CALCULATED.3IONS-SCNC: 5 MMOL/L (ref 3–14)
AST SERPL W P-5'-P-CCNC: 13 U/L (ref 0–45)
BILIRUB SERPL-MCNC: 0.5 MG/DL (ref 0.2–1.3)
BUN SERPL-MCNC: 11 MG/DL (ref 7–30)
CALCIUM SERPL-MCNC: 8.6 MG/DL (ref 8.5–10.1)
CHLORIDE SERPL-SCNC: 110 MMOL/L (ref 94–109)
CO2 SERPL-SCNC: 26 MMOL/L (ref 20–32)
CREAT SERPL-MCNC: 0.89 MG/DL (ref 0.66–1.25)
ERYTHROCYTE [DISTWIDTH] IN BLOOD BY AUTOMATED COUNT: 12.3 % (ref 10–15)
GFR SERPL CREATININE-BSD FRML MDRD: >90 ML/MIN/1.7M2
GLUCOSE SERPL-MCNC: 87 MG/DL (ref 70–99)
HCT VFR BLD AUTO: 41.6 % (ref 40–53)
HGB BLD-MCNC: 14.5 G/DL (ref 13.3–17.7)
HSV1 DNA CSF QL NAA+PROBE: NOT DETECTED
HSV2 DNA CSF QL NAA+PROBE: NOT DETECTED
MCH RBC QN AUTO: 30.6 PG (ref 26.5–33)
MCHC RBC AUTO-ENTMCNC: 34.9 G/DL (ref 31.5–36.5)
MCV RBC AUTO: 88 FL (ref 78–100)
MICROBIOLOGIST REVIEW: NORMAL
PLATELET # BLD AUTO: 275 10E9/L (ref 150–450)
POTASSIUM SERPL-SCNC: 3.9 MMOL/L (ref 3.4–5.3)
PROT SERPL-MCNC: 6.7 G/DL (ref 6.8–8.8)
RBC # BLD AUTO: 4.74 10E12/L (ref 4.4–5.9)
SODIUM SERPL-SCNC: 141 MMOL/L (ref 133–144)
WBC # BLD AUTO: 5.1 10E9/L (ref 4–11)

## 2018-12-07 PROCEDURE — 80053 COMPREHEN METABOLIC PANEL: CPT | Performed by: INTERNAL MEDICINE

## 2018-12-07 PROCEDURE — 40000225 ZZH STATISTIC SLP WARD VISIT

## 2018-12-07 PROCEDURE — 72157 MRI CHEST SPINE W/O & W/DYE: CPT

## 2018-12-07 PROCEDURE — 97161 PT EVAL LOW COMPLEX 20 MIN: CPT | Mod: GP

## 2018-12-07 PROCEDURE — 40000275 ZZH STATISTIC RCP TIME EA 10 MIN

## 2018-12-07 PROCEDURE — 92610 EVALUATE SWALLOWING FUNCTION: CPT | Mod: GN

## 2018-12-07 PROCEDURE — 40000193 ZZH STATISTIC PT WARD VISIT

## 2018-12-07 PROCEDURE — 72158 MRI LUMBAR SPINE W/O & W/DYE: CPT

## 2018-12-07 PROCEDURE — 40000809 ZZH STATISTIC NO DOCUMENTATION TO SUPPORT CHARGE

## 2018-12-07 PROCEDURE — 25000132 ZZH RX MED GY IP 250 OP 250 PS 637: Performed by: INTERNAL MEDICINE

## 2018-12-07 PROCEDURE — A9585 GADOBUTROL INJECTION: HCPCS | Performed by: INTERNAL MEDICINE

## 2018-12-07 PROCEDURE — 94150 VITAL CAPACITY TEST: CPT

## 2018-12-07 PROCEDURE — 84425 ASSAY OF VITAMIN B-1: CPT | Performed by: INTERNAL MEDICINE

## 2018-12-07 PROCEDURE — 86618 LYME DISEASE ANTIBODY: CPT | Performed by: INTERNAL MEDICINE

## 2018-12-07 PROCEDURE — 72156 MRI NECK SPINE W/O & W/DYE: CPT

## 2018-12-07 PROCEDURE — 86431 RHEUMATOID FACTOR QUANT: CPT | Performed by: INTERNAL MEDICINE

## 2018-12-07 PROCEDURE — 99222 1ST HOSP IP/OBS MODERATE 55: CPT | Performed by: NURSE PRACTITIONER

## 2018-12-07 PROCEDURE — 25500064 ZZH RX 255 OP 636: Performed by: INTERNAL MEDICINE

## 2018-12-07 PROCEDURE — 85027 COMPLETE CBC AUTOMATED: CPT | Performed by: INTERNAL MEDICINE

## 2018-12-07 PROCEDURE — 99232 SBSQ HOSP IP/OBS MODERATE 35: CPT | Performed by: HOSPITALIST

## 2018-12-07 PROCEDURE — 25000125 ZZHC RX 250: Performed by: NURSE PRACTITIONER

## 2018-12-07 PROCEDURE — 86038 ANTINUCLEAR ANTIBODIES: CPT | Performed by: INTERNAL MEDICINE

## 2018-12-07 PROCEDURE — 12000000 ZZH R&B MED SURG/OB

## 2018-12-07 RX ORDER — GADOBUTROL 604.72 MG/ML
10 INJECTION INTRAVENOUS ONCE
Status: COMPLETED | OUTPATIENT
Start: 2018-12-07 | End: 2018-12-07

## 2018-12-07 RX ORDER — PREDNISONE 20 MG/1
60 TABLET ORAL DAILY
Status: COMPLETED | OUTPATIENT
Start: 2018-12-07 | End: 2018-12-07

## 2018-12-07 RX ORDER — PREDNISONE 20 MG/1
40 TABLET ORAL DAILY
Status: COMPLETED | OUTPATIENT
Start: 2018-12-08 | End: 2018-12-08

## 2018-12-07 RX ORDER — PREDNISONE 20 MG/1
20 TABLET ORAL DAILY
Status: DISCONTINUED | OUTPATIENT
Start: 2018-12-09 | End: 2018-12-08 | Stop reason: HOSPADM

## 2018-12-07 RX ORDER — PREDNISONE 5 MG/1
10 TABLET ORAL DAILY
Status: DISCONTINUED | OUTPATIENT
Start: 2018-12-10 | End: 2018-12-08 | Stop reason: HOSPADM

## 2018-12-07 RX ADMIN — PANTOPRAZOLE SODIUM 40 MG: 40 TABLET, DELAYED RELEASE ORAL at 07:21

## 2018-12-07 RX ADMIN — PENICILLIN V POTASSIUM 500 MG: 500 TABLET, FILM COATED ORAL at 20:18

## 2018-12-07 RX ADMIN — GADOBUTROL 10 ML: 604.72 INJECTION INTRAVENOUS at 10:13

## 2018-12-07 RX ADMIN — PENICILLIN V POTASSIUM 500 MG: 500 TABLET, FILM COATED ORAL at 07:22

## 2018-12-07 RX ADMIN — PREDNISONE 60 MG: 20 TABLET ORAL at 16:38

## 2018-12-07 ASSESSMENT — ACTIVITIES OF DAILY LIVING (ADL)
ADLS_ACUITY_SCORE: 7

## 2018-12-07 NOTE — PROGRESS NOTES
" 12/07/18 1100   Quick Adds   Type of Visit Initial PT Evaluation   Living Environment   Lives With spouse   Living Arrangements house   Home Accessibility stairs to enter home;stairs within home   Number of Stairs to Enter Home 3   Number of Stairs Within Home 10   Stair Railings at Home outside, present on right side;inside, present on right side   Transportation Available car;family or friend will provide   Living Environment Comment bedroom upstairs   Self-Care   Dominant Hand right   Usual Activity Tolerance good   Current Activity Tolerance good   Regular Exercise no   Activity/Exercise/Self-Care Comment Pt lays tile for a living, very active   Functional Level Prior   Ambulation 0-->independent   Transferring 0-->independent   Toileting 0-->independent   Bathing 0-->independent   Dressing 0-->independent   Eating 0-->independent   Communication 0-->understands/communicates without difficulty   Swallowing 0-->swallows foods/liquids without difficulty   Cognition 0 - no cognition issues reported   Fall history within last six months no   General Information   Onset of Illness/Injury or Date of Surgery - Date 12/06/18   Referring Physician Delfina Ybarra MD   Patient/Family Goals Statement \"Go home\"   Pertinent History of Current Problem (include personal factors and/or comorbidities that impact the POC) 33 YOM recently returned from a [employer] cruise with difficulty swallowing. Diagnosed with eosinophilic esophagitis. Now presented with BUE/LE numbness and weakness. Work-up pending.    Precautions/Limitations fall precautions   Weight-Bearing Status - LUE full weight-bearing   Weight-Bearing Status - RUE full weight-bearing   Weight-Bearing Status - LLE full weight-bearing   Weight-Bearing Status - RLE full weight-bearing   General Observations Pleasant and cooperative   General Info Comments Activity: up with assist   Cognitive Status Examination   Orientation orientation to person, place and time   Level of " "Consciousness alert   Follows Commands and Answers Questions 100% of the time;able to follow multistep instructions   Personal Safety and Judgment intact   Memory intact   Cognitive Comment no concerns   Pain Assessment   Patient Currently in Pain No   Posture    Posture Not impaired   Range of Motion (ROM)   ROM Comment BLEs WFL   Strength   Strength Comments BLEs 5/5 throughout   Bed Mobility   Bed Mobility Comments Independent   Transfer Skills   Transfer Comments Independent   Gait   Gait Comments Independent gait x 400' with no LOB or gait defificts observed despite balance challenges. Up/down stairs modified independent with railing and reciprocal pattern   Balance   Balance no deficits were identified   Balance Comments Good static and dynamic sitting and standing. Able to stand EO/EC with feet together, SLS x 10 sec B, picking an object up off the floor, turning 360 degrees   Sensory Examination   Sensory Perception Comments Pt denies numbness in BUE/LEs. Reports \"feet feel like normal\"   Coordination   Coordination no deficits were identified   General Therapy Interventions   Intervention Comments eval only   Clinical Impression   Criteria for Skilled Therapeutic Intervention evaluation only   Clinical Presentation Stable/Uncomplicated   Clinical Presentation Rationale independent   Clinical Decision Making (Complexity) Low complexity   Therapy Frequency` other (see comments)  (eval only)   Predicted Duration of Therapy Intervention (days/wks) 1 day   Anticipated Discharge Disposition Home   Risk & Benefits of therapy have been explained Yes   Patient, Family & other staff in agreement with plan of care Yes   Clinical Impression Comments OT screen completed with no skilled OT needs identified   [facility] AM-P[facility] TM \"6 Clicks\"   2016, Trustees of [facility], under license to [employer].  All rights reserved.   6 Clicks Short Forms Basic Mobility Inpatient Short Form   [facility] " "AM-PAC  \"6 Clicks\" V.2 Basic Mobility Inpatient Short Form   1. Turning from your back to your side while in a flat bed without using bedrails? 4 - None   2. Moving from lying on your back to sitting on the side of a flat bed without using bedrails? 4 - None   3. Moving to and from a bed to a chair (including a wheelchair)? 4 - None   4. Standing up from a chair using your arms (e.g., wheelchair, or bedside chair)? 4 - None   5. To walk in hospital room? 4 - None   6. Climbing 3-5 steps with a railing? 4 - None   Basic Mobility Raw Score (Score out of 24.Lower scores equate to lower levels of function) 24   Total Evaluation Time   Total Evaluation Time (Minutes) 15     "

## 2018-12-07 NOTE — H&P
[facility]    History and Physical  Hospitalist       Date of Admission:  12/6/2018    Assessment & Plan   Crescencio Moncada is a 33 year old male who was recently diagnosed with eosinophilic esophagitis who presents with bilateral upper and lower extremity numbness and weakness that started yesterday morning.    Lower extremity weakness/numbness  -Unclear etiology.  No history of recent trauma, LP done at [facility] ER Showed 0 WBCs, 57 glucose CSF, protein 42.  MRI of the brain was unremarkable  -Neurology was contacted in the ED who recommended getting MRI of spine before initiating treatment  -We will order MRI C-spine, thoracic spine and L-spine.  Fall precaution, formal neurologic consultation  -Currently the patient does not have any respiratory symptoms,  -will monitor with forced vital capacity/negative inspiratory force for rapid progression of his symptoms, monitor with telemetry and continuous pulse ox  -Check for Lyme serology, thiamine level, KYRIE, porphyria  -PT OT evaluation and treatment    Recent history of strep throat  -Continue PTA penicillin V    Recent diagnosis of eosinophilic esophagitis  Dysphagia related to above  Continue PTA Protonix.    # Pain Assessment:  Current Pain Score 12/6/2018   Patient currently in pain? -   Pain score (0-10) 0   Pain location -   Pain descriptors -   Crescencio grajeda pain level was assessed and he currently denies pain.      DVT Prophylaxis: Pneumatic Compression Devices  Code Status: Full Code    Disposition: Expected discharge in 2+ days once clinical improvement.    Delifna Ybarra MD    Primary Care Physician   Apryl Correia    Chief Complaint   Lateral lower extremity numbness and weakness    History is obtained from the patient    History of Present Illness   Crescencio Moncada is a 33 year old male who was recently diagnosed with eosinophilic esophagitis who presents with bilateral upper and lower extremity numbness and weakness that  started yesterday morning.    Patient reports that he woke up yesterday he started noticing both his hands and feet numb.  He started experiencing pins and needles on his toes which progressed to his feet and also hands.  This morning when he woke up he started feeling complete numbness on his bilateral lower extremity and was having difficulty with standing.  He could not stand for more than a minute without feeling unsteady.  He denies any fall or loss of consciousness.  He denies any back pain more than his usual pain.  He denies any chest pain, cough, nausea or vomiting, shortness of breath in the last 2 days.  He reports for last 10-12 days he had only 2 bowel movements which is unusual for him.  He denies any problems with urination.  He denies any abdominal pain.  Denies any sick contacts.  He denies any known tick bites.    The patient was evaluated at [facility] ER, had a lumbar puncture done and a brain MRI which was negative.  CBC and CMP was unremarkable.  CRP was mildly elevated 46.2, sed rate mildly elevated at 16.  After discussion with neurologist on call who recommended MRI of the spine, patient was transferred for further evaluation and care at [facility].      About 3 days back he started having fever with chills and throat pain, diagnosed with strep throat and currently on penicillin V for the last 3 days.  His last fever was yesterday    The patient reports that for the last 10 days or so his symptoms started with unexplained dysphagia while in the [employer] cruise traveling from [place] to [facility].  He got home and his symptoms had started getting worse with dysphagia, painful swallowing, shortness of breath and he also had a syncopal event when he was taken to [facility].  He was diagnosed with eosinophilic esophagitis with ulceration and was discharged on Protonix.  He reports that he had been evaluated for botulism and was told it was negative.    Past Medical  History    See above    Past Surgical History   I have reviewed this patient's surgical history and updated it with pertinent information if needed.  Past Surgical History:   Procedure Laterality Date     COLONOSCOPY WITH CO2 INSUFFLATION N/A 9/11/2017    Procedure: COLONOSCOPY WITH CO2 INSUFFLATION;  EGD, Colonoscopy, Dr Correia referring, BMI 28.11, [facility] ER fax; [phone];  Surgeon: Amber Lowry MD;  Location: [facility] OR     COMBINED ESOPHAGOSCOPY, GASTROSCOPY, DUODENOSCOPY (EGD) WITH CO2 INSUFFLATION N/A 9/11/2017    Procedure: COMBINED ESOPHAGOSCOPY, GASTROSCOPY, DUODENOSCOPY (EGD) WITH CO2 INSUFFLATION;  EGD, Colonoscopy, Dr Correia referring, BMI 28.11, [facility] ER fax; [phone];  Surgeon: Amber Lowry MD;  Location: [facility] OR     ESOPHAGOSCOPY, GASTROSCOPY, DUODENOSCOPY (EGD), COMBINED N/A 9/11/2017    Procedure: COMBINED ESOPHAGOSCOPY, GASTROSCOPY, DUODENOSCOPY (EGD), BIOPSY SINGLE OR MULTIPLE;;  Surgeon: Amber Lowry MD;  Location: [facility] OR     ESOPHAGOSCOPY, GASTROSCOPY, DUODENOSCOPY (EGD), COMBINED N/A 11/27/2018    Procedure: COMBINED ESOPHAGOSCOPY, GASTROSCOPY, DUODENOSCOPY (EGD), BIOPSY SINGLE OR MULTIPLE;  Surgeon: Reji Andujar MD;  Location: [facility]       Prior to Admission Medications   Prior to Admission Medications   Prescriptions Last Dose Informant Patient Reported? Taking?   pantoprazole (PROTONIX) 40 MG EC tablet 12/6/2018 at AM  No Yes   Sig: Take 1 tablet (40 mg) by mouth daily Take 30-60 minutes before a meal.      Facility-Administered Medications: None     Allergies   No Known Allergies    Social History   I have reviewed this patient's social history and updated it with pertinent information if needed. Crescencio LON Moncada  reports that he quit smoking about 12 months ago. His smoking use included Dip, chew, snus or snuff. He has quit using smokeless tobacco. His smokeless tobacco use included Chew. He reports that he drinks alcohol. He reports that he  does not use illicit drugs.    Family History   Family history reviewed with patient and is noncontributory.    Review of Systems   The 10 point Review of Systems is negative other than noted in the HPI or here.   Patient also reports that for the last 2 years or so he intermittently gets dizzy.  He was apparently evaluated and was told he probably has those episode secondary to vasovagal.  He also mentions that he gets blurry vision/dizziness    He has also been having scaly rashes in his palms bilaterally for about 1-1/2-2 years and also on the hands which comes and goes.    Physical Exam   Temp: (P) 98  F (36.7  C) Temp src: (P) Oral BP: (P) 129/77 Pulse: (P) 96   Resp: (P) 16 SpO2: (P) 97 % O2 Device: (P) None (Room air)    Vital Signs with Ranges  Temp:  [97.8  F (36.6  C)-98  F (36.7  C)] (P) 98  F (36.7  C)  Pulse:  [58-96] (P) 96  Resp:  [16] (P) 16  BP: (124-134)/(77-93) (P) 129/77  SpO2:  [94 %-97 %] (P) 97 %  0 lbs 0 oz    Exam:  Constitutional: Awake, alert and no distress. Appears comfortable  Head: Normocephalic. No masses, lesions, tenderness or abnormalities  ENT: ENT exam normal, no neck nodes or sinus tenderness  Cardiovascular: Bradycardic but regular at the time of my evaluation, no murmurs, no rubs or JVD  Respiratory: Normal WOB,b/l equal air entry, no wheezes or crackles   Gastrointestinal: Abdomen soft, non-tender. BS normal. No masses, organomegaly  : Deferred   extremities : No edema , no clubbing or cyanosis    Musculoakeletal : No joint swelling , erythema . No obvious deformity   Skin: Warm and dry, scaly rash on the palms and forearm  Neurologic: Cranial nerves II-XII grossly intact , power 5/5 in all 4 extremities, on my evaluation knee reflexes was normal, so was ankle reflex.  Could not do biceps reflex on the right due to IV line, biceps reflex on the left was normal. Sensation grossly WNL.    Data   Data reviewed today:  I personally reviewed the brain MRI image(s) showing No  acute abnormality.    Recent Labs  Lab 12/06/18  1110   WBC 4.9   HGB 15.1   MCV 87         POTASSIUM 3.9   CHLORIDE 108   CO2 26   BUN 13   CR 0.90   ANIONGAP 7   LEATHA 8.7   *   ALBUMIN 3.6   PROTTOTAL 7.5   BILITOTAL 0.4   ALKPHOS 54   ALT 29   AST 16       Recent Results (from the past 24 hour(s))   MR Brain w/o & w Contrast    Narrative    MRI OF THE BRAIN WITHOUT AND WITH CONTRAST December 6, 2018 12:01 PM     HISTORY: Rule out Guillain-Shubert syndrome.    TECHNIQUE: Multisequence, multiplanar MRI images of the brain were  acquired before and after the administration of IV gadolinium (10 mL  Gadavist).    COMPARISON: Head CT 11/24/2018.    FINDINGS: The ventricles and basal cisterns are normal in  configuration. There is no midline shift. There are no extra-axial  fluid collections. Gray-white differentiation is well maintained.  There is no evidence for stroke or acute intracranial hemorrhage.  There is no abnormal contrast enhancement in the brain or its  coverings.    There is no sinusitis or mastoiditis.      Impression    IMPRESSION: Normal brain MRI.    YUDI CAGLE MD

## 2018-12-07 NOTE — PHARMACY-ADMISSION MEDICATION HISTORY
Admission Medication History    Admission medication history interview status for the 12/6/2018 admission is complete. See EPIC admission navigator for prior to admission medications     Medication history source reliability:Good    Actions taken by pharmacist (provider contacted, etc):None     Additional medication history information not noted on PTA med list :None    Medication reconciliation/reorder completed by provider prior to medication history? No    Time spent in this activity: 10 minutes    Prior to Admission medications    Medication Sig Last Dose Taking? Auth Provider   pantoprazole (PROTONIX) 40 MG EC tablet Take 1 tablet (40 mg) by mouth daily Take 30-60 minutes before a meal. 12/6/2018 at AM Yes Reji Andujar MD David S. Cline, PharmD

## 2018-12-07 NOTE — PROVIDER NOTIFICATION
"Deyvi placed to Maryana NP: \"patient was just up walking.  Numbness has returned to right foot and increased in left foot.  States he felt generalized weakness, not like he was going to fall but weak.\"  "

## 2018-12-07 NOTE — PLAN OF CARE
Problem: Patient Care Overview  Goal: Plan of Care/Patient Progress Review  PT: PT orders received, initial eval completed. Patient lives with his wife in a house with stairs to enter and to access his bedroom. Previously independent with all ADLs and mobility without a device, lays tile for a living, very active at baseline. Pt presented with B UE/LE weakness and numbness. Work-up pending. Pt reports symptoms have now resolved.    Discharge Planner PT   Patient plan for discharge: home  Current status: Pt demonstrates independence with bed mobility, transfers, gait x 400' with no gait deficits observed or LOB with balance challenges, up/down stairs with a railing. No LOB with static and dynamic standing balance challenges. OT screen completed with no skilled OT needs identified.  Barriers to return to prior living situation: medical statis  Recommendations for discharge: home  Rationale for recommendations: No further skilled PT/OT needs identified. Will sign off.       Entered by: Farida Sunshine 12/07/2018 12:05 PM

## 2018-12-07 NOTE — PLAN OF CARE
Problem: Patient Care Overview  Goal: Plan of Care/Patient Progress Review  Discharge Planner OT   Patient plan for discharge: n/a   Current status: Orders rec'd and chart reviewed. Pt previously independent with all ADLs and mobility without a device, lays tile for a living, very active at baseline. Pt presented with B UE/LE weakness and numbness. PT screened for OT and pt is I with all ADLs and has no inpatient OT needs at this time. Will complete inpatient OT orders.   Barriers to return to prior living situation: none   Recommendations for discharge: home   Rationale for recommendations:  no skilled OT needs identified.       Entered by: Yaritza Reid 12/07/2018 1:16 PM

## 2018-12-07 NOTE — PROGRESS NOTES
[facility]    Hospitalist Progress Note  Date of Service (when I saw the patient): 12/07/2018    Assessment & Plan   Crescencio Moncada is a 33 year old male who was recently diagnosed with eosinophilic esophagitis who presents with bilateral upper and lower extremity numbness and weakness that started yesterday morning.     Lower extremity weakness/numbness  -Unclear etiology. No history of recent trauma, LP done at [facility] ER was unremarkable, showed 0 WBCs, 57 glucose CSF, protein 42.  MRI of the brain was unremarkable. Numbness had resolved, but relapsed in Lt foot (below ankle) since this am.   -Neuro consulted and following  -thoracic and cervical MRI pending.   -DTRs present though sluggish on Lt achillis, does not have any respiratory symptoms, monitoring  FVC and NIF, telemetry and continuous pulse ox   - Lyme serology, thiamine level, KYRIE, porphyria pending.  - PT OT evaluation and treatment     Recent history of strep throat  -Continue PTA penicillin V     Recent diagnosis of eosinophilic esophagitis  Dysphagia related to above  Continue PTA Protonix.  Did not tolerate omeprazole    DVT Prophylaxis: Pneumatic Compression Devices, encourage ambulation.  Code Status: Full Code    Disposition: Expected discharge: pending neuro work up. Given Unclear etiologoy and relapsing nature of the symptoms, needs ongoing neuro work up, possibly at least another day. Discussed with patient.     Kaylene Bay MD  Hospitalist    Interval History   Patient reports the numbness over the lower extremities has resolved but returned this am in his Lt foot, below ankle.   - no blurry vision, no diplopia.     -Data reviewed today: I reviewed all new labs and imaging results over the last 24 hours. I personally reviewed the EKG tracing showing sinus on tele. MRI Lumbar spine report reviewed. .    Physical Exam   Temp: 98.2  F (36.8  C) Temp src: Oral BP: 132/60 Pulse: 61   Resp: 16 SpO2: 99 % O2  Device: None (Room air)    There were no vitals filed for this visit.  Vital Signs with Ranges  Temp:  [97.9  F (36.6  C)-98.2  F (36.8  C)] 98.2  F (36.8  C)  Pulse:  [61-96] 61  Resp:  [14-18] 16  BP: (102-132)/(60-81) 132/60  SpO2:  [96 %-99 %] 99 %       Constitutional: Alert, awake. Not in distress.   HEENT: PERRLA EOMI  Respiratory: Bilateral equal air entry, clear to auscultation. No respiratory distress.  Cardiovascular: Regular s1s2, no murmur, rub or gallop. No tachycardia.  GI: Soft, non distended, non tender, bowel tones active.  Skin/Integumen: No rash, no blister.  Neuro/Psych:   AAOx3, CN 2-12 intact.  Strength symmetrical. Sensation  diminished on Lt alteral foot dorsal aspect, DTRs normal excelp Lt achillis that is absent.      Medications       pantoprazole  40 mg Oral Daily     penicillin V  500 mg Oral BID     senna-docusate  1 tablet Oral BID       Data     Recent Labs  Lab 12/07/18  0825 12/06/18  1110   WBC 5.1 4.9   HGB 14.5 15.1   MCV 88 87    280    141   POTASSIUM 3.9 3.9   CHLORIDE 110* 108   CO2 26 26   BUN 11 13   CR 0.89 0.90   ANIONGAP 5 7   LEATHA 8.6 8.7   GLC 87 102*   ALBUMIN 3.3* 3.6   PROTTOTAL 6.7* 7.5   BILITOTAL 0.5 0.4   ALKPHOS 50 54   ALT 25 29   AST 13 16       Recent Results (from the past 24 hour(s))   MR Lumbar Spine w/o & w Contrast    Narrative    MR LUMBAR SPINE WITHOUT AND WITH CONTRAST  12/7/2018 10:33 AM    HISTORY:  Numbness and weakness in extremities.     TECHNIQUE: Sagittal and transverse T1 and T2, sagittal inversion  recovery, and post gadolinium enhanced sagittal and transverse  T1-weighted pulse sequences.    DOSE:  10 mL Gadavist    FINDINGS: Five lumbar vertebrae are assumed. There may be very mild or  early degenerative loss of disc signal at L5-S1 and L4-L5 with normal  disc height throughout. Minimal discogenic endplate reactive marrow  edema is likely present at L5-S1. Apophyseal joints are essentially  within normal limits with no  periarticular marrow edema or  degenerative spondylolisthesis. Vertebral body heights are normal. The  conus medullaris is unremarkable in appearance on the sagittal images.   Transverse images from the inferior aspect of the T12 vertebral body  to the top of the sacrum reveal no lumbar disc bulge or herniation.  There is no central or foraminal stenosis.      Impression    IMPRESSION:  1. Mild or early degenerative disc disease in the lower lumbar spine.  This may be associated with mild discogenic endplate reactive marrow  edema at L5-S1.  2. No lumbar disc herniation, stenosis, or apparent neural  impingement.

## 2018-12-07 NOTE — PROGRESS NOTES
[place] - Suicide Severity Rating Scale Completed? Yes  If yes, what color did the patient score? White

## 2018-12-07 NOTE — PLAN OF CARE
Problem: Patient Care Overview  Goal: Plan of Care/Patient Progress Review  Discharge Planner SLP   Patient plan for discharge: Did not discuss   Current status: Pt seen for bedside swallow evaluation. Recently seen by SLP and GI during admit at [facility] the end of October. Pt completed video swallow, esophagram and upper GI at this time. He feels his swallowing is slightly improved at this time, and reports no difficulties eating and drinking he most often notices trouble intermittently with his saliva and when laying down. He nor his wife have noted any correlations to food items, positions or contextual factors that seem to increase his difficulty swallowing . Pt is taking PPI medication and has planned follow up session with GI. Pt was assessed with thin liquids, puree, and regular solids. Oral masitcation, bolus formation and oral clearance is unremarkable. No overt s/sx of aspiration, no changes in breath sounds or vocal quality. Single swallows per bolus. Pt denies any pain or globus sensation during evaluation.     Continue regular solids and thin liquids, general safe swallow strategies recommended. Continue to follow with GI. No further SLP services warranted at this time. Should an increase in dysphagia or signs of aspiration occur, please re order SLP.     Barriers to return to prior living situation: None per SLP  Recommendations for discharge: Home  Rationale for recommendations: No additional SLP services warranted at this time.        Entered by: Mela Conner 12/07/2018 3:10 PM

## 2018-12-07 NOTE — PROGRESS NOTES
12/07/18 1400   General Information   Onset Date 12/06/18   Start of Care Date 12/07/18   Referring Physician Maryana Johnson APRN CNP   Patient/Family Goals Statement None stated    Swallowing Evaluation Bedside swallow evaluation   Behaviorial Observations WFL (within functional limits)   Mode of current nutrition Oral diet   Type of oral diet Regular;Thin liquid   Respiratory Status Room air   Comments Crescencio Moncada is a 33 year old male who was recently diagnosed with eosinophilic esophagitis who presents with bilateral upper and lower extremity numbness and weakness that started yesterday morning. Pt completed a video swallow at [facility] 11/26/18 which was negative for abnormalities. No oropharyngeal dysphagia, no aspriation. An esophagram was completed at the same time reporting - Mild concentric indentations along the entire esophagus may, Schatzki's ring, and a mild sliding hiatal hernia. Pt followed up with an upper GI on 10/27/18. Pt feels that his swallowing is slighlty improved since his barium swallow a few weeks prior. He also reports that his swallowing difficulties primarily occur in the absence of PO intake and often happen with only his saliva. He has also noted an increase in difficutly when laying down - pt does take PPI and has follow up appointment with GI.    Clinical Swallow Evaluation   Oral Musculature generally intact   Structural Abnormalities none present   Dentition present and adequate   Mucosal Quality adequate   Oral Labial Strength and Mobility WFL   Lingual Strength and Mobility WFL   Velar Elevation intact   Laryngeal Function Cough;Swallow;Voicing initiated   Additional Documentation Yes   Swallow Eval   Feeding Assistance no assistance needed   Clinical Swallow Eval: Thin Liquid Texture Trial   Mode of Presentation, Thin Liquids cup;straw;self-fed   Volume of Liquid or Food Presented 2 oz    Oral Phase of Swallow WFL   Pharyngeal Phase of Swallow intact   Diagnostic  Statement No s/sx of aspiration    Clinical Swallow Eval: Puree Solid Texture Trial   Mode of Presentation, Puree cup;self-fed   Volume of Puree Presented 2 oz    Oral Phase, Puree WFL   Pharyngeal Phase, Puree intact   Diagnostic Statement Aduquate oral manipulation. No s/sx of aspiration. Pt denies any difficulty    Clinical Swallow Eval: Solid Food Texture Trial   Mode of Presentation, Solid self-fed   Volume of Solid Food Presented 1/2 cracker    Oral Phase, Solid WFL   Pharyngeal Phase, Solid intact   Diagnostic Statement Adequate oral manipulation, no s/sx of aspiration, pt denies difficulty    Esophageal Phase of Swallow   Patient reports or presents with symptoms of esophageal dysphagia Yes   Esophageal comments Esophagram and upper GI recently completed. Pt has follow up appointment with GI.    Swallow Eval: Clinical Impressions   Skilled Criteria for Therapy Intervention No problems identified which require skilled intervention   Functional Assessment Scale (FAS) 6   Treatment Diagnosis No appreciable dysphagia with foods or liquids.    Diet texture recommendations Regular diet;Thin liquids   Recommended Feeding/Eating Techniques alternate between small bites and sips of food/liquid;maintain upright posture during/after eating for 30 mins;no straws;small sips/bites   Predicted Duration of Therapy Intervention (days/wks) No tx warranted at this time    Anticipated Discharge Disposition home   Risks and Benefits of Treatment have been explained. Yes   Patient, family and/or staff in agreement with Plan of Care Yes   Clinical Impression Comments Pt seen for bedside swallow evaluation. Recently seen by SLP and GI during admit at [facility] the end of October. Pt completed video swallow, esophagram and upper GI at this time. He feels his swallowing is slightly improved at this time, and reports no difficulties eating and drinking he most often notices trouble intermittently with his saliva and when laying down. He nor  his wife have noted any correlations to food items, positions or contextual factors that seem to increase his difficulty swallowing . Pt is taking PPI medication and has planned follow up session with GI. Pt was assessed with thin liquids, puree, and regular solids. Oral masitcation, bolus formation and oral clearance is unremarkable. No overt s/sx of aspiration, no changes in breath sounds or vocal quality. Single swallows per bolus. Pt denies any pain or globus sensation during evaluation. Continue regular solids and thin liquids, general safe swallow strategies recommended. Continue to follow with GI.    Total Evaluation Time   Total Evaluation Time (Minutes) 10

## 2018-12-07 NOTE — PLAN OF CARE
Problem: Patient Care Overview  Goal: Plan of Care/Patient Progress Review  OT: Orders rec'd and chart reviewed. Attempted to see pt for eval; pt currently out of room receiving MRI.

## 2018-12-07 NOTE — PLAN OF CARE
Problem: Guillain-Cairo Syndrome (Adult)  Goal: Signs and Symptoms of Listed Potential Problems Will be Absent, Minimized or Managed (Guillain-Cairo Syndrome)  Signs and symptoms of listed potential problems will be absent, minimized or managed by discharge/transition of care (reference Guillain-Cairo Syndrome (Adult) CPG).  Outcome: Improving  A&Ox4. Neuros: numbness to bilat feet, pt feels R side has improved, neuros otherwise intact. LP site to back CDI. VSS on RA, NIFs stable . Tele SB. Reg diet. Up with SBA, GB. Denies pain. Plan for MRI spine, neuro to see.

## 2018-12-07 NOTE — CONSULTS
[facility]    Neurology Consultation Note     Crescencio Moncada MRN# [id number]   YOB: 1985 Age: 33 year old    Code Status:Full Code   Date of Admission: 12/6/2018  Date of Consult: 12/07/2018    _________________________________   Primary Care Physician   Apryl Correia      ______________________________________________         Assessment & Plan     Reason for consult: I was asked by Dr. Ybarra to evaluate this patient for bilateral lower extremity weakness        ______________________________________________  #. (R20.0,  R20.2) Numbness and tingling of both feet  (primary encounter diagnosis)  --MRI brain unremarkable  --patient has reflexes, which is not what was reported  --LP unremarkable  ------GBS not high on differential currently, NIFs normal - ok to stop  --MRI thoracic and lumbar spine unremarkable  --MRI cervical spine with small syrinx, likely postinfectious or inflammatory response, subtle cervical nerve root enhancement  -----ESR/CRP elevated  -----viral studies still pending  ---will start oral prednisone taper, 60 mg x1 day, 40 mg x1 day, 20 mg x1 day, 10 mg x1 day  --may need EMG testing, but too soon currently - should be done as outpatient in about 2 weeks  #. (R13.10) Dysphagia, unspecified type  --has had outpatient workup  --swallow study by speech pathology - dysphagia thought related to esophagitis  #. DVT Prophylaxis  --per primary service  #. PT/OT/Speech  --Start evaluations  #. Nutrition / GI Prophylaxis  --Per recommendations of speech therapy      #. Code Status: Full Code      Chief Complaint   ______________________________________________  Bilateral upper and lower extremity numbness and weakness  History is obtained from the patient and electronic health record    History of Present Illness   ______________________________________________  Crescencio LON Moncada is a 33 year old male who presented with bilateral lower and upper extremity numbness and  weakness that started the day prior. He wsa recently on a trip and started having some trouble swallowing and some constipation which were unusual for him, then the day prior to admission he noted some numbness in his fingers and later in his feet bilaterally. He had one episode of blurred vision that resolved rapidly. He presented to [facility] ER, where brain MRI was negative and LP was completed which was also negative. The ER physician reported that patient was areflexic and suspected Guillain [place], transferred patient to [facility]. I was consulted prior to transfer and recommended spinal MRIs due to normal protein on LP.    On exam today, patient has improved. He reports numbness in his feet has gone to just numbness in the left foot. Numbness in upper extremities is resolved. He still has mild trouble swallowing. He reports he has had episodes of brief stabbing pain in his right eye that started last night, otherwise no new symptoms. He has full reflexes at his knees bilaterally, minimal responses at ankles, and Babinski is unresponsive bilaterally but patient has very ticklish feet. Upper extremities completely intact. No lightheadedness or dizziness currently, but notes he has had intermittent episodes of dizziness in the last couple of years. No headache.     Past Medical History    ______________________________________________  No past medical history on file.  Past Surgical History   ______________________________________________  Past Surgical History:   Procedure Laterality Date     COLONOSCOPY WITH CO2 INSUFFLATION N/A 9/11/2017    Procedure: COLONOSCOPY WITH CO2 INSUFFLATION;  EGD, Colonoscopy, Dr Correia referring, BMI 28.11, [facility] fax; [phone];  Surgeon: Amber Lowry MD;  Location: [facility] OR     COMBINED ESOPHAGOSCOPY, GASTROSCOPY, DUODENOSCOPY (EGD) WITH CO2 INSUFFLATION N/A 9/11/2017    Procedure: COMBINED ESOPHAGOSCOPY, GASTROSCOPY, DUODENOSCOPY (EGD) WITH CO2 INSUFFLATION;  EGD,  Colonoscopy, Dr Correia referring, BMI 28.11, [facility] fax; [phone];  Surgeon: Amber Lowry MD;  Location: [facility] OR     ESOPHAGOSCOPY, GASTROSCOPY, DUODENOSCOPY (EGD), COMBINED N/A 9/11/2017    Procedure: COMBINED ESOPHAGOSCOPY, GASTROSCOPY, DUODENOSCOPY (EGD), BIOPSY SINGLE OR MULTIPLE;;  Surgeon: Amber Lowry MD;  Location: [facility] OR     ESOPHAGOSCOPY, GASTROSCOPY, DUODENOSCOPY (EGD), COMBINED N/A 11/27/2018    Procedure: COMBINED ESOPHAGOSCOPY, GASTROSCOPY, DUODENOSCOPY (EGD), BIOPSY SINGLE OR MULTIPLE;  Surgeon: Reji Andujar MD;  Location: [facility] GI     Prior to Admission Medications   ______________________________________________  Prior to Admission Medications   Prescriptions Last Dose Informant Patient Reported? Taking?   pantoprazole (PROTONIX) 40 MG EC tablet 12/6/2018 at AM  No Yes   Sig: Take 1 tablet (40 mg) by mouth daily Take 30-60 minutes before a meal.      Facility-Administered Medications: None     Allergies   No Known Allergies    Social History   ______________________________________________  Social History     Social History     Marital status:      Spouse name: N/A     Number of children: N/A     Years of education: N/A     Social History Main Topics     Smoking status: Former Smoker     Types: Dip, chew, snus or snuff     Quit date: 11/20/2017     Smokeless tobacco: Former User     Types: Chew      Comment: every 3 days     Alcohol use Yes      Comment: 4-5 beers/day, sometimes more on the weekend     Drug use: No     Sexual activity: Yes     Partners: Female     Other Topics Concern     Not on file     Social History Narrative       Family History   ______________________________________________  Family History   Problem Relation Age of Onset     Myocardial Infarction Paternal Grandfather 46     Genetic Disorder Mother      spinal muscular atrophy gene     Genetic Disorder Sister 0     spinal muscular atrophy     Coronary Artery Disease Paternal Half-Brother         Review of Systems   ______________________________________________  A comprehensive review of  10 systems was performed and found to be negative except as described in this note  CONSTITUTIONAL: negative for fever, chills, change in weight  INTEGUMENTARY/SKIN: no rash or obvious new lesions  ENT/MOUTH: no sore throat, new sinus pain or nasal drainage, no neck mass noted  RESP: No pleuretic pain, No cough, no hemoptysis, No SOB   CV: negative for chest pain, palpitations or peripheral edema  GI: no nausea, vomiting, change in stools  : no dysuria or hematuria  MUSCULOSKELETAL: no myalgias, arthralgias or join efffusion  ENDOCRINE: no history of polyuria, polydyspsia or symptoms of thyroid dysfunction  PSYCHIATRIC: no change in mood stable  LYMPHATIC: no new lymphadenopathy  HEME: no bleeding or easy bruisability  NEURO: see HPI    Physical Exam   ______________________________________________  Weight:0 lbs 0 oz; Height:Data Unavailable  Temp: 98.2  F (36.8  C) Temp src: Oral BP: 102/62 Pulse: 64   Resp: 14 SpO2: 97 % O2 Device: None (Room air)    General Appearance:  No acute distress  Neuro:       Mental Status Exam:   Awake, alert, oriented X3. Speech and language are intact. Mental status is normal       Cranial Nerves:  Pupils 3 mm, reactive. EOMI. Face sensation is normal. Face is symmetric. Tongue and uvula are midline. Other CN are normal           Motor:  5/5 X 4. Tone and bulk are normal           Reflexes:  Normal DTR. Toes downgoing.        Sensory:  Decreased to light touch and pinprick on bottom and lateral side of left foot, otherwise intact            Coordination:   Intact finger-to-nose        Gait:  No significant difficulties  Neck: no nuchal rigidity, normal thyroid. No carotid bruits.    Cardiovascular: Regular rate and rhythm, no m/r/g  Lungs: Clear to auscultation  Abdomen: Soft, not tender, not distended  Extremities: No clubbing, no cyanosis, no edema    Data    ______________________________________________  All Data personally reviewed:       Labs:   CBC RESULTS:     Recent Labs  Lab 12/07/18  0825 12/06/18  1110   WBC 5.1 4.9   RBC 4.74 4.99   HGB 14.5 15.1   HCT 41.6 43.6    280     Basic Metabolic Panel:   Recent Labs   Lab Test  12/07/18   0825  12/06/18   1110  11/26/18   0444   NA  141  141  142   POTASSIUM  3.9  3.9  3.9   CHLORIDE  110*  108  108   CO2  26  26  27   BUN  11  13  14   CR  0.89  0.90  0.97   GLC  87  102*  87   LEATHA  8.6  8.7  8.8     Liver panel:  Recent Labs   Lab Test  12/07/18   0825  12/06/18   1110  11/26/18   0444  11/24/18   1838  08/16/17   0902   PROTTOTAL  6.7*  7.5  6.4*  6.4*  6.6*   ALBUMIN  3.3*  3.6  3.6  3.8  3.8   BILITOTAL  0.5  0.4  0.6  0.4  0.6   ALKPHOS  50  54  61  65  65   AST  13  16  38  45  16   ALT  25  29  60  64  23     Lipid Profile:  Recent Labs   Lab Test  12/16/16   1423   CHOL  190   HDL  44   LDL  131*   TRIG  73     A1C:   Recent Labs   Lab Test  12/16/16   1423   A1C  5.1     CK:   Recent Labs   Lab Test  12/06/18   1110  11/24/18   1838   CKT  57  85        CRP inflammation:   Recent Labs   Lab Test  12/06/18   1110  11/25/18   1857  11/24/18   1838  06/26/17   0926   CRP  46.2*  <2.9  <2.9  <2.9     ESR:   Recent Labs   Lab Test  12/06/18   1110  11/24/18   1838  06/26/17   0926   SED  16*  3  3       UA Results:  Recent Labs   Lab Test  11/25/18   1857   COLOR  Yellow   APPEARANCE  Clear   URINEGLC  Negative   URINEBILI  Negative   URINEKETONE  Negative   SG  1.011   UBLD  Negative   URINEPH  7.0   PROTEIN  Negative   NITRITE  Negative   LEUKEST  Small*   RBCU  0   WBCU  2          Cardiac US:   --       Neurophysiology:   --       Imaging:   All imaging studies were reviewed personally  MRI brain 12/6/18:   Normal brain MRI.    MRI cervical spine 12/7/18:  1. Thin (1 mm) central cord fluid signal at the level of C1 over a 7 mm segment, consistent with small syrinx.  2. Multilevel subtle cervical  nerve root enhancement which can be seen in the setting of postinfectious/autoimmune/inflammatory polyneuropathy.    MRI thoracic spine 12/7/18:  Unremarkable MRI of the thoracic spine with and without contrast.    MRI lumbar spine 12/7/18:  1. Mild or early degenerative disc disease in the lower lumbar spine. This may be associated with mild discogenic endplate reactive marrow edema at L5-S1.  2. No lumbar disc herniation, stenosis, or apparent neural Impingement.      Text Page    Maryana Johnson, MSN, FNP-BC, RN CNRN SCRN

## 2018-12-08 VITALS
DIASTOLIC BLOOD PRESSURE: 73 MMHG | HEART RATE: 61 BPM | OXYGEN SATURATION: 94 % | TEMPERATURE: 97.8 F | RESPIRATION RATE: 16 BRPM | SYSTOLIC BLOOD PRESSURE: 122 MMHG

## 2018-12-08 LAB
B BURGDOR IGG CSF QL IB: NEGATIVE
B BURGDOR IGM CSF QL IB: NEGATIVE
HTLV I+II AB PATRN SER IB-IMP: NEGATIVE

## 2018-12-08 PROCEDURE — 25000132 ZZH RX MED GY IP 250 OP 250 PS 637: Performed by: INTERNAL MEDICINE

## 2018-12-08 PROCEDURE — 84110 ASSAY OF PORPHOBILINOGEN: CPT | Performed by: INTERNAL MEDICINE

## 2018-12-08 PROCEDURE — 99239 HOSP IP/OBS DSCHRG MGMT >30: CPT | Performed by: HOSPITALIST

## 2018-12-08 PROCEDURE — 25000125 ZZHC RX 250: Performed by: NURSE PRACTITIONER

## 2018-12-08 PROCEDURE — 99207 ZZC CDG-CHARGE REQUIRED MANUAL ENTRY: CPT | Performed by: HOSPITALIST

## 2018-12-08 PROCEDURE — 84120 ASSAY OF URINE PORPHYRINS: CPT | Performed by: INTERNAL MEDICINE

## 2018-12-08 RX ORDER — PREDNISONE 10 MG/1
TABLET ORAL
Qty: 3 TABLET | Refills: 0 | Status: SHIPPED | OUTPATIENT
Start: 2018-12-08 | End: 2019-02-11

## 2018-12-08 RX ADMIN — PENICILLIN V POTASSIUM 500 MG: 500 TABLET, FILM COATED ORAL at 08:23

## 2018-12-08 RX ADMIN — PANTOPRAZOLE SODIUM 40 MG: 40 TABLET, DELAYED RELEASE ORAL at 06:31

## 2018-12-08 RX ADMIN — PREDNISONE 40 MG: 20 TABLET ORAL at 08:23

## 2018-12-08 ASSESSMENT — ACTIVITIES OF DAILY LIVING (ADL)
ADLS_ACUITY_SCORE: 7

## 2018-12-08 NOTE — PLAN OF CARE
Problem: Patient Care Overview  Goal: Plan of Care/Patient Progress Review  Outcome: Improving  Neuro exam intact except for waxing and waning numbness to bilateral feet L>R.  Patient reports worsening of numbness and generalized fatigue with ambulation/activity, neurology notified.  Started on oral prednisone, verbal and written education given to patient.  Plan for discharge to home in AM.

## 2018-12-08 NOTE — PLAN OF CARE
Problem: Patient Care Overview  Goal: Plan of Care/Patient Progress Review  Outcome: Adequate for Discharge Date Met: 12/08/18  Neuro exam intact, up ambulating independently in ken.  Tolerating regular diet, no side effects from Prednisone noted.  Plan to have EEG in 10 days (two weeks after symptom onset) follow up with PCP and neurology.  Discharge instructions reviewed with patient and spouse.  All questions answered.  Discharged to home.

## 2018-12-08 NOTE — PROVIDER NOTIFICATION
Call placed to neurology to see if patient needs to be seen prior to discharge.    Spoke to Dr. Irwin from neurology.  Patient can discharge if he would like prior to neurology seeing this afternoon. Patient should have an EMG 2 weeks after symptom onset (approximately 10 days from now) and follow up with [facility] around the time of EMG.  MD will be in this afternoon and round on patient if he is still here.

## 2018-12-08 NOTE — PROVIDER NOTIFICATION
"Call placed to Dr. Bay: \"FYI: Patient would like to be discharged. I spoke with neuro who state he doesn't have to wait to be seen by them. His neuro exam is intact. Thanks!\"  "

## 2018-12-08 NOTE — DISCHARGE INSTRUCTIONS
PLEASE MAKE APPOINTMENT FOR AN EMG AT [facility] IN 10 DAYS TELL THEM YOU WERE REFERRED BY CAMMIE BALLARD NP    MAKE AN APPOINTMENT WITH A NEUROLOGIST TO GO OVER EMG RESULTS, THIS SHOULD HAPPEN SHORTLY AFTER EMG IS COMPLETED (WITHIN THE NEXT TWO WEEKS) TELL THEM THIS WHEN YOU MAKE APPOINTMENT        Please follow up with neurology in around the time EMG.  You may call any of the following neurology clinics for an appointment or a clinic of your choice. Tell them you were recently hospitalized and need follow up as recommended at discharge.    1. [facility]   [address] [place] [address], [address]   [place] [address]   [phone]  2. [facility]   [address], [address]   [place], [place] [address] [phone]  3. [facility]   Alexander Scanlon MD   [address] [place], [place] [id number] [phone]  4. [facility] - Yolis Scanlon MD   [address]   [place], [place] [id number] [phone]

## 2018-12-08 NOTE — PLAN OF CARE
Problem: Patient Care Overview  Goal: Plan of Care/Patient Progress Review  Outcome: No Change  A&Ox4. Neuros intact. Denies numbness in feet overnight.VSS. Regular diet. Up independently. Denies pain. Pending discharge home today.

## 2018-12-09 LAB
RHEUMATOID FACT SER NEPH-ACNC: <20 IU/ML (ref 0–20)
VIT B1 BLD-MCNC: 121 NMOL/L (ref 70–180)

## 2018-12-10 ENCOUNTER — TELEPHONE (OUTPATIENT)
Dept: FAMILY MEDICINE | Facility: OTHER | Age: 33
End: 2018-12-10

## 2018-12-10 LAB — B BURGDOR IGG+IGM SER QL: 0.09 (ref 0–0.89)

## 2018-12-10 NOTE — DISCHARGE SUMMARY
Admit Date:     12/06/2018   Discharge Date:     12/08/2018      PRIMARY CARE PROVIDER:  Apryl Correia CNP      DISCHARGE DIAGNOSES:   1.  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   2.  Recent history of streptococcal throat.   3.  History of eosinophilic esophagitis.      SIGNIFICANT TESTS DONE IN HOSPITAL:  Included:     1.  Labs which revealed elevated ESR, CRP.     2.  Lumbar puncture and CSF evaluation was unremarkable.     3.  MRI of the brain and cervical, thoracic and lumbar spine was significant for thin central cord fluid signal at the level of C1 over a 7 mm segment consistent with a small syrinx and multilevel subtle cervical nerve root enhancements which can be seen in the setting of post-infectious, autoimmune and inflammatory polyneuropathy.  Thoracic lumber and brain MRI were otherwise negative for any significant abnormalities.   4.  Lab workup including Lyme disease, KYRIE, rheumatoid factor, vitamin B1 and porphyrin pending at the time of discharge, needs followup.   5.  CMP, HIV, folate, CK, TSH, CBC were normal, and Lyme IgG in CSF was negative.      HOSPITAL COURSE:  Johan Moncada is a 33-year-old pleasant male with medical history significant for recent strep throat infection, also with ongoing dysphagia for which he was evaluated with upper endoscopy and was diagnosed with eosinophilic esophagitis, developed bilateral upper and lower extremity numbness and weakness.  He was initially evaluated in [facility] ER and felt to have absence of deep tendon reflexes, and there was concern for GB syndrome.  He was transferred to [facility] for further neurological evaluation.  Above workup was done, with a significant finding of possible syrinx in C1 and also polyneuropathy involving multiple cervical cord nerve roots.  The patient's symptoms were relapsing and remitting.  Neurology was consulted,  and he was started on tapering prednisone.  His symptoms have resolved at the time of discharge.  It was suspected postinfectious in the setting of recent strep throat. The patient is still on antibiotic course for his strep throat, which he will complete at the time of discharge.        Extensive lab workup was done as above, and there are pending labs that will be followed by Neurology and PCP.      PHYSICAL EXAMINATION ON DAY OF DISCHARGE:   GENERAL:  Alert, awake, oriented, appears comfortable.   VITAL SIGNS:  Blood pressure 122/73, pulse 61, respirations 16, pulse ox 94 on room air.   HEENT:  PERRL, EOMI.  Oral mucosa moist.   NECK:  Supple.   LUNGS:  Bilateral equal air entry, clear to auscultation.  Normal work of breathing.  Remains on room air.   CARDIOVASCULAR:  S1, S2, regular.  No murmur, rub, gallop.   ABDOMEN:  Soft, nontender, bowel sounds active.   MUSCULOSKELETAL:  No edema.   NEUROLOGIC:  Alert and oriented x 3 and appropriate.  Cranial nerves II-XII intact.  Motor strength equal and symmetrical.  Deep tendon reflexes are present and normal.   No sensory deficit.      DISCHARGE MEDICATIONS:  Included:   1.  Prednisone 20 mg p.o. on  and 10 mg p.o. on 12/10 and stop.   2.  Protonix 40 mg p.o. daily.     3.  He will continue and complete a course of penicillin V that was prescribed.        DISCHARGE PLAN:  The patient was discharged home.  He will follow up with Neurology for consideration of EMG if symptoms persist or recur.      Time spent on day of discharge was more than 35 minutes.         NITIN LANDEROS MD             D: 2018   T: 2018   MT: JAKE      Name:     PAOLA CORREA   MRN:      [id number]        Account:        [id number]   :      1985           Admit Date:     2018                                  Discharge Date: 2018      Document: [id number]       cc: JOSHUA ASENCIO CNP

## 2018-12-10 NOTE — TELEPHONE ENCOUNTER
ED / Discharge Outreach Protocol    Patient Contact    Attempt # 1    Was call answered?  No.  Left message on voicemail with information to call me back.    Cliff Molina, RN, BSN

## 2018-12-10 NOTE — TELEPHONE ENCOUNTER
Reason for follow up call: Crescencio Moncada appeared on our list for being seen in and recenlty discharge from the Hospital.    Chief Complaint   Patient presents with     Hospital F/U     Numbness And Tingling Of Both Feet       Encounter routed for Clinic RN to call for follow up

## 2018-12-11 LAB
ANA SER QL IF: NEGATIVE
BACTERIA SPEC CULT: NO GROWTH
SPECIMEN SOURCE: NORMAL

## 2018-12-11 NOTE — TELEPHONE ENCOUNTER
ED / Discharge Outreach Protocol    Patient Contact    Attempt # 2    Was call answered?  No.  Left message on voicemail with information to call me back.    Nandini Bae, RN, BSN

## 2018-12-11 NOTE — RESULT ENCOUNTER NOTE
Final CSF culture is NEGATIVE.    No treatment or change in treatment per [facility] ED Lab Result protocol.

## 2018-12-12 LAB
COLLECT DURATION TIME SPEC: 410 H
COPRO1/CREAT UR-SRTO: 1 UMOL/MOL CRT (ref 0–6)
COPRO3/CREAT UR-SRTO: 4 UMOL/MOL CRT (ref 0–14)
CREAT 24H UR-MCNC: 236 MG/DL
CREAT 24H UR-MRATE: NORMAL MG/D (ref 1000–2500)
HEPTACARBOXYLATE/CREAT UR-SRTO: 0 UMOL/MOL CRT (ref 0–2)
PBG 24H UR-SRATE: NORMAL UMOL/D (ref 0–11)
PBG UR-SCNC: 6.9 UMOL/L (ref 0–8.8)
PORPHYRIN FRACT 24H UR-IMP: NEGATIVE
SPECIMEN VOL ?TM UR: NORMAL ML
UROPOR/CREAT UR-SRTO: 0 UMOL/MOL CRT (ref 0–4)

## 2018-12-18 ENCOUNTER — TELEPHONE (OUTPATIENT)
Dept: OTOLARYNGOLOGY | Facility: CLINIC | Age: 33
End: 2018-12-18

## 2018-12-18 NOTE — TELEPHONE ENCOUNTER
Left a vm with [facility] phone listed for pt just does a loud beep will not ring reason for call: Referred by Margarita Colon Dx dysphagia concern for VCD  Please schedule in [facility] first available isn't until 02/20/2019 we can add pt to waitlist.

## 2019-01-22 ENCOUNTER — TELEPHONE (OUTPATIENT)
Dept: FAMILY MEDICINE | Facility: OTHER | Age: 34
End: 2019-01-22

## 2019-01-22 NOTE — TELEPHONE ENCOUNTER
You placed a referral to dermatology/multiple locations on 11/30/18.    It is unclear if the patient has scheduled yet, not finding a report showing they were seen.     Please forward to your team if further follow up is needed to see if they have made this appointment.      Thank you!   Kaitlin/Referral Representative for [facility]

## 2019-02-11 ENCOUNTER — OFFICE VISIT (OUTPATIENT)
Dept: FAMILY MEDICINE | Facility: OTHER | Age: 34
End: 2019-02-11
Payer: COMMERCIAL

## 2019-02-11 VITALS
HEIGHT: 72 IN | TEMPERATURE: 98.8 F | DIASTOLIC BLOOD PRESSURE: 62 MMHG | SYSTOLIC BLOOD PRESSURE: 120 MMHG | BODY MASS INDEX: 29.88 KG/M2 | WEIGHT: 220.6 LBS | HEART RATE: 72 BPM | RESPIRATION RATE: 12 BRPM

## 2019-02-11 DIAGNOSIS — D17.30 LIPOMA OF SKIN AND SUBCUTANEOUS TISSUE: Primary | ICD-10-CM

## 2019-02-11 PROCEDURE — 99213 OFFICE O/P EST LOW 20 MIN: CPT | Performed by: STUDENT IN AN ORGANIZED HEALTH CARE EDUCATION/TRAINING PROGRAM

## 2019-02-11 ASSESSMENT — MIFFLIN-ST. JEOR: SCORE: 1976.89

## 2019-02-11 NOTE — PROGRESS NOTES
SUBJECTIVE:   Crescencio Moncada is a 33 year old male who presents to clinic today for the following health issues:    HPI       Concern - Lump on abdomen  Onset: a couple weeks ago his left abdomen hurt a little so he was rubbing it with his hand and found the lump    Description:   Lump on left side of abdomen    Intensity: mild    Progression of Symptoms:  same    Accompanying Signs & Symptoms:  Little uncomfortable when he rubs the lump    Previous history of similar problem:   None    Precipitating factors:   Worsened by: n/a    Alleviating factors:  Improved by: n/a    Therapies Tried and outcome: None    Problem list and histories reviewed & adjusted, as indicated.  Additional history: as documented    Patient Active Problem List   Diagnosis     Injury of right hand, initial encounter     Pain of right hand     Need for diphtheria-tetanus-pertussis (Tdap) vaccine, adult/adolescent     Epigastric pain     Liver lesion     Syncope, unspecified syncope type     Spasm     Eosinophilic esophagitis     Esophageal dysphagia     Weakness of both legs     Past Surgical History:   Procedure Laterality Date     COLONOSCOPY WITH CO2 INSUFFLATION N/A 9/11/2017    Procedure: COLONOSCOPY WITH CO2 INSUFFLATION;  EGD, Colonoscopy, Dr Correia referring, BMI 28.11, [facility] fax; [phone];  Surgeon: Amber Lowry MD;  Location: [facility] OR     COMBINED ESOPHAGOSCOPY, GASTROSCOPY, DUODENOSCOPY (EGD) WITH CO2 INSUFFLATION N/A 9/11/2017    Procedure: COMBINED ESOPHAGOSCOPY, GASTROSCOPY, DUODENOSCOPY (EGD) WITH CO2 INSUFFLATION;  EGD, Colonoscopy, Dr Correia referring, BMI 28.11, [facility] fax; [phone];  Surgeon: Amber Lowry MD;  Location: [facility] OR     ESOPHAGOSCOPY, GASTROSCOPY, DUODENOSCOPY (EGD), COMBINED N/A 9/11/2017    Procedure: COMBINED ESOPHAGOSCOPY, GASTROSCOPY, DUODENOSCOPY (EGD), BIOPSY SINGLE OR MULTIPLE;;  Surgeon: Amber Lowry MD;  Location: [facility] OR     ESOPHAGOSCOPY, GASTROSCOPY,  "DUODENOSCOPY (EGD), COMBINED N/A 2018    Procedure: COMBINED ESOPHAGOSCOPY, GASTROSCOPY, DUODENOSCOPY (EGD), BIOPSY SINGLE OR MULTIPLE;  Surgeon: Reji Andujar MD;  Location:  GI       Social History     Tobacco Use     Smoking status: Former Smoker     Types: Dip, chew, snus or snuff     Last attempt to quit: 2017     Years since quittin.2     Smokeless tobacco: Former User     Types: Chew     Tobacco comment: every 3 days   Substance Use Topics     Alcohol use: Yes     Comment: 4-5 beers/day, sometimes more on the weekend     Family History   Problem Relation Age of Onset     Myocardial Infarction Paternal Grandfather 46     Genetic Disorder Mother         spinal muscular atrophy gene     Genetic Disorder Sister 0        spinal muscular atrophy     Coronary Artery Disease Paternal Half-Brother          No current outpatient medications on file.     No Known Allergies  BP Readings from Last 3 Encounters:   19 120/62   18 122/73   18 128/80    Wt Readings from Last 3 Encounters:   19 100.1 kg (220 lb 9.6 oz)   18 98 kg (216 lb)   18 99.7 kg (219 lb 14.4 oz)                  Labs reviewed in EPIC    ROS:  Constitutional, HEENT, cardiovascular, pulmonary, gi and gu systems are negative, except as otherwise noted.    OBJECTIVE:     /62   Pulse 72   Temp 98.8  F (37.1  C) (Temporal)   Resp 12   Ht 1.834 m (6' 0.21\")   Wt 100.1 kg (220 lb 9.6 oz)   BMI 29.75 kg/m    Body mass index is 29.75 kg/m .  GENERAL APPEARANCE: healthy, alert and no distress  ABDOMEN: soft, nontender, without hepatosplenomegaly or masses  SKIN: small, approximately 5 mm oval rubbery mass palpable while standing on LUQ, no overlying lesions or rashes    Diagnostic Test Results:  none     ASSESSMENT/PLAN:     1. Lipoma of skin and subcutaneous tissue  The mass appears to be a lipoma. I reassured him of the benign nature of this and gave him written information. Continue to " monitor and if it grows rapidly or changes in any way, follow up in clinic.    MAGALIS Ortega [facility]

## 2019-02-11 NOTE — PATIENT INSTRUCTIONS
Patient Education     Understanding a Lipoma    A lipoma is a benign lump under the skin that s made of fat. It s not cancer. It feels soft like rubber when you press it, and in most cases it doesn t hurt. Some people have more than one. A lipoma grows slowly over time and doesn t cause many problems. Lipomas occur most often in adults from ages 40 to 60, and more often in men.  How to say it  [id number]   What causes a lipoma?  The cause is not yet known. Experts are still learning more. It may be partly caused by a problem in a gene. They can run in families. Familial multiple lipomatosis is when 2 or more family members have many lipomas.  Symptoms of a lipoma  The main symptom of a lipoma is a soft lump under the skin that doesn t hurt unless it is pressing on a nerve. It may be small, around 1/4 inch across. Or it may be larger, up to 4 inches across or more.  There are different kinds of lipomas. The most common kind occurs under the skin of the shoulders, chest, back, belly, or under the arms. In some cases, a lipoma can occur on the legs. In rare cases, one may occur deeper in the body or in a muscle.  Treatment for a lipoma  In most cases, a lipoma doesn t need treatment. Your healthcare provider may look at it during regular checkups to see if it changes.  But if the lipoma is painful or you want it removed for cosmetic reasons, it can be removed with surgery. The surgery is called excision. The lipoma will most likely not grow back after surgery. During surgery, the area around the lipoma is numbed. If you have a deep lipoma, you may need medicine called regional anesthesia to numb a larger area. Or you may need medicine called general anesthesia to put you to sleep during the procedure. Then the doctor makes a cut over the area of the lipoma. He or she removes the lump of fat. The cut is then closed with stitches.  Possible complications of a lipoma  A large lipoma inside the body can press on organs,  nerves, or other tissues and cause problems. For example, it can cause problems with breathing or digestion.  Living with a lipoma  Your healthcare provider may look at the lipoma during regular checkups to see if it changes or is causing problems.  When to call your healthcare provider  Call your healthcare provider right away if you have any of these:    Lipoma that grows quickly, causes pain, or feels hard    Growth of new lipomas   Date Last Reviewed: 5/1/2016 2000-2018 [employer]. [address] [place], [place] [address]. All rights reserved. This information is not intended as a substitute for professional medical care. Always follow your healthcare professional's instructions.

## 2019-06-20 ENCOUNTER — OFFICE VISIT (OUTPATIENT)
Dept: PEDIATRICS | Facility: CLINIC | Age: 34
End: 2019-06-20
Payer: COMMERCIAL

## 2019-06-20 ENCOUNTER — ANCILLARY PROCEDURE (OUTPATIENT)
Dept: GENERAL RADIOLOGY | Facility: CLINIC | Age: 34
End: 2019-06-20
Attending: INTERNAL MEDICINE
Payer: COMMERCIAL

## 2019-06-20 VITALS
WEIGHT: 219.9 LBS | HEIGHT: 72 IN | TEMPERATURE: 98.4 F | HEART RATE: 67 BPM | OXYGEN SATURATION: 98 % | SYSTOLIC BLOOD PRESSURE: 125 MMHG | DIASTOLIC BLOOD PRESSURE: 78 MMHG | BODY MASS INDEX: 29.78 KG/M2

## 2019-06-20 DIAGNOSIS — R06.02 SOB (SHORTNESS OF BREATH): ICD-10-CM

## 2019-06-20 DIAGNOSIS — L30.9 DERMATITIS: ICD-10-CM

## 2019-06-20 DIAGNOSIS — R05.9 COUGH: ICD-10-CM

## 2019-06-20 DIAGNOSIS — J18.9 PNEUMONIA OF RIGHT LOWER LOBE DUE TO INFECTIOUS ORGANISM: Primary | ICD-10-CM

## 2019-06-20 DIAGNOSIS — J20.9 ACUTE BRONCHITIS, UNSPECIFIED ORGANISM: ICD-10-CM

## 2019-06-20 DIAGNOSIS — Z72.0 TOBACCO USE: ICD-10-CM

## 2019-06-20 LAB
BASOPHILS # BLD AUTO: 0.1 10E9/L (ref 0–0.2)
BASOPHILS NFR BLD AUTO: 0.9 %
DIFFERENTIAL METHOD BLD: NORMAL
EOSINOPHIL # BLD AUTO: 0.4 10E9/L (ref 0–0.7)
EOSINOPHIL NFR BLD AUTO: 5 %
ERYTHROCYTE [DISTWIDTH] IN BLOOD BY AUTOMATED COUNT: 12.2 % (ref 10–15)
HCT VFR BLD AUTO: 42.4 % (ref 40–53)
HGB BLD-MCNC: 15 G/DL (ref 13.3–17.7)
IMM GRANULOCYTES # BLD: 0 10E9/L (ref 0–0.4)
IMM GRANULOCYTES NFR BLD: 0.2 %
LYMPHOCYTES # BLD AUTO: 4.2 10E9/L (ref 0.8–5.3)
LYMPHOCYTES NFR BLD AUTO: 47.6 %
MCH RBC QN AUTO: 30.4 PG (ref 26.5–33)
MCHC RBC AUTO-ENTMCNC: 35.4 G/DL (ref 31.5–36.5)
MCV RBC AUTO: 86 FL (ref 78–100)
MONOCYTES # BLD AUTO: 0.9 10E9/L (ref 0–1.3)
MONOCYTES NFR BLD AUTO: 10.1 %
NEUTROPHILS # BLD AUTO: 3.2 10E9/L (ref 1.6–8.3)
NEUTROPHILS NFR BLD AUTO: 36.2 %
PLATELET # BLD AUTO: 330 10E9/L (ref 150–450)
RBC # BLD AUTO: 4.94 10E12/L (ref 4.4–5.9)
WBC # BLD AUTO: 8.8 10E9/L (ref 4–11)

## 2019-06-20 PROCEDURE — 99214 OFFICE O/P EST MOD 30 MIN: CPT | Performed by: INTERNAL MEDICINE

## 2019-06-20 PROCEDURE — 85025 COMPLETE CBC W/AUTO DIFF WBC: CPT | Performed by: INTERNAL MEDICINE

## 2019-06-20 PROCEDURE — 71046 X-RAY EXAM CHEST 2 VIEWS: CPT | Performed by: RADIOLOGY

## 2019-06-20 PROCEDURE — 36415 COLL VENOUS BLD VENIPUNCTURE: CPT | Performed by: INTERNAL MEDICINE

## 2019-06-20 RX ORDER — LEVOFLOXACIN 500 MG/1
500 TABLET, FILM COATED ORAL DAILY
Qty: 7 TABLET | Refills: 0 | Status: SHIPPED | OUTPATIENT
Start: 2019-06-20 | End: 2019-10-09

## 2019-06-20 RX ORDER — ALBUTEROL SULFATE 90 UG/1
2 AEROSOL, METERED RESPIRATORY (INHALATION) EVERY 4 HOURS PRN
Qty: 1 INHALER | Refills: 0 | Status: SHIPPED | OUTPATIENT
Start: 2019-06-20 | End: 2019-10-09

## 2019-06-20 ASSESSMENT — MIFFLIN-ST. JEOR: SCORE: 1973.71

## 2019-06-20 NOTE — PROGRESS NOTES
URI    Stomach concern    Crescencio Moncada is a 33 year old male who presents to clinic today for the following health issues:    HPI     Patient very young man comes in complaining of a persistent cough and some shortness of breath.  He started out with a head cold symptoms about 10 days ago.  He had pressure in the head.  That symptom seems to be gradually getting better.  Later developed cough and it not getting better.  Few days he is also felt short of breath.  He has had no fever or chills.  He does smoke but is cutting back.  He has not noticed wheezing.      Acute Illness   Acute illness concerns: Cough, chest congestion, SOB, wheezing  Onset: 10 days ago    Fever: no     Chills/Sweats: no     Headache (location?): YES    Sinus Pressure:YES    Conjunctivitis:  no    Ear Pain: YES- both feel plugged    Rhinorrhea: YES    Congestion: YES    Sore Throat: no      Cough: YES-started out productive but now non-productive, with shortness of breath    Wheeze: YES    Decreased Appetite: no     Nausea: no     Vomiting: no     Diarrhea:  no     Dysuria/Freq.: no     Fatigue/Achiness: YES    Sick/Strep Exposure: no      Therapies Tried and outcome: Mucinex, tylenol    Acid reflux without heartburn    Duration: over a year    Description (location/character/radiation): Acid reflux?  Patient states he can feel food coming up his esophagus and he has to swallow to get it to go back down, states it sometimes gets stuck in his throat to where he cannot swallow and feels like he is choking.    Intensity:  severe    Accompanying signs and symptoms: none    History (similar episodes/previous evaluation): acid reflux in the past, put on omeprazole but that gives him stomach cramps.    Precipitating or alleviating factors: None    Therapies tried and outcome: None       Patient Active Problem List   Diagnosis     Injury of right hand, initial encounter     Pain of right hand     Need for diphtheria-tetanus-pertussis (Tdap)  vaccine, adult/adolescent     Epigastric pain     Liver lesion     Syncope, unspecified syncope type     Spasm     Eosinophilic esophagitis     Esophageal dysphagia     Weakness of both legs     Tobacco use     Past Surgical History:   Procedure Laterality Date     COLONOSCOPY WITH CO2 INSUFFLATION N/A 2017    Procedure: COLONOSCOPY WITH CO2 INSUFFLATION;  EGD, Colonoscopy, Dr Correia referring, BMI 28.11, [facility] fax; [phone];  Surgeon: Amber Lowry MD;  Location: [facility]     COMBINED ESOPHAGOSCOPY, GASTROSCOPY, DUODENOSCOPY (EGD) WITH CO2 INSUFFLATION N/A 2017    Procedure: COMBINED ESOPHAGOSCOPY, GASTROSCOPY, DUODENOSCOPY (EGD) WITH CO2 INSUFFLATION;  EGD, Colonoscopy, Dr Correia referring, BMI 28.11, [facility] fax; [phone];  Surgeon: Amber Lowry MD;  Location: [facility]     ESOPHAGOSCOPY, GASTROSCOPY, DUODENOSCOPY (EGD), COMBINED N/A 2017    Procedure: COMBINED ESOPHAGOSCOPY, GASTROSCOPY, DUODENOSCOPY (EGD), BIOPSY SINGLE OR MULTIPLE;;  Surgeon: Amber Lowry MD;  Location: [facility]     ESOPHAGOSCOPY, GASTROSCOPY, DUODENOSCOPY (EGD), COMBINED N/A 2018    Procedure: COMBINED ESOPHAGOSCOPY, GASTROSCOPY, DUODENOSCOPY (EGD), BIOPSY SINGLE OR MULTIPLE;  Surgeon: Reji Andujar MD;  Location:  [facility]       Social History     Tobacco Use     Smoking status: Former Smoker     Types: Dip, chew, snus or snuff     Last attempt to quit: 2017     Years since quittin.5     Smokeless tobacco: Former User     Types: Chew     Tobacco comment: every 3 days   Substance Use Topics     Alcohol use: Yes     Comment: 4-5 beers/day, sometimes more on the weekend     Family History   Problem Relation Age of Onset     Myocardial Infarction Paternal Grandfather 46     Genetic Disorder Mother         spinal muscular atrophy gene     Genetic Disorder Sister 0        spinal muscular atrophy     Coronary Artery Disease Paternal Half-Brother          Current Outpatient  "Medications   Medication Sig Dispense Refill     albuterol (PROAIR HFA/PROVENTIL HFA/VENTOLIN HFA) 108 (90 Base) MCG/ACT inhaler Inhale 2 puffs into the lungs every 4 hours as needed for shortness of breath / dyspnea or wheezing 1 Inhaler 0     levofloxacin (LEVAQUIN) 500 MG tablet Take 1 tablet (500 mg) by mouth daily for 7 days 7 tablet 0     No Known Allergies      Reviewed and updated as needed this visit by Provider         Review of Systems   ROS COMP: Constitutional, HEENT, cardiovascular, pulmonary, GI, , musculoskeletal, neuro, skin, endocrine and psych systems are negative, except as otherwise noted.      Objective    /78   Pulse 67   Temp 98.4  F (36.9  C) (Temporal)   Ht 1.834 m (6' 0.21\")   Wt 99.7 kg (219 lb 14.4 oz)   SpO2 98%   BMI 29.65 kg/m    Body mass index is 29.65 kg/m .  Physical Exam   GENERAL: healthy, alert and no distress  HENT: ear canals and TM's normal, nose and mouth without ulcers or lesions  NECK: no adenopathy, no asymmetry, masses, or scars and thyroid normal to palpation  RESP: lungs clear to auscultation - no rales, rhonchi or wheezes  CV: regular rate and rhythm, normal S1 S2, no S3 or S4, no murmur, click or rub, no peripheral edema and peripheral pulses strong  Skin - plaque type skin lesion on palmar surface of hands and elbow;    Diagnostic Test Results:  Labs reviewed in Epic  Results for orders placed or performed in visit on 06/20/19 (from the past 24 hour(s))   CBC with platelets differential   Result Value Ref Range    WBC 8.8 4.0 - 11.0 10e9/L    RBC Count 4.94 4.4 - 5.9 10e12/L    Hemoglobin 15.0 13.3 - 17.7 g/dL    Hematocrit 42.4 40.0 - 53.0 %    MCV 86 78 - 100 fl    MCH 30.4 26.5 - 33.0 pg    MCHC 35.4 31.5 - 36.5 g/dL    RDW 12.2 10.0 - 15.0 %    Platelet Count 330 150 - 450 10e9/L    Diff Method Automated Method     % Neutrophils 36.2 %    % Lymphocytes 47.6 %    % Monocytes 10.1 %    % Eosinophils 5.0 %    % Basophils 0.9 %    % Immature " "Granulocytes 0.2 %    Absolute Neutrophil 3.2 1.6 - 8.3 10e9/L    Absolute Lymphocytes 4.2 0.8 - 5.3 10e9/L    Absolute Monocytes 0.9 0.0 - 1.3 10e9/L    Absolute Eosinophils 0.4 0.0 - 0.7 10e9/L    Absolute Basophils 0.1 0.0 - 0.2 10e9/L    Abs Immature Granulocytes 0.0 0 - 0.4 10e9/L   XR Chest 2 Views    Narrative    XR CHEST 2 VW  6/20/2019 5:56 PM      HISTORY: Cough; SOB (shortness of breath)    COMPARISON: None available    TECHNIQUE: Upright frontal and lateral views of the chest.    FINDINGS: Hazy right basilar opacity. Remainder the lungs are clear.  No pneumothorax or significant pleural effusion. Cardiomediastinal  silhouette and pulmonary vasculature are within normal limits. The  trachea is midline. Upper abdomen is unremarkable. No suspicious  osseous lesion.      Impression    IMPRESSION: Mild right basilar hazy opacity could represent developing  infectious process.    I have personally reviewed the examination and initial interpretation  and I agree with the findings.    RISSA VASQUEZ MD       Chest x-ray reviewed and I see possible infiltrate right lower base.    Assessment & Plan     1.  Acute right lower lobe pneumonia suspected though his white count is normal.  Decided treated with Levaquin 500 mg daily for 7 days.  Albuterol inhaler prescribed for the wheezing.  Return if he does not improve.     BMI:   Estimated body mass index is 29.65 kg/m  as calculated from the following:    Height as of this encounter: 1.834 m (6' 0.21\").    Weight as of this encounter: 99.7 kg (219 lb 14.4 oz).               No follow-ups on file.    Felix Chauhan MD  [facility]      "

## 2019-08-06 ENCOUNTER — TELEPHONE (OUTPATIENT)
Dept: DERMATOLOGY | Facility: CLINIC | Age: 34
End: 2019-08-06

## 2019-08-06 NOTE — TELEPHONE ENCOUNTER
Patient on wait list.  Called and rescheduled him to 8-16-19 at 7:45 with Dr. Lopez.    Patient has never seen another dermatologist.    Chantell Townsend, CMA

## 2019-08-16 ENCOUNTER — OFFICE VISIT (OUTPATIENT)
Dept: DERMATOLOGY | Facility: CLINIC | Age: 34
End: 2019-08-16
Attending: INTERNAL MEDICINE

## 2019-08-16 DIAGNOSIS — L30.9 DERMATITIS: Primary | ICD-10-CM

## 2019-08-16 PROCEDURE — 99202 OFFICE O/P NEW SF 15 MIN: CPT | Performed by: DERMATOLOGY

## 2019-08-16 RX ORDER — FLUOCINONIDE 0.5 MG/G
CREAM TOPICAL
Qty: 120 G | Refills: 1 | Status: SHIPPED | OUTPATIENT
Start: 2019-08-16 | End: 2020-03-02

## 2019-08-16 ASSESSMENT — PAIN SCALES - GENERAL: PAINLEVEL: NO PAIN (0)

## 2019-08-16 NOTE — NURSING NOTE
Crescencio Moncada's goals for this visit include:   Chief Complaint   Patient presents with     Derm Problem     dermatitis on hands for years has tried OTC (moisturizers, anticrack creams) products but no prescriptions        He requests these members of his care team be copied on today's visit information:     PCP: Apryl Correia    Referring Provider:  Felix Chauhan MD  [address] [place] [address]    There were no vitals taken for this visit.    Do you need any medication refills at today's visit? Vida Reeder LPN

## 2019-08-16 NOTE — LETTER
8/16/2019         RE: Crescencio Moncada  [address] [facility] [address]        Dear Colleague,    Thank you for referring your patient, Crescencio Moncada, to the [facility]. Please see a copy of my visit note below.    [facility] Dermatology Note      Dermatology Problem List:  1. Hand dermatitis   - fluocinonide 0.05% cream    Encounter Date: Aug 16, 2019    CC:  Chief Complaint   Patient presents with     Derm Problem     dermatitis on hands for about 4 years has tried OTC (moisturizers, anticrack creams, hydrocortisone) productsbut no prescriptions          History of Present Illness:  Mr. Crescencio Moncada is a 33 year old male who presents today to discuss hand dermatitis. This is the patient's first visit in our dermatology clinic. It has been two and a half years since this started. It  He has used anti-cracking moisturizers and hydrocortisone with relief, but has to keep using them. He has never tried prescription medications for this. The patient is otherwise feeling well. Works with [employer].Eczema as a kid. NO hx of hay fever or ashthma There are no other skin concerns at this time.      Past Medical History:   Patient Active Problem List   Diagnosis     Injury of right hand, initial encounter     Pain of right hand     Need for diphtheria-tetanus-pertussis (Tdap) vaccine, adult/adolescent     Epigastric pain     Liver lesion     Syncope, unspecified syncope type     Spasm     Eosinophilic esophagitis     Esophageal dysphagia     Weakness of both legs     Tobacco use     Past Medical History:   Diagnosis Date     Tobacco use 6/20/2019     Past Surgical History:   Procedure Laterality Date     COLONOSCOPY WITH CO2 INSUFFLATION N/A 9/11/2017    Procedure: COLONOSCOPY WITH CO2 INSUFFLATION;  EGD, Colonoscopy, Dr Correia referring, BMI 28.11, [facility] fax; [phone];  Surgeon: Amber Lowry MD;  [facility]: [facility]     COMBINED ESOPHAGOSCOPY, GASTROSCOPY,  DUODENOSCOPY (EGD) WITH CO2 INSUFFLATION N/A 9/11/2017    Procedure: COMBINED ESOPHAGOSCOPY, GASTROSCOPY, DUODENOSCOPY (EGD) WITH CO2 INSUFFLATION;  EGD, Colonoscopy, Dr Correia referring, BMI 28.11, [facility] fax; [phone];  Surgeon: Amber Lowry MD;  Location: [facility]     ESOPHAGOSCOPY, GASTROSCOPY, DUODENOSCOPY (EGD), COMBINED N/A 9/11/2017    Procedure: COMBINED ESOPHAGOSCOPY, GASTROSCOPY, DUODENOSCOPY (EGD), BIOPSY SINGLE OR MULTIPLE;;  Surgeon: Amber Lowry MD;  Location: [facility]     ESOPHAGOSCOPY, GASTROSCOPY, DUODENOSCOPY (EGD), COMBINED N/A 11/27/2018    Procedure: COMBINED ESOPHAGOSCOPY, GASTROSCOPY, DUODENOSCOPY (EGD), BIOPSY SINGLE OR MULTIPLE;  Surgeon: Reji Andujar MD;  Location:  GI       Social History:  Patient reports that he quit smoking about 20 months ago. His smoking use included dip, chew, snus or snuff. He has quit using smokeless tobacco. His smokeless tobacco use included chew. He reports that he drinks alcohol. He reports that he does not use drugs.    Family History:  Family History   Problem Relation Age of Onset     Myocardial Infarction Paternal Grandfather 46     Genetic Disorder Mother         spinal muscular atrophy gene     Genetic Disorder Sister 0        spinal muscular atrophy     Coronary Artery Disease Paternal Half-Brother      Kids with no asthma, hayfever or eczema.     Medications:  Current Outpatient Medications   Medication Sig Dispense Refill     albuterol (PROAIR HFA/PROVENTIL HFA/VENTOLIN HFA) 108 (90 Base) MCG/ACT inhaler Inhale 2 puffs into the lungs every 4 hours as needed for shortness of breath / dyspnea or wheezing 1 Inhaler 0       No Known Allergies    Review of Systems:  -Skin Establ Pt: The patient denies any new rash, pruritus, or lesions that are symptomatic, changing or bleeding, except as per HPI.  -Constitutional: The patient is feeling generally well.    Physical exam:  Vitals: There were no vitals taken for this  visit.  GEN: This is a well developed, well-nourished male in no acute distress, in a pleasant mood.    SKIN: Focused examination of the hands and right foot, digits, nailswas performed.  - Eczematous hyperkeratotic patches on the palmar aspects of the hands, dorsal aspect of the right hand, and tips of digits.   - No other lesions of concern on areas examined.       Impression/Plan:  1. 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     Recommended diligent moisturizer with Vanicream.     Referral to Dr. Turner for allergy testing. He will message us if he doesn't hear from them in [place] within 1 week.     Follow-up within 3 months for patch testing.       Staff Involved:    Scribe Disclosure  I, Yon Domingo, am serving as a scribe to document services personally performed by Dr. Marlyn Lopez MD, based on data collection and the provider's statements to me.     Provider Disclosure:   The documentation recorded by the scribe accurately reflects the services I personally performed and the decisions made by me.    Marlyn Lopez MD    Department of Dermatology  [facility]: Phone: [phone], Fax:[phone]  [facility]: Phone: [phone], Fax: [phone]              Again, thank you for allowing me to participate in the care of your patient.        Sincerely,        Marlyn Lopez MD

## 2019-08-16 NOTE — PROGRESS NOTES
[facility] Dermatology Note      Dermatology Problem List:  1. Hand dermatitis   - fluocinonide 0.05% cream    Encounter Date: Aug 16, 2019    CC:  Chief Complaint   Patient presents with     Derm Problem     dermatitis on hands for about 4 years has tried OTC (moisturizers, anticrack creams, hydrocortisone) productsbut no prescriptions          History of Present Illness:  Mr. Crescencio Moncada is a 33 year old male who presents today to discuss hand dermatitis. This is the patient's first visit in our dermatology clinic. It has been two and a half years since this started. It  He has used anti-cracking moisturizers and hydrocortisone with relief, but has to keep using them. He has never tried prescription medications for this. The patient is otherwise feeling well. Works with [email or web address].Eczema as a kid. NO hx of hay fever or ashthma There are no other skin concerns at this time.      Past Medical History:   Patient Active Problem List   Diagnosis     Injury of right hand, initial encounter     Pain of right hand     Need for diphtheria-tetanus-pertussis (Tdap) vaccine, adult/adolescent     Epigastric pain     Liver lesion     Syncope, unspecified syncope type     Spasm     Eosinophilic esophagitis     Esophageal dysphagia     Weakness of both legs     Tobacco use     Past Medical History:   Diagnosis Date     Tobacco use 6/20/2019     Past Surgical History:   Procedure Laterality Date     COLONOSCOPY WITH CO2 INSUFFLATION N/A 9/11/2017    Procedure: COLONOSCOPY WITH CO2 INSUFFLATION;  EGD, Colonoscopy, Dr Correia referring, BMI 28.11, [facility] fax; [phone];  Surgeon: Amber Lowry MD;  Location: [facility]     COMBINED ESOPHAGOSCOPY, GASTROSCOPY, DUODENOSCOPY (EGD) WITH CO2 INSUFFLATION N/A 9/11/2017    Procedure: COMBINED ESOPHAGOSCOPY, GASTROSCOPY, DUODENOSCOPY (EGD) WITH CO2 INSUFFLATION;  EGD, Colonoscopy, Dr Correia referring, BMI 28.11, [facility] fax; [phone];   Surgeon: Amber Lowry MD;  Location: [facility]     ESOPHAGOSCOPY, GASTROSCOPY, DUODENOSCOPY (EGD), COMBINED N/A 9/11/2017    Procedure: COMBINED ESOPHAGOSCOPY, GASTROSCOPY, DUODENOSCOPY (EGD), BIOPSY SINGLE OR MULTIPLE;;  Surgeon: Amber Lowry MD;  Location: [facility]     ESOPHAGOSCOPY, GASTROSCOPY, DUODENOSCOPY (EGD), COMBINED N/A 11/27/2018    Procedure: COMBINED ESOPHAGOSCOPY, GASTROSCOPY, DUODENOSCOPY (EGD), BIOPSY SINGLE OR MULTIPLE;  Surgeon: Reij Andujar MD;  Location:  GI       Social History:  Patient reports that he quit smoking about 20 months ago. His smoking use included dip, chew, snus or snuff. He has quit using smokeless tobacco. His smokeless tobacco use included chew. He reports that he drinks alcohol. He reports that he does not use drugs.    Family History:  Family History   Problem Relation Age of Onset     Myocardial Infarction Paternal Grandfather 46     Genetic Disorder Mother         spinal muscular atrophy gene     Genetic Disorder Sister 0        spinal muscular atrophy     Coronary Artery Disease Paternal Half-Brother      Kids with no asthma, hayfever or eczema.     Medications:  Current Outpatient Medications   Medication Sig Dispense Refill     albuterol (PROAIR HFA/PROVENTIL HFA/VENTOLIN HFA) 108 (90 Base) MCG/ACT inhaler Inhale 2 puffs into the lungs every 4 hours as needed for shortness of breath / dyspnea or wheezing 1 Inhaler 0       No Known Allergies    Review of Systems:  -Skin Establ Pt: The patient denies any new rash, pruritus, or lesions that are symptomatic, changing or bleeding, except as per HPI.  -Constitutional: The patient is feeling generally well.    Physical exam:  Vitals: There were no vitals taken for this visit.  GEN: This is a well developed, well-nourished male in no acute distress, in a pleasant mood.    SKIN: Focused examination of the hands and right foot, digits, nailswas performed.  - Eczematous hyperkeratotic patches on the palmar  aspects of the hands, dorsal aspect of the right hand, and tips of digits.   - No other lesions of concern on areas examined.       Impression/Plan:  1. 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     Recommended diligent moisturizer with Vanicream.     Referral to Dr. Turner for allergy testing. He will message us if he doesn't hear from them in [place] within 1 week.     Follow-up within 3 months for patch testing.       Staff Involved:    Scribe Disclosure  I, Yon Domingo, am serving as a scribe to document services personally performed by Dr. Marlyn Lopez MD, based on data collection and the provider's statements to me.     Provider Disclosure:   The documentation recorded by the scribe accurately reflects the services I personally performed and the decisions made by me.    Marlyn Lopez MD    Department of Dermatology  [facility]: Phone: [phone], Fax:[phone]  [facility]: Phone: [phone], Fax: [phone]

## 2019-08-24 NOTE — TELEPHONE ENCOUNTER
FUTURE VISIT INFORMATION      FUTURE VISIT INFORMATION:    Date: 9.17.19    Time: 7:00    Location:  Allergy  REFERRAL INFORMATION:    Referring provider: Dr. Lopez    Referring providers clinic:  [facility]    Reason for visit/diagnosis:  allergy Dermatitis consult    RECORDS REQUESTED FROM:       Clinic name Comments Records Status Imaging Status   [facility] 8.16.19 Dr. Lopez In [facility]

## 2019-09-17 ENCOUNTER — PRE VISIT (OUTPATIENT)
Dept: ALLERGY | Facility: CLINIC | Age: 34
End: 2019-09-17

## 2019-10-09 ENCOUNTER — OFFICE VISIT (OUTPATIENT)
Dept: FAMILY MEDICINE | Facility: OTHER | Age: 34
End: 2019-10-09

## 2019-10-09 VITALS
HEART RATE: 70 BPM | SYSTOLIC BLOOD PRESSURE: 116 MMHG | WEIGHT: 226 LBS | DIASTOLIC BLOOD PRESSURE: 70 MMHG | RESPIRATION RATE: 16 BRPM | OXYGEN SATURATION: 98 % | BODY MASS INDEX: 30.61 KG/M2 | TEMPERATURE: 97.8 F | HEIGHT: 72 IN

## 2019-10-09 DIAGNOSIS — R10.12 ABDOMINAL PAIN, LEFT UPPER QUADRANT: Primary | ICD-10-CM

## 2019-10-09 LAB
ALBUMIN SERPL-MCNC: 3.7 G/DL (ref 3.4–5)
ALBUMIN UR-MCNC: NEGATIVE MG/DL
ALP SERPL-CCNC: 70 U/L (ref 40–150)
ALT SERPL W P-5'-P-CCNC: 30 U/L (ref 0–70)
ANION GAP SERPL CALCULATED.3IONS-SCNC: 6 MMOL/L (ref 3–14)
APPEARANCE UR: CLEAR
AST SERPL W P-5'-P-CCNC: 18 U/L (ref 0–45)
BASOPHILS # BLD AUTO: 0.1 10E9/L (ref 0–0.2)
BASOPHILS NFR BLD AUTO: 1.2 %
BILIRUB SERPL-MCNC: 0.5 MG/DL (ref 0.2–1.3)
BILIRUB UR QL STRIP: NEGATIVE
BUN SERPL-MCNC: 15 MG/DL (ref 7–30)
CALCIUM SERPL-MCNC: 8.7 MG/DL (ref 8.5–10.1)
CHLORIDE SERPL-SCNC: 108 MMOL/L (ref 94–109)
CO2 SERPL-SCNC: 26 MMOL/L (ref 20–32)
COLOR UR AUTO: YELLOW
CREAT SERPL-MCNC: 0.95 MG/DL (ref 0.66–1.25)
CRP SERPL-MCNC: <2.9 MG/L (ref 0–8)
DIFFERENTIAL METHOD BLD: NORMAL
EOSINOPHIL # BLD AUTO: 0.6 10E9/L (ref 0–0.7)
EOSINOPHIL NFR BLD AUTO: 9 %
ERYTHROCYTE [DISTWIDTH] IN BLOOD BY AUTOMATED COUNT: 12.7 % (ref 10–15)
GFR SERPL CREATININE-BSD FRML MDRD: >90 ML/MIN/{1.73_M2}
GLUCOSE SERPL-MCNC: 91 MG/DL (ref 70–99)
GLUCOSE UR STRIP-MCNC: NEGATIVE MG/DL
HCT VFR BLD AUTO: 45.5 % (ref 40–53)
HGB BLD-MCNC: 15.7 G/DL (ref 13.3–17.7)
HGB UR QL STRIP: NEGATIVE
KETONES UR STRIP-MCNC: NEGATIVE MG/DL
LEUKOCYTE ESTERASE UR QL STRIP: NEGATIVE
LIPASE SERPL-CCNC: 133 U/L (ref 73–393)
LYMPHOCYTES # BLD AUTO: 2.4 10E9/L (ref 0.8–5.3)
LYMPHOCYTES NFR BLD AUTO: 36.8 %
MCH RBC QN AUTO: 30.5 PG (ref 26.5–33)
MCHC RBC AUTO-ENTMCNC: 34.5 G/DL (ref 31.5–36.5)
MCV RBC AUTO: 89 FL (ref 78–100)
MONOCYTES # BLD AUTO: 0.6 10E9/L (ref 0–1.3)
MONOCYTES NFR BLD AUTO: 9.2 %
NEUTROPHILS # BLD AUTO: 2.9 10E9/L (ref 1.6–8.3)
NEUTROPHILS NFR BLD AUTO: 43.8 %
NITRATE UR QL: NEGATIVE
PH UR STRIP: 5.5 PH (ref 5–7)
PLATELET # BLD AUTO: 247 10E9/L (ref 150–450)
POTASSIUM SERPL-SCNC: 4 MMOL/L (ref 3.4–5.3)
PROT SERPL-MCNC: 6.7 G/DL (ref 6.8–8.8)
RBC # BLD AUTO: 5.14 10E12/L (ref 4.4–5.9)
SODIUM SERPL-SCNC: 140 MMOL/L (ref 133–144)
SOURCE: NORMAL
SP GR UR STRIP: 1.02 (ref 1–1.03)
UROBILINOGEN UR STRIP-ACNC: 0.2 EU/DL (ref 0.2–1)
WBC # BLD AUTO: 6.5 10E9/L (ref 4–11)

## 2019-10-09 PROCEDURE — 86140 C-REACTIVE PROTEIN: CPT | Performed by: FAMILY MEDICINE

## 2019-10-09 PROCEDURE — 80053 COMPREHEN METABOLIC PANEL: CPT | Performed by: FAMILY MEDICINE

## 2019-10-09 PROCEDURE — 81003 URINALYSIS AUTO W/O SCOPE: CPT | Performed by: FAMILY MEDICINE

## 2019-10-09 PROCEDURE — 83690 ASSAY OF LIPASE: CPT | Performed by: FAMILY MEDICINE

## 2019-10-09 PROCEDURE — 99214 OFFICE O/P EST MOD 30 MIN: CPT | Performed by: FAMILY MEDICINE

## 2019-10-09 PROCEDURE — 85025 COMPLETE CBC W/AUTO DIFF WBC: CPT | Performed by: FAMILY MEDICINE

## 2019-10-09 PROCEDURE — 36415 COLL VENOUS BLD VENIPUNCTURE: CPT | Performed by: FAMILY MEDICINE

## 2019-10-09 RX ORDER — NICOTINE POLACRILEX 4 MG/1
20 GUM, CHEWING ORAL DAILY
COMMUNITY
End: 2019-10-09

## 2019-10-09 RX ORDER — NICOTINE POLACRILEX 4 MG/1
20 GUM, CHEWING ORAL 2 TIMES DAILY
Qty: 180 TABLET | Refills: 0 | Status: SHIPPED | OUTPATIENT
Start: 2019-10-09 | End: 2019-10-10

## 2019-10-09 ASSESSMENT — MIFFLIN-ST. JEOR: SCORE: 1996.46

## 2019-10-09 ASSESSMENT — PAIN SCALES - GENERAL: PAINLEVEL: MILD PAIN (3)

## 2019-10-09 NOTE — PROGRESS NOTES
"Subjective     Crescencio Moncada is a 34 year old male who presents to clinic today for the following health issues:    History of Present Illness        He eats 0-1 servings of fruits and vegetables daily.He consumes 2 sweetened beverage(s) daily.He is missing 7 dose(s) of medications per week.  He is not taking prescribed medications regularly due to remembering to take.     ABDOMINAL and FLANK PAIN     Onset: x 2 years    Description:   Character: Dull ache and Tender Feeling  Location: left upper quadrant left flank  Radiation: None    Intensity: moderate, severe, 3/10    Progression of Symptoms:  worsening    Accompanying Signs & Symptoms:  Fever/Chills?: no   Gas/Bloating: YES- Bloating  Nausea: no   Vomitting: no   Diarrhea?: no   Constipation:no   Dysuria or Hematuria: no    History:   Trauma: no   Previous similar pain: no    Previous tests done: EGD    Precipitating factors:   Does the pain change with:     Food: no      BM: no     Urination: no     Alleviating factors:  NA    Therapies Tried and outcome: None      LMP:  not applicable   Constant pain on the left side going on for 1 month which seems to be radiating anteriorly under the ribs . Feels like a squeezing pressure. Not associated with food. Laying on the left seems to help. Pressure relieves pain. No changes with urine. Has gained weight lately. Appetite has reduced in the recent past . Former smoker and tobacco use -quit- 2.5 weeks ago. No dark colored stools. No fevers.     Also has symptoms difficulty swallowing. These symptoms have been going on for 2 yrs. Had EGD in the past and was found to have esonophilic esophagitis. He was supposed to go back to have an EGD but could not complete it due to lack of insurance    Diet is not particularly healthy\" I eat out a lot and eat hambergers\"    -------------------------------------    Patient Active Problem List   Diagnosis     Injury of right hand, initial encounter     Pain of right hand     Need " for diphtheria-tetanus-pertussis (Tdap) vaccine, adult/adolescent     Epigastric pain     Liver lesion     Syncope, unspecified syncope type     Spasm     Eosinophilic esophagitis     Esophageal dysphagia     Weakness of both legs     Tobacco use     Past Surgical History:   Procedure Laterality Date     COLONOSCOPY WITH CO2 INSUFFLATION N/A 2017    Procedure: COLONOSCOPY WITH CO2 INSUFFLATION;  EGD, Colonoscopy, Dr Correia referring, BMI 28.11, [facility] fax; [phone];  Surgeon: Amber Lowry MD;  Location: [facility]     COMBINED ESOPHAGOSCOPY, GASTROSCOPY, DUODENOSCOPY (EGD) WITH CO2 INSUFFLATION N/A 2017    Procedure: COMBINED ESOPHAGOSCOPY, GASTROSCOPY, DUODENOSCOPY (EGD) WITH CO2 INSUFFLATION;  EGD, Colonoscopy, Dr Correia referring, BMI 28.11, [facility] fax; [phone];  Surgeon: Amber Lowry MD;  Location: [facility]     ESOPHAGOSCOPY, GASTROSCOPY, DUODENOSCOPY (EGD), COMBINED N/A 2017    Procedure: COMBINED ESOPHAGOSCOPY, GASTROSCOPY, DUODENOSCOPY (EGD), BIOPSY SINGLE OR MULTIPLE;;  Surgeon: Amber Lowry MD;  Location: [facility]     ESOPHAGOSCOPY, GASTROSCOPY, DUODENOSCOPY (EGD), COMBINED N/A 2018    Procedure: COMBINED ESOPHAGOSCOPY, GASTROSCOPY, DUODENOSCOPY (EGD), BIOPSY SINGLE OR MULTIPLE;  Surgeon: Reji Andujar MD;  Location:  GI       Social History     Tobacco Use     Smoking status: Former Smoker     Packs/day: 0.00     Types: Dip, chew, snus or snuff     Last attempt to quit: 2017     Years since quittin.8     Smokeless tobacco: Former User     Types: Chew     Tobacco comment: every 3 days   Substance Use Topics     Alcohol use: Yes     Comment: 4-5 beers/day, sometimes more on the weekend     Family History   Problem Relation Age of Onset     Myocardial Infarction Paternal Grandfather 46     Genetic Disorder Mother         spinal muscular atrophy gene     Genetic Disorder Sister 0        spinal muscular atrophy     Coronary Artery  "Disease Paternal Half-Brother          Current Outpatient Medications   Medication Sig Dispense Refill     fluocinonide (LIDEX) 0.05 % external cream Apply twice daily for 2 weeks, then weekends only for 2 weeks, then repeat the cycle (Patient not taking: Reported on 10/9/2019) 120 g 1     omeprazole 20 MG tablet Take 20 mg by mouth daily       No Known Allergies  BP Readings from Last 3 Encounters:   10/09/19 116/70   06/20/19 125/78   02/11/19 120/62    Wt Readings from Last 3 Encounters:   10/09/19 102.5 kg (226 lb)   06/20/19 99.7 kg (219 lb 14.4 oz)   02/11/19 100.1 kg (220 lb 9.6 oz)                    Reviewed and updated as needed this visit by Provider         Review of Systems   ROS COMP: Constitutional, HEENT, cardiovascular, pulmonary, GI, , musculoskeletal, neuro, skin, endocrine and psych systems are negative, except as otherwise noted.      Objective    /70 (BP Location: Left arm, Patient Position: Chair, Cuff Size: Adult Regular)   Pulse 70   Temp 97.8  F (36.6  C) (Temporal)   Resp 16   Ht 1.834 m (6' 0.21\")   Wt 102.5 kg (226 lb)   SpO2 98%   BMI 30.47 kg/m    Body mass index is 30.47 kg/m .  Physical Exam  Constitutional:       General: He is not in acute distress.     Appearance: Normal appearance. He is not ill-appearing, toxic-appearing or diaphoretic.   HENT:      Head: Normocephalic and atraumatic.      Nose: Nose normal.   Neck:      Musculoskeletal: Normal range of motion and neck supple.   Cardiovascular:      Rate and Rhythm: Normal rate and regular rhythm.   Pulmonary:      Effort: Pulmonary effort is normal.      Breath sounds: Normal breath sounds.   Abdominal:      General: Abdomen is flat. Bowel sounds are normal. There is no distension.      Palpations: Abdomen is soft. There is no mass.      Tenderness: There is tenderness. There is no right CVA tenderness, left CVA tenderness, guarding or rebound.      Hernia: No hernia is present.   Neurological:      Mental " "Status: He is alert.   Psychiatric:         Mood and Affect: Mood normal.         Behavior: Behavior normal.         Thought Content: Thought content normal.         Judgement: Judgment normal.            Diagnostic Test Results:  Labs reviewed in Epic        Assessment & Plan   Problem List Items Addressed This Visit     Abdominal pain, left upper quadrant - Primary     D/D pancreatitis vs PUD vs erosive gastropathy  Drinks upto 4-5 beers a day which is the likely trigger  Reviewed last EGD done in 11/2018 showing gastropathy and esonophilic esophagitis which could explain his symptoms.   Labs as ordered to r/o above differential. Encouraged to stop alcohol use. Trial omeprazole in the interim . Recheck in 2 weeks for close follow up         Relevant Medications    omeprazole 20 MG tablet    Other Relevant Orders    CBC with platelets and differential (Completed)    Comprehensive metabolic panel (Completed)    Lipase (Completed)    CRP, inflammation (Completed)    *UA reflex to Microscopic (Completed)    Helicobacter pylori Antigen Stool             BMI:   Estimated body mass index is 30.47 kg/m  as calculated from the following:    Height as of this encounter: 1.834 m (6' 0.21\").    Weight as of this encounter: 102.5 kg (226 lb).           See Patient Instructions  Return in about 2 weeks (around 10/23/2019).    Angela Gallagher MD  [facility]    "

## 2019-10-09 NOTE — ASSESSMENT & PLAN NOTE
D/D pancreatitis vs PUD vs erosive gastropathy  Drinks upto 4-5 beers a day which is the likely trigger  Reviewed last EGD done in 11/2018 showing gastropathy and esonophilic esophagitis which could explain his symptoms.   Labs as ordered to r/o above differential. Encouraged to stop alcohol use. Trial omeprazole in the interim . Recheck in 2 weeks for close follow up

## 2019-10-10 ENCOUNTER — TELEPHONE (OUTPATIENT)
Dept: FAMILY MEDICINE | Facility: OTHER | Age: 34
End: 2019-10-10

## 2019-10-10 DIAGNOSIS — K21.00 GASTROESOPHAGEAL REFLUX DISEASE WITH ESOPHAGITIS: Primary | ICD-10-CM

## 2019-10-10 NOTE — TELEPHONE ENCOUNTER
[facility] is requesting a change from tablets to capsules due to a huge price difference for Prilosec 20mg.

## 2020-02-11 ENCOUNTER — OFFICE VISIT (OUTPATIENT)
Dept: ORTHOPEDICS | Facility: CLINIC | Age: 35
End: 2020-02-11

## 2020-02-11 ENCOUNTER — ANCILLARY PROCEDURE (OUTPATIENT)
Dept: GENERAL RADIOLOGY | Facility: CLINIC | Age: 35
End: 2020-02-11
Attending: ORTHOPAEDIC SURGERY

## 2020-02-11 VITALS
WEIGHT: 225 LBS | SYSTOLIC BLOOD PRESSURE: 120 MMHG | BODY MASS INDEX: 29.82 KG/M2 | DIASTOLIC BLOOD PRESSURE: 73 MMHG | HEIGHT: 73 IN

## 2020-02-11 DIAGNOSIS — S62.336A CLOSED DISPLACED FRACTURE OF NECK OF FIFTH METACARPAL BONE OF RIGHT HAND, INITIAL ENCOUNTER: Primary | ICD-10-CM

## 2020-02-11 DIAGNOSIS — M79.641 RIGHT HAND PAIN: ICD-10-CM

## 2020-02-11 PROCEDURE — 26600 TREAT METACARPAL FRACTURE: CPT | Mod: RT | Performed by: ORTHOPAEDIC SURGERY

## 2020-02-11 PROCEDURE — 73130 X-RAY EXAM OF HAND: CPT | Mod: TC

## 2020-02-11 PROCEDURE — 99203 OFFICE O/P NEW LOW 30 MIN: CPT | Mod: 57 | Performed by: ORTHOPAEDIC SURGERY

## 2020-02-11 ASSESSMENT — MIFFLIN-ST. JEOR: SCORE: 2004.47

## 2020-02-11 NOTE — LETTER
2020         RE: Crescencio Moncada  [address] [facility] [id number]        Dear Colleague,    Thank you for referring your patient, Crescencio Moncada, to the [facility]. Please see a copy of my visit note below.    Assessment / Plan     ICD-10-CM    1. Right hand pain M79.641 XR Hand Right G/E 3 Views        Plan: We can place him into a boxer's fracture XO's brace.  We are going to have him follow-up in 2 weeks for repeat x-rays.  He is not to lift anything with that hand in the meantime.  Follow up in 2 weeks     HPI / History / ROS   Crescencio Moncada is a 34 year old male who is being seen for   Chief Complaint   Patient presents with     Consult     right hand injury doi 20 punched wall   He said he punched a concrete floor a few days ago had immediate pain after he has had a history of previous boxer's fracture from a similar incident.  This caused him some discomfort and quite a long recovery.  He is not having any other changes.  He says it feels very similar to that.  He does have swelling in his hand.  Rest makes it better activity makes it worse.    Medications  fluocinonide (LIDEX) 0.05 % external cream, Apply twice daily for 2 weeks, then weekends only for 2 weeks, then repeat the cycle (Patient not taking: Reported on 10/9/2019)  [] omeprazole (PRILOSEC) 20 MG DR capsule, Take 1 capsule (20 mg) by mouth 2 times daily    No current facility-administered medications on file prior to visit.          Allergies   No Known Allergies     [facility]  Past Medical History:   Diagnosis Date     Tobacco use 2019       Past Surgical History:   Procedure Laterality Date     COLONOSCOPY WITH CO2 INSUFFLATION N/A 2017    Procedure: COLONOSCOPY WITH CO2 INSUFFLATION;  EGD, Colonoscopy, Dr Correia referring, BMI 28.11, [facility] fax; [phone];  Surgeon: Amber Lowry MD;  [facility]     COMBINED ESOPHAGOSCOPY, GASTROSCOPY, DUODENOSCOPY (EGD) WITH CO2  "INSUFFLATION N/A 2017    Procedure: COMBINED ESOPHAGOSCOPY, GASTROSCOPY, DUODENOSCOPY (EGD) WITH CO2 INSUFFLATION;  EGD, Colonoscopy, Dr Correia referring, BMI 28.11, [facility] fax; [phone];  Surgeon: Amber Lowry MD;  [facility]: [facility]     ESOPHAGOSCOPY, GASTROSCOPY, DUODENOSCOPY (EGD), COMBINED N/A 2017    Procedure: COMBINED ESOPHAGOSCOPY, GASTROSCOPY, DUODENOSCOPY (EGD), BIOPSY SINGLE OR MULTIPLE;;  Surgeon: Amber Lowry MD;  Location: [facility]     ESOPHAGOSCOPY, GASTROSCOPY, DUODENOSCOPY (EGD), COMBINED N/A 2018    Procedure: COMBINED ESOPHAGOSCOPY, GASTROSCOPY, DUODENOSCOPY (EGD), BIOPSY SINGLE OR MULTIPLE;  Surgeon: Reji Andujar MD;  Location:  GI       Family History   Problem Relation Age of Onset     Myocardial Infarction Paternal Grandfather 46     Genetic Disorder Mother         spinal muscular atrophy gene     Genetic Disorder Sister 0        spinal muscular atrophy     Coronary Artery Disease Paternal Half-Brother        Social History     Tobacco Use     Smoking status: Former Smoker     Packs/day: 0.00     Types: Dip, chew, snus or snuff     Last attempt to quit: 2017     Years since quittin.2     Smokeless tobacco: Former User     Types: Chew     Tobacco comment: every 3 days   Substance Use Topics     Alcohol use: Yes     Comment: 4-5 beers/day, sometimes more on the weekend          ROS  Review of Systems   REVIEW OF SYSTEMS  General: negative for, night sweats, dizziness, fatigue  Resp: No shortness of breath and no cough  CV: negative for chest pain, syncope or near-syncope  GI: negative for nausea, vomiting and diarrhea  : negative for dysuria and hematuria  Musculoskeletal: as above  Neurologic: negative for syncope   Hematologic: negative for bleeding disorder    Physical / Results   Physical Exam         Physical Exam:  Vitals: /73   Ht 1.854 m (6' 1\")   Wt 102.1 kg (225 lb)   BMI 29.69 kg/m     BMI= Body mass index is " 29.69 kg/m .  Constitutional: healthy, alert and no acute distress   Psychiatric: mentation appears normal and affect normal/bright  NEURO: no focal deficits  RESP: Normal with easy respirations and no use of accessory muscles to breathe, no audible wheezing or retractions  CV: Pulses are palpable  SKIN: Warm with no erythema  JOINT/EXTREMITIES: His skin is warm he is neurovascular intact.  He does have swelling in the hand.  He is tender along the fifth metacarpal neck.  There is no instability.  I feel no crepitance.       IMAGING:   X-rays of his right hand do show previous fracture that is well-healed.  He also has a lucency through this area that appears to be a new fracture.  There are no fractures or other fractures or dislocations noted.  Independent visualization of the images was performed.    Arben Nuno MD    Again, thank you for allowing me to participate in the care of your patient.        Sincerely,        Arben Nuno MD

## 2020-02-12 NOTE — PROGRESS NOTES
Assessment / Plan     ICD-10-CM    1. Right hand pain M79.641 XR Hand Right G/E 3 Views        Plan: We can place him into a boxer's fracture XO's brace.  We are going to have him follow-up in 2 weeks for repeat x-rays.  He is not to lift anything with that hand in the meantime.  Follow up in 2 weeks     HPI / History / JASON   Crescencio Moncada is a 34 year old male who is being seen for   Chief Complaint   Patient presents with     Consult     right hand injury doi 20 punched wall   He said he punched a concrete floor a few days ago had immediate pain after he has had a history of previous boxer's fracture from a similar incident.  This caused him some discomfort and quite a long recovery.  He is not having any other changes.  He says it feels very similar to that.  He does have swelling in his hand.  Rest makes it better activity makes it worse.    Medications  fluocinonide (LIDEX) 0.05 % external cream, Apply twice daily for 2 weeks, then weekends only for 2 weeks, then repeat the cycle (Patient not taking: Reported on 10/9/2019)  [] omeprazole (PRILOSEC) 20 MG DR capsule, Take 1 capsule (20 mg) by mouth 2 times daily    No current facility-administered medications on file prior to visit.          Allergies   No Known Allergies     [facility]  Past Medical History:   Diagnosis Date     Tobacco use 2019       Past Surgical History:   Procedure Laterality Date     COLONOSCOPY WITH CO2 INSUFFLATION N/A 2017    Procedure: COLONOSCOPY WITH CO2 INSUFFLATION;  EGD, Colonoscopy, Dr Correia referring, BMI 28.11, [facility] fax; [phone];  Surgeon: Amber Lowry MD;  [facility]     COMBINED ESOPHAGOSCOPY, GASTROSCOPY, DUODENOSCOPY (EGD) WITH CO2 INSUFFLATION N/A 2017    Procedure: COMBINED ESOPHAGOSCOPY, GASTROSCOPY, DUODENOSCOPY (EGD) WITH CO2 INSUFFLATION;  EGD, Colonoscopy, Dr Correia referring, BMI 28.11, [facility] fax; [phone];  Surgeon: Amber Lowry MD;   "Location: [facility]     ESOPHAGOSCOPY, GASTROSCOPY, DUODENOSCOPY (EGD), COMBINED N/A 2017    Procedure: COMBINED ESOPHAGOSCOPY, GASTROSCOPY, DUODENOSCOPY (EGD), BIOPSY SINGLE OR MULTIPLE;;  Surgeon: Amber Lowry MD;  Location: [facility]     ESOPHAGOSCOPY, GASTROSCOPY, DUODENOSCOPY (EGD), COMBINED N/A 2018    Procedure: COMBINED ESOPHAGOSCOPY, GASTROSCOPY, DUODENOSCOPY (EGD), BIOPSY SINGLE OR MULTIPLE;  Surgeon: Reji Andujar MD;  [facility]       Family History   Problem Relation Age of Onset     Myocardial Infarction Paternal Grandfather 46     Genetic Disorder Mother         spinal muscular atrophy gene     Genetic Disorder Sister 0        spinal muscular atrophy     Coronary Artery Disease Paternal Half-Brother        Social History     Tobacco Use     Smoking status: Former Smoker     Packs/day: 0.00     Types: Dip, chew, snus or snuff     Last attempt to quit: 2017     Years since quittin.2     Smokeless tobacco: Former User     Types: Chew     Tobacco comment: every 3 days   Substance Use Topics     Alcohol use: Yes     Comment: 4-5 beers/day, sometimes more on the weekend          ROS  Review of Systems   REVIEW OF SYSTEMS  General: negative for, night sweats, dizziness, fatigue  Resp: No shortness of breath and no cough  CV: negative for chest pain, syncope or near-syncope  GI: negative for nausea, vomiting and diarrhea  : negative for dysuria and hematuria  Musculoskeletal: as above  Neurologic: negative for syncope   Hematologic: negative for bleeding disorder    Physical / Results   Physical Exam         Physical Exam:  Vitals: /73   Ht 1.854 m (6' 1\")   Wt 102.1 kg (225 lb)   BMI 29.69 kg/m    BMI= Body mass index is 29.69 kg/m .  Constitutional: healthy, alert and no acute distress   Psychiatric: mentation appears normal and affect normal/bright  NEURO: no focal deficits  RESP: Normal with easy respirations and no use of accessory muscles to breathe, no " audible wheezing or retractions  CV: Pulses are palpable  SKIN: Warm with no erythema  JOINT/EXTREMITIES: His skin is warm he is neurovascular intact.  He does have swelling in the hand.  He is tender along the fifth metacarpal neck.  There is no instability.  I feel no crepitance.       IMAGING:   X-rays of his right hand do show previous fracture that is well-healed.  He also has a lucency through this area that appears to be a new fracture.  There are no fractures or other fractures or dislocations noted.  Independent visualization of the images was performed.    Arben Nuno MD

## 2020-02-28 ENCOUNTER — NURSE TRIAGE (OUTPATIENT)
Dept: FAMILY MEDICINE | Facility: OTHER | Age: 35
End: 2020-02-28

## 2020-02-28 NOTE — TELEPHONE ENCOUNTER
Please triage abdominal pain.           MAGALIS De Leon CNP  Questions or concerns please feel free to send me a [employer] message or call me  Phone : [phone]

## 2020-03-02 ENCOUNTER — HOSPITAL ENCOUNTER (EMERGENCY)
Facility: CLINIC | Age: 35
Discharge: HOME | End: 2020-03-02
Attending: EMERGENCY MEDICINE | Admitting: EMERGENCY MEDICINE

## 2020-03-02 DIAGNOSIS — R10.13 ABDOMINAL PAIN, EPIGASTRIC: ICD-10-CM

## 2020-03-02 DIAGNOSIS — K20.0 EOSINOPHILIC ESOPHAGITIS: ICD-10-CM

## 2020-03-02 LAB
ALBUMIN SERPL-MCNC: 3.9 G/DL (ref 3.4–5)
ALP SERPL-CCNC: 77 U/L (ref 40–150)
ALT SERPL W P-5'-P-CCNC: 35 U/L (ref 0–70)
ANION GAP SERPL CALCULATED.3IONS-SCNC: 6 MMOL/L (ref 3–14)
AST SERPL W P-5'-P-CCNC: 21 U/L (ref 0–45)
BASOPHILS # BLD AUTO: 0.1 10E9/L (ref 0–0.2)
BASOPHILS NFR BLD AUTO: 1.5 %
BILIRUB SERPL-MCNC: 0.4 MG/DL (ref 0.2–1.3)
BUN SERPL-MCNC: 16 MG/DL (ref 7–30)
CALCIUM SERPL-MCNC: 8.8 MG/DL (ref 8.5–10.1)
CHLORIDE SERPL-SCNC: 110 MMOL/L (ref 94–109)
CO2 SERPL-SCNC: 24 MMOL/L (ref 20–32)
CREAT SERPL-MCNC: 0.84 MG/DL (ref 0.66–1.25)
DIFFERENTIAL METHOD BLD: NORMAL
EOSINOPHIL NFR BLD AUTO: 7.6 %
ERYTHROCYTE [DISTWIDTH] IN BLOOD BY AUTOMATED COUNT: 12.1 % (ref 10–15)
GFR SERPL CREATININE-BSD FRML MDRD: >90 ML/MIN/{1.73_M2}
GLUCOSE SERPL-MCNC: 89 MG/DL (ref 70–99)
HCT VFR BLD AUTO: 44.3 % (ref 40–53)
HGB BLD-MCNC: 15.5 G/DL (ref 13.3–17.7)
IMM GRANULOCYTES # BLD: 0 10E9/L (ref 0–0.4)
IMM GRANULOCYTES NFR BLD: 0.3 %
LIPASE SERPL-CCNC: 105 U/L (ref 73–393)
LYMPHOCYTES # BLD AUTO: 2.7 10E9/L (ref 0.8–5.3)
LYMPHOCYTES NFR BLD AUTO: 40.2 %
MCH RBC QN AUTO: 30.8 PG (ref 26.5–33)
MCHC RBC AUTO-ENTMCNC: 35 G/DL (ref 31.5–36.5)
MCV RBC AUTO: 88 FL (ref 78–100)
MONOCYTES # BLD AUTO: 0.6 10E9/L (ref 0–1.3)
MONOCYTES NFR BLD AUTO: 8.9 %
NEUTROPHILS # BLD AUTO: 2.8 10E9/L (ref 1.6–8.3)
NEUTROPHILS NFR BLD AUTO: 41.5 %
NRBC # BLD AUTO: 0 10*3/UL
NRBC BLD AUTO-RTO: 0 /100
PLATELET # BLD AUTO: 288 10E9/L (ref 150–450)
POTASSIUM SERPL-SCNC: 3.9 MMOL/L (ref 3.4–5.3)
PROT SERPL-MCNC: 7 G/DL (ref 6.8–8.8)
RBC # BLD AUTO: 5.04 10E12/L (ref 4.4–5.9)
SODIUM SERPL-SCNC: 140 MMOL/L (ref 133–144)
WBC # BLD AUTO: 6.7 10E9/L (ref 4–11)

## 2020-03-02 PROCEDURE — 36415 COLL VENOUS BLD VENIPUNCTURE: CPT | Performed by: EMERGENCY MEDICINE

## 2020-03-02 PROCEDURE — 80053 COMPREHEN METABOLIC PANEL: CPT | Performed by: EMERGENCY MEDICINE

## 2020-03-02 PROCEDURE — 83690 ASSAY OF LIPASE: CPT | Performed by: EMERGENCY MEDICINE

## 2020-03-02 PROCEDURE — 85025 COMPLETE CBC W/AUTO DIFF WBC: CPT | Performed by: EMERGENCY MEDICINE

## 2020-03-02 PROCEDURE — 25000132 ZZH RX MED GY IP 250 OP 250 PS 637: Performed by: EMERGENCY MEDICINE

## 2020-03-02 PROCEDURE — 25000125 ZZHC RX 250: Performed by: EMERGENCY MEDICINE

## 2020-03-02 PROCEDURE — 99283 EMERGENCY DEPT VISIT LOW MDM: CPT | Performed by: EMERGENCY MEDICINE

## 2020-03-02 PROCEDURE — 99284 EMERGENCY DEPT VISIT MOD MDM: CPT | Mod: Z6 | Performed by: EMERGENCY MEDICINE

## 2020-03-02 RX ADMIN — LIDOCAINE HYDROCHLORIDE 30 ML: 20 SOLUTION ORAL; TOPICAL at 10:12

## 2020-03-02 NOTE — ED AVS SNAPSHOT
[facility] Emergency Department  [address] [place] [address]  Phone:  [phone]  Fax:  [phone]                                    Crescencio Moncada   MRN: [id number]    Department:  [facility] Emergency Department   Date of Visit:  3/2/2020           After Visit Summary Signature Page    I have received my discharge instructions, and my questions have been answered. I have discussed any challenges I see with this plan with the nurse or doctor.    ..........................................................................................................................................  Patient/Patient Representative Signature      ..........................................................................................................................................  Patient Representative Print Name and Relationship to Patient    ..................................................               ................................................  Date                                   Time    ..........................................................................................................................................  Reviewed by Signature/Title    ...................................................              ..............................................  Date                                               Time          22EPIC Rev 08/18

## 2020-03-02 NOTE — TELEPHONE ENCOUNTER
Spoke to patient.     Had pains for a year and a half.   Tight sharp burning feeling in stomach area/ribs.     Fatigue really bad right now.   Nauseated and feeling sick.   Headache.   Tried prilosec.   Pain comes and goes.   Food sometimes help.   Being active helps.   Left side of stomach.   Episodes where he feels like stuff comes up and goes back down.   States it is white/mucous.  States they last throughout the whole day.   Happens every day.     When laying down it use to be okay.   But now can't even sleep.     RN and PCP recommened ED. RN advised that patient could come to office but would likely send to ED anyway.     Patient states he will think about it and call us back.     Kayleigh Avalos, DEEPAK BSN      Additional Information    Negative: Passed out (i.e., fainted, collapsed and was not responding)    Negative: Shock suspected (e.g., cold/pale/clammy skin, too weak to stand, low BP, rapid pulse)    Negative: Sounds like a life-threatening emergency to the triager    Negative: Chest pain    Pain is mainly in upper abdomen (if needed ask: 'is it mainly above the belly button?')    Negative: Passed out (i.e., fainted, collapsed and was not responding)    Negative: Shock suspected (e.g., cold/pale/clammy skin, too weak to stand, low BP, rapid pulse)    Negative: Visible sweat on face or sweat is dripping down    Negative: Chest pain    Negative: SEVERE abdominal pain (e.g., excruciating)    Negative: Pain lasting > 10 minutes and over 50 years old    Negative: Pain lasting > 10 minutes and over 40 years old and associated chest, arm, neck, upper back, or jaw pain    Negative: Pain lasting > 10 minutes and over 35 years old and at least one cardiac risk factor    Negative: Pain lasting > 10 minutes and history of heart disease (i.e., heart attack, bypass surgery, angina, angioplasty, CHF)    Negative: Recent injury to the abdomen    Negative: Vomiting red blood or black (coffee ground) material    Negative:  Bloody, black, or tarry bowel movements    Negative: Pregnant > 24 weeks and hand or face swelling    Constant abdominal pain lasting > 2 hours    Protocols used: ABDOMINAL PAIN - MALE-A-OH, ABDOMINAL PAIN - UPPER-A-OH

## 2020-03-02 NOTE — ED TRIAGE NOTES
Pt has been dealing with on and off abdominal pain for years. Pt recently having increased episodes of GERD and LUQ bloating, pain making him unable to sleep and work through the pain. Pt also reports getting headaches and feeling really tired.

## 2020-03-02 NOTE — DISCHARGE INSTRUCTIONS
May resume Nexium or Prilosec.    Recommend following up with GI if this is not helpful as there are some alternative things such as diet modification or other medications that may be helpful.    May also try some TUMS or Maalox for discomfort.

## 2020-03-02 NOTE — ED PROVIDER NOTES
"  History     Chief Complaint   Patient presents with     Abdominal Pain     The history is provided by the patient.     Crescencio Moncada is a 34 year old male who presents to the emergency department for abdominal pain. Patient reports having abdominal pain for \"years\". He states having increased episodes of GERD and LUQ pain and bloating making him unable to sleep and work through the pain. He states getting headaches and so tired unable to stay awake, very weak. Patient reports his abdominal pain has been getting worse now for the last 3 days in the LUQ area. He is also having reflux symptoms. He has had a headache for the last 3 days. He has been diagnosed with acid reflux, tried Prilosec with no relief. He was taking Xantac but was informed to stop that medication, unsure why. He denies any fever or vomiting. He denies ever having an US of his gallbladder.    Allergies:  No Known Allergies    Problem List:    Patient Active Problem List    Diagnosis Date Noted     Abdominal pain, left upper quadrant 10/09/2019     Priority: Medium     Tobacco use 06/20/2019     Priority: Medium     Weakness of both legs 12/06/2018     Priority: Medium     Eosinophilic esophagitis 11/30/2018     Priority: Medium     Esophageal dysphagia 11/30/2018     Priority: Medium     Spasm 11/25/2018     Priority: Medium     Epigastric pain 08/16/2017     Priority: Medium     Liver lesion 08/16/2017     Priority: Medium     Syncope, unspecified syncope type 08/16/2017     Priority: Medium     Injury of right hand, initial encounter 01/09/2017     Priority: Medium     Pain of right hand 01/09/2017     Priority: Medium     Need for diphtheria-tetanus-pertussis (Tdap) vaccine, adult/adolescent 01/09/2017     Priority: Medium        Past Medical History:    Past Medical History:   Diagnosis Date     Tobacco use 6/20/2019       Past Surgical History:    Past Surgical History:   Procedure Laterality Date     COLONOSCOPY WITH CO2 INSUFFLATION N/A " 2017    Procedure: COLONOSCOPY WITH CO2 INSUFFLATION;  EGD, Colonoscopy, Dr Correia referring, BMI 28.11, [facility] ER fax; [phone];  Surgeon: Amber Lowry MD;  Location: [facility]     COMBINED ESOPHAGOSCOPY, GASTROSCOPY, DUODENOSCOPY (EGD) WITH CO2 INSUFFLATION N/A 2017    Procedure: COMBINED ESOPHAGOSCOPY, GASTROSCOPY, DUODENOSCOPY (EGD) WITH CO2 INSUFFLATION;  EGD, Colonoscopy, Dr Correai referring, BMI 28.11, [facility] ER fax; [phone];  Surgeon: Amber Lowry MD;  Location: [facility]     ESOPHAGOSCOPY, GASTROSCOPY, DUODENOSCOPY (EGD), COMBINED N/A 2017    Procedure: COMBINED ESOPHAGOSCOPY, GASTROSCOPY, DUODENOSCOPY (EGD), BIOPSY SINGLE OR MULTIPLE;;  Surgeon: Amber Lowry MD;  Location: [facility]     ESOPHAGOSCOPY, GASTROSCOPY, DUODENOSCOPY (EGD), COMBINED N/A 2018    Procedure: COMBINED ESOPHAGOSCOPY, GASTROSCOPY, DUODENOSCOPY (EGD), BIOPSY SINGLE OR MULTIPLE;  Surgeon: Reji Andujar MD;  Location:  GI       Family History:    Family History   Problem Relation Age of Onset     Myocardial Infarction Paternal Grandfather 46     Genetic Disorder Mother         spinal muscular atrophy gene     Genetic Disorder Sister 0        spinal muscular atrophy     Coronary Artery Disease Paternal Half-Brother        Social History:  Marital Status:   [2]  Social History     Tobacco Use     Smoking status: Former Smoker     Packs/day: 0.00     Types: Dip, chew, snus or snuff     Last attempt to quit: 2017     Years since quittin.2     Smokeless tobacco: Former User     Types: Chew     Tobacco comment: every 3 days   Substance Use Topics     Alcohol use: Yes     Comment: 4-5 beers/day, sometimes more on the weekend     Drug use: No        Medications:    No current outpatient medications on file.        Review of Systems   All other systems reviewed and are negative.      Physical Exam          Physical Exam  Vitals signs and nursing note reviewed.    Constitutional:       Appearance: He is well-developed.   HENT:      Head: Normocephalic.      Nose: Nose normal.   Eyes:      General: No scleral icterus.     Conjunctiva/sclera: Conjunctivae normal.   Neck:      Musculoskeletal: Normal range of motion and neck supple.   Cardiovascular:      Rate and Rhythm: Normal rate and regular rhythm.      Heart sounds: Normal heart sounds.   Pulmonary:      Effort: Pulmonary effort is normal.      Breath sounds: Normal breath sounds.   Abdominal:      Palpations: Abdomen is soft.      Tenderness: There is no abdominal tenderness.   Musculoskeletal: Normal range of motion.      Comments: Soft mobile annular nodular on left lateral chest wall 1 cm in diameter.   Skin:     General: Skin is warm and dry.      Coloration: Skin is not pale.   Neurological:      Mental Status: He is alert.      Motor: No abnormal muscle tone.   Psychiatric:         Behavior: Behavior normal.         ED Course        Procedures               Critical Care time:  none               Results for orders placed or performed during the hospital encounter of 03/02/20 (from the past 24 hour(s))   CBC with platelets differential   Result Value Ref Range    WBC 6.7 4.0 - 11.0 10e9/L    RBC Count 5.04 4.4 - 5.9 10e12/L    Hemoglobin 15.5 13.3 - 17.7 g/dL    Hematocrit 44.3 40.0 - 53.0 %    MCV 88 78 - 100 fl    MCH 30.8 26.5 - 33.0 pg    MCHC 35.0 31.5 - 36.5 g/dL    RDW 12.1 10.0 - 15.0 %    Platelet Count 288 150 - 450 10e9/L    Diff Method Automated Method     % Neutrophils 41.5 %    % Lymphocytes 40.2 %    % Monocytes 8.9 %    % Eosinophils 7.6 %    % Basophils 1.5 %    % Immature Granulocytes 0.3 %    Nucleated RBCs 0 0 /100    Absolute Neutrophil 2.8 1.6 - 8.3 10e9/L    Absolute Lymphocytes 2.7 0.8 - 5.3 10e9/L    Absolute Monocytes 0.6 0.0 - 1.3 10e9/L    Absolute Basophils 0.1 0.0 - 0.2 10e9/L    Abs Immature Granulocytes 0.0 0 - 0.4 10e9/L    Absolute Nucleated RBC 0.0    Comprehensive metabolic  panel   Result Value Ref Range    Sodium 140 133 - 144 mmol/L    Potassium 3.9 3.4 - 5.3 mmol/L    Chloride 110 (H) 94 - 109 mmol/L    Carbon Dioxide 24 20 - 32 mmol/L    Anion Gap 6 3 - 14 mmol/L    Glucose 89 70 - 99 mg/dL    Urea Nitrogen 16 7 - 30 mg/dL    Creatinine 0.84 0.66 - 1.25 mg/dL    GFR Estimate >90 >60 mL/min/[1.73_m2]    GFR Estimate If Black >90 >60 mL/min/[1.73_m2]    Calcium 8.8 8.5 - 10.1 mg/dL    Bilirubin Total 0.4 0.2 - 1.3 mg/dL    Albumin 3.9 3.4 - 5.0 g/dL    Protein Total 7.0 6.8 - 8.8 g/dL    Alkaline Phosphatase 77 40 - 150 U/L    ALT 35 0 - 70 U/L    AST 21 0 - 45 U/L   Lipase   Result Value Ref Range    Lipase 105 73 - 393 U/L       Medications   lidocaine (XYLOCAINE) 2 % 15 mL, alum & mag hydroxide-simethicone (MAALOX  ES) 15 mL GI Cocktail (30 mLs Oral Given 3/2/20 1012)       Assessments & Plan (with Medical Decision Making)  34-year-old male presents with nonspecific abdominal pain and sensations of reflux.  He seen [facility] in the past for abdominal complaints, he has had 2 upper endoscopies.  This is previously shown eosinophilic esophagitis.  He was previously on Nexium and did not feel like this helped.  Exam here is benign without any significant tenderness.  Labs are unremarkable.  Have recommended he consider resuming Nexium or Prilosec.  May use antacids.  Referred back to GI for further treatment and recommendations.     I have reviewed the nursing notes.    I have reviewed the findings, diagnosis, plan and need for follow up with the patient.       There are no discharge medications for this patient.      Final diagnoses:   Eosinophilic esophagitis   Abdominal pain, epigastric     This document serves as a record of services personally performed by Mauro Milton MD. It was created on their behalf by Kalina Weber, a trained medical scribe. The creation of this record is based on the provider's personal observations and the statements of the patient. This document has  been checked and approved by the attending provider.    Note: Chart documentation done in part with Dragon Voice Recognition software. Although reviewed after completion, some word and grammatical errors may remain.    3/2/2020   [facility] EMERGENCY DEPARTMENT     Mauro Milton MD  03/02/20 1144

## 2020-03-02 NOTE — TELEPHONE ENCOUNTER
Please call patient Monday morning.       MAGALIS De Leon CNP  Questions or concerns please feel free to send me a Wylio message or call me  Phone : [phone]

## 2020-03-10 ENCOUNTER — HEALTH MAINTENANCE LETTER (OUTPATIENT)
Age: 35
End: 2020-03-10

## 2020-04-24 NOTE — PROGRESS NOTES
"Crescencio Moncada is a 34 year old male who is being evaluated via a billable telephone visit.      The patient has been notified of following:     \"This telephone visit will be conducted via a call between you and your physician/provider. We have found that certain health care needs can be provided without the need for a physical exam.  This service lets us provide the care you need with a short phone conversation.  If a prescription is necessary we can send it directly to your pharmacy.  If lab work is needed we can place an order for that and you can then stop by our lab to have the test done at a later time.    Telephone visits are billed at different rates depending on your insurance coverage. During this emergency period, for some insurers they may be billed the same as an in-person visit.  Please reach out to your insurance provider with any questions.    If during the course of the call the physician/provider feels a telephone visit is not appropriate, you will not be charged for this service.\"    Patient has given verbal consent for Telephone visit?  Yes    How would you like to obtain your AVS? MyChart    Subjective     Crescencio Moncada is a 34 year old male who presents to clinic today for the following health issues:    HPI     Patient wanting a phone visit for \"stomach issues.\"    ABDOMINAL   PAIN     Onset: 3-4 months    Description:   Character: Sharp and Squeezing  Location: \"left side under rib\"  Radiation: None    Intensity: moderate    Progression of Symptoms:  worsening    Accompanying Signs & Symptoms:  Fever/Chills?: no   Gas/Bloating: YES - feels swollen  Nausea: YES- sometimes   Vomitting: no   Diarrhea?: no   Constipation:no   Dysuria or Hematuria: no    History:   Trauma: no   Previous similar pain: YES   Previous tests done: CT    Precipitating factors:   Does the pain change with:     Food: YES- sometimes will get a sharp pain after eating - anything alcohol related makes him instantly " sick    BM: no     Urination: no     Alleviating factors:  Certain positions sometimes     Therapies Tried and outcome: NA    LMP:  NA     Under left ribs he has a squeezing, swelling sensation, cramping feeling at times. Sometimes the pain make him feel shortness of breath, weak, dizzy. Does not seem to correlate with eating but sometimes it does. No real pattern found by him. He has a lot of white mucus especially in the morning that he hacks up. No sensation of burning in his chest or throat, no sour taste. He tried all of the PPIs but each of them caused him to have bone pain and once he stopped them the pain resolved. He tried Zantac and Pepcid and they helped some but has not taken them on a regular basis. He  GI - Prilosec, no steroids, lost insurance    The only foods that seem to make it worse are ice cream bad, popcorn which he thinks may be from the butter but he is able to drink milk so not sure if it is dairy related. The pain flares when he sits down, is better when moving around okay. Laying down on left side sometimes makes it better but not always, sometimes lying on the right side makes it better. He can eat burgers, salads, spicy foods and it does not cause the pain. He was drinking 2-3 beers per day but was told at one point to stop alcohol altogether because of the symptoms and then he tried a beer one night and it made him super sick, nausea and vomiting. He stopped drinking beer after that. He tried wine one night and the same thing happened so he does not touch alcohol anymore.    Past EGD showed eosinophilic esophagitis. He saw GI and they recommended start with PPI. Never was prescribed a steroid but they discussed it. Lost insurance so he was not able to follow up. Symptoms have worsened and he is worried so he wants to figure things out.         Patient Active Problem List   Diagnosis     Injury of right hand, initial encounter     Pain of right hand     Need for  diphtheria-tetanus-pertussis (Tdap) vaccine, adult/adolescent     Epigastric pain     Liver lesion     Syncope, unspecified syncope type     Spasm     Eosinophilic esophagitis     Esophageal dysphagia     Weakness of both legs     Tobacco use     Abdominal pain, left upper quadrant     Past Surgical History:   Procedure Laterality Date     COLONOSCOPY WITH CO2 INSUFFLATION N/A 2017    Procedure: COLONOSCOPY WITH CO2 INSUFFLATION;  EGD, Colonoscopy, Dr Correia referring, BMI 28.11, [facility] fax; [phone];  Surgeon: Amber Lowry MD;  Location: [facility]     COMBINED ESOPHAGOSCOPY, GASTROSCOPY, DUODENOSCOPY (EGD) WITH CO2 INSUFFLATION N/A 2017    Procedure: COMBINED ESOPHAGOSCOPY, GASTROSCOPY, DUODENOSCOPY (EGD) WITH CO2 INSUFFLATION;  EGD, Colonoscopy, Dr Correia referring, BMI 28.11, [facility] fax; [phone];  Surgeon: Amber Lowry MD;  Location: [facility]     ESOPHAGOSCOPY, GASTROSCOPY, DUODENOSCOPY (EGD), COMBINED N/A 2017    Procedure: COMBINED ESOPHAGOSCOPY, GASTROSCOPY, DUODENOSCOPY (EGD), BIOPSY SINGLE OR MULTIPLE;;  Surgeon: Amber Lowry MD;  Location: [facility]     ESOPHAGOSCOPY, GASTROSCOPY, DUODENOSCOPY (EGD), COMBINED N/A 2018    Procedure: COMBINED ESOPHAGOSCOPY, GASTROSCOPY, DUODENOSCOPY (EGD), BIOPSY SINGLE OR MULTIPLE;  Surgeon: Reji Andujar MD;  Location:  GI       Social History     Tobacco Use     Smoking status: Former Smoker     Packs/day: 0.00     Types: Dip, chew, snus or snuff     Last attempt to quit: 2017     Years since quittin.4     Smokeless tobacco: Former User     Types: Chew     Tobacco comment: every 3 days   Substance Use Topics     Alcohol use: Yes     Comment: 4-5 beers/day, sometimes more on the weekend     Family History   Problem Relation Age of Onset     Myocardial Infarction Paternal Grandfather 46     Genetic Disorder Mother         spinal muscular atrophy gene     Genetic Disorder Sister 0        spinal  muscular atrophy     Coronary Artery Disease Paternal Half-Brother          No current outpatient medications on file.     No Known Allergies  BP Readings from Last 3 Encounters:   02/11/20 120/73   10/09/19 116/70   06/20/19 125/78    Wt Readings from Last 3 Encounters:   02/11/20 102.1 kg (225 lb)   10/09/19 102.5 kg (226 lb)   06/20/19 99.7 kg (219 lb 14.4 oz)                    Reviewed and updated as needed this visit by Provider         Review of Systems   ROS COMP: Constitutional, HEENT, cardiovascular, pulmonary, GI, , musculoskeletal, neuro, skin, endocrine and psych systems are negative, except as otherwise noted.       Objective   Reported vitals:  There were no vitals taken for this visit.   healthy, alert and no distress  PSYCH: Alert and oriented times 3; coherent speech, normal   rate and volume, able to articulate logical thoughts, able   to abstract reason  RESP: No cough, no audible wheezing, able to talk in full sentences  Remainder of exam unable to be completed due to telephone visits    Diagnostic Test Results:  Labs reviewed in Epic        Assessment/Plan:  1. Eosinophilic esophagitis  I recommended he schedule visit with GI to discuss treatment of eosinophilic esophagitis. He did not tolerate any of the PPIs due to side effect of bone pain with them. He has tried H2 blockers intermittently which help a little but has not taken them consistently. Discussed with GI last time a little about a topical steroid but were going to see how he did with PPI first. Also discussed seeing the allergist for testing if GI thinks this would be helpful. Patient agreeable to plan.   - ALLERGY/ASTHMA ADULT REFERRAL  - GASTROENTEROLOGY ADULT REF CONSULT ONLY; Future    No follow-ups on file.      Phone call duration:  12 minutes    MAGALIS Ortega CNP

## 2020-04-27 ENCOUNTER — VIRTUAL VISIT (OUTPATIENT)
Dept: FAMILY MEDICINE | Facility: OTHER | Age: 35
End: 2020-04-27
Payer: COMMERCIAL

## 2020-04-27 DIAGNOSIS — K20.0 EOSINOPHILIC ESOPHAGITIS: Primary | ICD-10-CM

## 2020-04-27 PROCEDURE — 99213 OFFICE O/P EST LOW 20 MIN: CPT | Mod: 95 | Performed by: STUDENT IN AN ORGANIZED HEALTH CARE EDUCATION/TRAINING PROGRAM

## 2020-04-30 ENCOUNTER — VIRTUAL VISIT (OUTPATIENT)
Dept: GASTROENTEROLOGY | Facility: CLINIC | Age: 35
End: 2020-04-30
Attending: STUDENT IN AN ORGANIZED HEALTH CARE EDUCATION/TRAINING PROGRAM

## 2020-04-30 DIAGNOSIS — K20.0 EOSINOPHILIC ESOPHAGITIS: Primary | ICD-10-CM

## 2020-04-30 PROCEDURE — 99204 OFFICE O/P NEW MOD 45 MIN: CPT | Mod: TEL | Performed by: INTERNAL MEDICINE

## 2020-04-30 RX ORDER — LANSOPRAZOLE 30 MG/1
30 CAPSULE, DELAYED RELEASE ORAL 2 TIMES DAILY
Qty: 30 CAPSULE | Refills: 3 | Status: SHIPPED | OUTPATIENT
Start: 2020-04-30 | End: 2020-07-27

## 2020-04-30 NOTE — PROGRESS NOTES
"Crescencio Moncada is a 34 year old male who is being evaluated via a billable telephone visit.      The patient has been notified of following:     \"This telephone visit will be conducted via a call between you and your physician/provider. We have found that certain health care needs can be provided without the need for a physical exam.  This service lets us provide the care you need with a short phone conversation.  If a prescription is necessary we can send it directly to your pharmacy.  If lab work is needed we can place an order for that and you can then stop by our lab to have the test done at a later time.    Telephone visits are billed at different rates depending on your insurance coverage. During this emergency period, for some insurers they may be billed the same as an in-person visit.  Please reach out to your insurance provider with any questions.    If during the course of the call the physician/provider feels a telephone visit is not appropriate, you will not be charged for this service.\"    Patient has given verbal consent for Telephone visit?  Yes    How would you like to obtain your AVS? Aba    Phone call duration:    Patient was found to have EoE on endoscopy in 2018 - was initially tried on PPI but states they all made his joints hurt - went through 4 different kinds and they all caused the same reaction, generally about 2 weeks after starting the medications.  Has been using pepcid/zantac as needed for reflux which he says helps that but doesn't help his dysphagia.  Dysphagia is unchanged from a few years ago - no issues with liquids and soft foods but this limits his diet.  At the time of his diagnosis, he was started on a PPI and told to follow-up for a repeat EGD in 6 weeks, however, he lost his insurance so was lost to follow-up.    No history of seasonal allergies or asthma.  Interestingly - does get a rash on his hands when his reflux symptoms are worse - has been seen by dermatology " regarding this and was thought to be a dermatitis.  Doesn't seem to be a seasonal change in his symptoms.    Quit smoking which hasn't helped his symptoms.  Recently quit drinking beer because it caused his reflux to be much worse.    No weight loss - is gaining weight.    Rare NSAID use.    Family history: no GI malignancies    Past Medical History:   Diagnosis Date     Tobacco use 2019       Past Surgical History:   Procedure Laterality Date     COLONOSCOPY WITH CO2 INSUFFLATION N/A 2017    Procedure: COLONOSCOPY WITH CO2 INSUFFLATION;  EGD, Colonoscopy, Dr Correia referring, BMI 28.11, [facility] ER fax; [phone];  Surgeon: Amber Lowry MD;  Location: [facility]     COMBINED ESOPHAGOSCOPY, GASTROSCOPY, DUODENOSCOPY (EGD) WITH CO2 INSUFFLATION N/A 2017    Procedure: COMBINED ESOPHAGOSCOPY, GASTROSCOPY, DUODENOSCOPY (EGD) WITH CO2 INSUFFLATION;  EGD, Colonoscopy, Dr Correia referring, BMI 28.11, [facility] ER fax; [phone];  Surgeon: Amber Lowry MD;  Location: [facility]     ESOPHAGOSCOPY, GASTROSCOPY, DUODENOSCOPY (EGD), COMBINED N/A 2017    Procedure: COMBINED ESOPHAGOSCOPY, GASTROSCOPY, DUODENOSCOPY (EGD), BIOPSY SINGLE OR MULTIPLE;;  Surgeon: Amber Lowry MD;  Location: [facility]     ESOPHAGOSCOPY, GASTROSCOPY, DUODENOSCOPY (EGD), COMBINED N/A 2018    Procedure: COMBINED ESOPHAGOSCOPY, GASTROSCOPY, DUODENOSCOPY (EGD), BIOPSY SINGLE OR MULTIPLE;  Surgeon: Reji Andujar MD;  Location:  GI       Family History   Problem Relation Age of Onset     Myocardial Infarction Paternal Grandfather 46     Genetic Disorder Mother         spinal muscular atrophy gene     Genetic Disorder Sister 0        spinal muscular atrophy     Coronary Artery Disease Paternal Half-Brother        Social History     Tobacco Use     Smoking status: Former Smoker     Packs/day: 0.00     Types: Dip, chew, snus or snuff     Last attempt to quit: 2017     Years since quittin.4      Smokeless tobacco: Former User     Types: Chew     Tobacco comment: every 3 days   Substance Use Topics     Alcohol use: Yes     Comment: 4-5 beers/day, sometimes more on the weekend       Assessment and Plan:    1. Eosinophilic esophagitis - interestingly with normal appearance of esophagus at the time of diagnosis with elevated Eosinophils (>50/hpf) on biopsy.  Has self discontinued all PPIs as they caused him joint pain. Unfortunately, has not had a follow-up EGD due to insurance issues. Today we discussed treatment options for EoE including trial of a different PPI, swallowed steroids and 6 food elimination diet.  Patient is very hesitant to start steroids as he is concerned about possible systemic effects (although did discuss that risk is small).  He would like to try a different PPI to see if he doesn't have a reaction - hasn't tried lansoprazole yet - will start twice daily with plan for repeat EGD in 6 weeks.  Has had procedures with conscious sedation and MAC in the past - patient prefers MAC.     RTC 3 months with EGD in 6 weeks.    Total phone call time: 24 minutes between 3 calls (patient kept getting disconnected). Total time spent including chart review and care coordination - 30 minutes    Tiffany Ayala DO

## 2020-04-30 NOTE — PROGRESS NOTES
"Crescencio Moncada is a 34 year old male who is being evaluated via a billable telephone visit.      The patient has been notified of following:     \"This telephone visit will be conducted via a call between you and your physician/provider. We have found that certain health care needs can be provided without the need for a physical exam.  This service lets us provide the care you need with a short phone conversation.  If a prescription is necessary we can send it directly to your pharmacy.  If lab work is needed we can place an order for that and you can then stop by our lab to have the test done at a later time.    Telephone visits are billed at different rates depending on your insurance coverage. During this emergency period, for some insurers they may be billed the same as an in-person visit.  Please reach out to your insurance provider with any questions.    If during the course of the call the physician/provider feels a telephone visit is not appropriate, you will not be charged for this service.\"    Patient has given verbal consent for Telephone visit?  Yes    How would you like to obtain your AVS? Liamhart    Phone call duration:    Molina Carlos MA        "

## 2020-04-30 NOTE — PATIENT INSTRUCTIONS
Start taking prevacid twice a day - take on an empty stomach and eat 30 to 60 minutes later.  If you have a reaction to it, please let us know.    We will set you up for a repeat endoscopy in about 6 weeks to monitor your response to the medication.

## 2020-05-01 ENCOUNTER — TELEPHONE (OUTPATIENT)
Dept: GASTROENTEROLOGY | Facility: CLINIC | Age: 35
End: 2020-05-01

## 2020-05-01 NOTE — TELEPHONE ENCOUNTER
5/1 Provided phone number [phone] to schedule in about 3 months (around 7/30/2020).    Tanisha De La Rosa   Procedure    Ortho/Sports Med/Ent/Eye   [facility]   [phone]

## 2020-06-08 ENCOUNTER — HOSPITAL ENCOUNTER (OUTPATIENT)
Facility: AMBULATORY SURGERY CENTER | Age: 35
End: 2020-06-08
Attending: INTERNAL MEDICINE

## 2020-06-08 DIAGNOSIS — Z11.59 ENCOUNTER FOR SCREENING FOR OTHER VIRAL DISEASES: Primary | ICD-10-CM

## 2020-07-01 NOTE — TELEPHONE ENCOUNTER
57/1 2nd attempt Provided phone number [phone] to schedule in about 3 months (around 7/30/2020).    Tanisha De La Rosa   Procedure    Ortho/Sports Med/Ent/Eye   [facility]   [phone]

## 2020-07-06 ENCOUNTER — TELEPHONE (OUTPATIENT)
Dept: GASTROENTEROLOGY | Facility: CLINIC | Age: 35
End: 2020-07-06

## 2020-07-07 NOTE — TELEPHONE ENCOUNTER
Pt rescheduled his procedure for 7/28.    Rosa Hunter RN  Gastroenterology Care Coordinator  [place], [place]

## 2020-07-21 ASSESSMENT — MIFFLIN-ST. JEOR: SCORE: 2018.08

## 2020-07-24 NOTE — PROGRESS NOTES
[address]  [facility] [address]  [phone]  Dept: [phone]    PRE-OP EVALUATION:  Today's date: 2020    Crescencio Moncada (: 1985) presents for pre-operative evaluation assessment as requested by Dr. Ayala.  He requires evaluation and anesthesia risk assessment prior to undergoing surgery/procedure for treatment of Eosinophilic esophagitis  .    Proposed Surgery/ Procedure: ESOPHAGOGASTRODUODENOSCOPY, WITH CO2 INSUFFLATION   Date of Surgery/ Procedure: 2020  Time of Surgery/ Procedure: 8am  Hospital/Surgical Facility: [facility]  Surgery Fax Number: Note does not need to be faxed, will be available electronically in Epic.  Primary Physician: Apryl Correia  Type of Anesthesia Anticipated: Monitor Anesthesia Care     Preoperative Questionnaire:   No - Have you ever had a heart attack or stroke?  No - Have you ever had surgery on your heart or blood vessels, such as a stent, coronary (heart) bypass, or surgery on an artery in the head, neck, heart, or legs?  No - Do you have chest pain when you are physically active?  No - Do you have a history of heart failure?  No - Do you currently have a cold, bronchitis, or symptoms of other respiratory (head and chest) infections?  No - Do you have a cough, shortness of breath, or wheezing?  No - Do you or anyone in your family have a history of blood clots?  No - Do you or anyone in your family have a serious bleeding problem, such as long-lasting bleeding after surgeries or cuts?  No - Have you ever had anemia or been told to take iron pills?  No - Have you had any abnormal blood loss such as black, tarry or bloody stools, or abnormal vaginal bleeding?  No - Have you ever had a blood transfusion?  Yes - Are you willing to have a blood transfusion if it is medically needed before, during, or after your surgery?  No - Have you or anyone in your family ever had problems with  anesthesia (sedation for surgery)?  No - Do you have sleep apnea, excessive snoring, or daytime drowsiness?   No - Do you have any artifical heart valves or other implanted medical devices, such as a pacemaker, defibrillator, or continuous glucose monitor?  No - Do you have any artifical joints?  No - Are you allergic to latex?  No - Is there any chance that you may be pregnant?    Patient has a Health Care Directive or Living Will:  NO    HPI:     HPI related to upcoming procedure: Started to have symptoms of reflux about 2.5 years ago.  Has trialed PPIs.  Needs to have repeat scope done to see if it improved with recent PPI.  Still has symptoms daily.      Abdominal pain:  Has been going on for about 2 months. He isn't sure if it is related to his upper GI issues.  He hasn't talked with GI about this.  No bowel movement or urinary concerns with this.  Nothing makes it better or worse.  It is always there.  Seems to be on the left upper side, sometimes feels like he just needs to push on it.  No chest pain or shortness of breath.     Left arm weakness:  Happens intermittently.  He notices it when he will lay down at night.  It just feels like it is weak.  No issues during the day.  He does have issues with his left foot feeling numb/tingling at times, he thinks he has had weakness in left foot but nothing consistent like the arm.  He had an infection in the past that he had MRIs done of the entire central nervous system.  He says these symptoms are nothing like that.  He thought maybe some tennis elbow but not as much of an issue.     Rash:  Developed in the last week or so from work.  He used a new grout and it got on both arms.  Very itchy.  Has tried to use topical cortisone without enough relief.     See problem list for active medical problems.  Problems all longstanding and stable, except as noted/documented.  See ROS for pertinent symptoms related to these conditions.      MEDICAL HISTORY:     Patient Active  Problem List    Diagnosis Date Noted     Abdominal pain, left upper quadrant 10/09/2019     Priority: Medium     Tobacco use 06/20/2019     Priority: Medium     Weakness of both legs 12/06/2018     Priority: Medium     Eosinophilic esophagitis 11/30/2018     Priority: Medium     Esophageal dysphagia 11/30/2018     Priority: Medium     Spasm 11/25/2018     Priority: Medium     Epigastric pain 08/16/2017     Priority: Medium     Liver lesion 08/16/2017     Priority: Medium     Syncope, unspecified syncope type 08/16/2017     Priority: Medium     Injury of right hand, initial encounter 01/09/2017     Priority: Medium     Pain of right hand 01/09/2017     Priority: Medium     Need for diphtheria-tetanus-pertussis (Tdap) vaccine, adult/adolescent 01/09/2017     Priority: Medium      Past Medical History:   Diagnosis Date     Tobacco use 6/20/2019     Past Surgical History:   Procedure Laterality Date     COLONOSCOPY WITH CO2 INSUFFLATION N/A 9/11/2017    Procedure: COLONOSCOPY WITH CO2 INSUFFLATION;  EGD, Colonoscopy, Dr Correia referring, BMI 28.11, [facility] fax; [phone];  Surgeon: Amber Lowry MD;  Location: [facility]     COMBINED ESOPHAGOSCOPY, GASTROSCOPY, DUODENOSCOPY (EGD) WITH CO2 INSUFFLATION N/A 9/11/2017    Procedure: COMBINED ESOPHAGOSCOPY, GASTROSCOPY, DUODENOSCOPY (EGD) WITH CO2 INSUFFLATION;  EGD, Colonoscopy, Dr Correia referring, BMI 28.11, [facility] fax; [phone];  Surgeon: Amber Lowry MD;  Location: [facility]     ESOPHAGOSCOPY, GASTROSCOPY, DUODENOSCOPY (EGD), COMBINED N/A 9/11/2017    Procedure: COMBINED ESOPHAGOSCOPY, GASTROSCOPY, DUODENOSCOPY (EGD), BIOPSY SINGLE OR MULTIPLE;;  Surgeon: Amber Lowry MD;  Location: [facility]     ESOPHAGOSCOPY, GASTROSCOPY, DUODENOSCOPY (EGD), COMBINED N/A 11/27/2018    Procedure: COMBINED ESOPHAGOSCOPY, GASTROSCOPY, DUODENOSCOPY (EGD), BIOPSY SINGLE OR MULTIPLE;  Surgeon: Reji Andujar MD;  [facility]     Current  "Outpatient Medications   Medication Sig Dispense Refill     triamcinolone (KENALOG) 0.1 % external ointment Apply topically 2 times daily 30 g 0     OTC products: None, except as noted above    No Known Allergies   Latex Allergy: NO    Social History     Tobacco Use     Smoking status: Former Smoker     Packs/day: 0.00     Types: Dip, chew, snus or snuff     Last attempt to quit: 2017     Years since quittin.6     Smokeless tobacco: Former User     Types: Chew     Tobacco comment: every 3 days   Substance Use Topics     Alcohol use: Yes     Comment: 4-5 beers/day, sometimes more on the weekend     History   Drug Use No       REVIEW OF SYSTEMS:   Constitutional, neuro, ENT, endocrine, pulmonary, cardiac, gastrointestinal, genitourinary, musculoskeletal, integument and psychiatric systems are negative, except as otherwise noted.    EXAM:   /66   Pulse 71   Temp 97.9  F (36.6  C) (Temporal)   Resp 16   Ht 1.854 m (6' 1\")   Wt 106.1 kg (234 lb)   SpO2 97%   BMI 30.87 kg/m      GENERAL APPEARANCE: healthy, alert and no distress     EYES: EOMI,  PERRL     HENT: ear canals and TM's normal and nose and mouth without ulcers or lesions     NECK: no adenopathy, no asymmetry, masses, or scars and thyroid normal to palpation     RESP: lungs clear to auscultation - no rales, rhonchi or wheezes     CV: regular rates and rhythm, normal S1 S2, no S3 or S4 and no murmur, click or rub     ABDOMEN: soft, non-distended, mildly tender to palpation LUQ/LLQ, bowel sounds normal, no rebound or abdominal rigidity.      MS: extremities normal- no gross deformities noted, no evidence of inflammation in joints, FROM in all extremities.  5/5 upper extremity strength bilaterally, full active ROM of the cervical spine without symptoms, non-tender to palpation over the left shoulder, humerus, medial and lateral epicondyle, forearm.  1+ biceps reflex bilaterally      SKIN: Bilateral forearms multiple erythematous papules.      " NEURO: Normal strength and tone, sensory exam grossly normal, mentation intact and speech normal     PSYCH: mentation appears normal. and affect normal/bright     LYMPHATICS: No cervical adenopathy    DIAGNOSTICS:   Labs:    Component      Latest Ref Rng & Units 7/27/2020   WBC      4.0 - 11.0 10e9/L 6.9   RBC Count      4.4 - 5.9 10e12/L 5.16   Hemoglobin      13.3 - 17.7 g/dL 15.7   Hematocrit      40.0 - 53.0 % 45.3   MCV      78 - 100 fl 88   MCH      26.5 - 33.0 pg 30.4   MCHC      31.5 - 36.5 g/dL 34.7   RDW      10.0 - 15.0 % 13.0   Platelet Count      150 - 450 10e9/L 297   % Neutrophils      % 31.0   % Lymphocytes      % 47.4   % Monocytes      % 11.5   % Eosinophils      % 8.9   % Basophils      % 1.2   Absolute Neutrophil      1.6 - 8.3 10e9/L 2.2   Absolute Lymphocytes      0.8 - 5.3 10e9/L 3.3   Absolute Monocytes      0.0 - 1.3 10e9/L 0.8   Absolute Eosinophils      0.0 - 0.7 10e9/L 0.6   Absolute Basophils      0.0 - 0.2 10e9/L 0.1   Diff Method       Automated Method   Sodium      133 - 144 mmol/L 141   Potassium      3.4 - 5.3 mmol/L 4.1   Chloride      94 - 109 mmol/L 109   Carbon Dioxide      20 - 32 mmol/L 27   Anion Gap      3 - 14 mmol/L 5   Glucose      70 - 99 mg/dL 72   Urea Nitrogen      7 - 30 mg/dL 12   Creatinine      0.66 - 1.25 mg/dL 0.97   GFR Estimate      >60 mL/min/1.73:m2 >90   GFR Estimate If Black      >60 mL/min/1.73:m2 >90   Calcium      8.5 - 10.1 mg/dL 8.7   Bilirubin Total      0.2 - 1.3 mg/dL 0.4   Albumin      3.4 - 5.0 g/dL 4.0   Protein Total      6.8 - 8.8 g/dL 6.9   Alkaline Phosphatase      40 - 150 U/L 80   ALT      0 - 70 U/L 35   AST      0 - 45 U/L 20   Lipase      73 - 393 U/L 118       Recent Labs   Lab Test 03/02/20  1017 10/09/19  0929  12/16/16  1423   HGB 15.5 15.7   < > 15.1    247   < > 259    140   < > 143   POTASSIUM 3.9 4.0   < > 4.7   CR 0.84 0.95   < > 1.01   A1C  --   --   --  5.1    < > = values in this interval not displayed.         IMPRESSION:   Reason for surgery/procedure: EGD due to persistent Upper GI symptoms despite PPI  Diagnosis/reason for consult: Preoperative examination.     The proposed surgical procedure is considered LOW risk.    REVISED CARDIAC RISK INDEX  The patient has the following serious cardiovascular risks for perioperative complications such as (MI, PE, VFib and 3  AV Block):  No serious cardiac risks  INTERPRETATION: 0 risks: Class I (very low risk - 0.4% complication rate)    The patient has the following additional risks for perioperative complications:  No identified additional risks      ICD-10-CM    1. Preop general physical exam  Z01.818    2. Eosinophilic esophagitis  K20.0    3. Abdominal pain, left upper quadrant  R10.12 CBC with platelets differential     Comprehensive metabolic panel     Lipase   4. Irritant contact dermatitis due to other chemical products  L24.5 triamcinolone (KENALOG) 0.1 % external ointment       RECOMMENDATIONS:   Rash:  - Consistent with contact dermatitis - recommended topical triamcinolone, emollient, follow-up if not improving.     Arm weakness:  - no concerns on examination, monitor symptoms, consider repeat MRI of cervical spine if symptoms persist.      Abdominal pain:  - May be due to his current issues with EE and GERD.  - Obtained labs, no concerns, not likely acute abdomen or infectious.   - Will follow-up with GI.     Cardiovascular Risk  Performs 4 METs exercise without symptoms.       --Patient is on no chronic medications    APPROVAL GIVEN to proceed with proposed procedure, without further diagnostic evaluation       Signed Electronically by: Ale Flores PA-C    Copy of this evaluation report is provided to requesting physician.    [facility] Preop Guidelines    Revised Cardiac Risk Index

## 2020-07-25 DIAGNOSIS — Z11.59 ENCOUNTER FOR SCREENING FOR OTHER VIRAL DISEASES: ICD-10-CM

## 2020-07-25 PROCEDURE — U0003 INFECTIOUS AGENT DETECTION BY NUCLEIC ACID (DNA OR RNA); SEVERE ACUTE RESPIRATORY SYNDROME CORONAVIRUS 2 (SARS-COV-2) (CORONAVIRUS DISEASE [COVID-19]), AMPLIFIED PROBE TECHNIQUE, MAKING USE OF HIGH THROUGHPUT TECHNOLOGIES AS DESCRIBED BY CMS-2020-01-R: HCPCS | Performed by: INTERNAL MEDICINE

## 2020-07-26 LAB
LABORATORY COMMENT REPORT: NORMAL
SARS-COV-2 RNA SPEC QL NAA+PROBE: NEGATIVE
SARS-COV-2 RNA SPEC QL NAA+PROBE: NORMAL
SPECIMEN SOURCE: NORMAL
SPECIMEN SOURCE: NORMAL

## 2020-07-27 ENCOUNTER — ANESTHESIA EVENT (OUTPATIENT)
Dept: SURGERY | Facility: AMBULATORY SURGERY CENTER | Age: 35
End: 2020-07-27

## 2020-07-27 ENCOUNTER — OFFICE VISIT (OUTPATIENT)
Dept: FAMILY MEDICINE | Facility: OTHER | Age: 35
End: 2020-07-27

## 2020-07-27 VITALS
BODY MASS INDEX: 31.01 KG/M2 | RESPIRATION RATE: 16 BRPM | HEART RATE: 71 BPM | OXYGEN SATURATION: 97 % | DIASTOLIC BLOOD PRESSURE: 66 MMHG | HEIGHT: 73 IN | TEMPERATURE: 97.9 F | SYSTOLIC BLOOD PRESSURE: 112 MMHG | WEIGHT: 234 LBS

## 2020-07-27 DIAGNOSIS — K20.0 EOSINOPHILIC ESOPHAGITIS: ICD-10-CM

## 2020-07-27 DIAGNOSIS — R10.12 ABDOMINAL PAIN, LEFT UPPER QUADRANT: ICD-10-CM

## 2020-07-27 DIAGNOSIS — Z01.818 PREOP GENERAL PHYSICAL EXAM: Primary | ICD-10-CM

## 2020-07-27 DIAGNOSIS — L24.5 IRRITANT CONTACT DERMATITIS DUE TO OTHER CHEMICAL PRODUCTS: ICD-10-CM

## 2020-07-27 LAB
ALBUMIN SERPL-MCNC: 4 G/DL (ref 3.4–5)
ALP SERPL-CCNC: 80 U/L (ref 40–150)
ALT SERPL W P-5'-P-CCNC: 35 U/L (ref 0–70)
ANION GAP SERPL CALCULATED.3IONS-SCNC: 5 MMOL/L (ref 3–14)
AST SERPL W P-5'-P-CCNC: 20 U/L (ref 0–45)
BASOPHILS # BLD AUTO: 0.1 10E9/L (ref 0–0.2)
BASOPHILS NFR BLD AUTO: 1.2 %
BILIRUB SERPL-MCNC: 0.4 MG/DL (ref 0.2–1.3)
BUN SERPL-MCNC: 12 MG/DL (ref 7–30)
CALCIUM SERPL-MCNC: 8.7 MG/DL (ref 8.5–10.1)
CHLORIDE SERPL-SCNC: 109 MMOL/L (ref 94–109)
CO2 SERPL-SCNC: 27 MMOL/L (ref 20–32)
CREAT SERPL-MCNC: 0.97 MG/DL (ref 0.66–1.25)
DIFFERENTIAL METHOD BLD: NORMAL
EOSINOPHIL # BLD AUTO: 0.6 10E9/L (ref 0–0.7)
EOSINOPHIL NFR BLD AUTO: 8.9 %
ERYTHROCYTE [DISTWIDTH] IN BLOOD BY AUTOMATED COUNT: 13 % (ref 10–15)
GFR SERPL CREATININE-BSD FRML MDRD: >90 ML/MIN/{1.73_M2}
GLUCOSE SERPL-MCNC: 72 MG/DL (ref 70–99)
HCT VFR BLD AUTO: 45.3 % (ref 40–53)
HGB BLD-MCNC: 15.7 G/DL (ref 13.3–17.7)
LIPASE SERPL-CCNC: 118 U/L (ref 73–393)
LYMPHOCYTES # BLD AUTO: 3.3 10E9/L (ref 0.8–5.3)
LYMPHOCYTES NFR BLD AUTO: 47.4 %
MCH RBC QN AUTO: 30.4 PG (ref 26.5–33)
MCHC RBC AUTO-ENTMCNC: 34.7 G/DL (ref 31.5–36.5)
MCV RBC AUTO: 88 FL (ref 78–100)
MONOCYTES # BLD AUTO: 0.8 10E9/L (ref 0–1.3)
MONOCYTES NFR BLD AUTO: 11.5 %
NEUTROPHILS # BLD AUTO: 2.2 10E9/L (ref 1.6–8.3)
NEUTROPHILS NFR BLD AUTO: 31 %
PLATELET # BLD AUTO: 297 10E9/L (ref 150–450)
POTASSIUM SERPL-SCNC: 4.1 MMOL/L (ref 3.4–5.3)
PROT SERPL-MCNC: 6.9 G/DL (ref 6.8–8.8)
RBC # BLD AUTO: 5.16 10E12/L (ref 4.4–5.9)
SODIUM SERPL-SCNC: 141 MMOL/L (ref 133–144)
WBC # BLD AUTO: 6.9 10E9/L (ref 4–11)

## 2020-07-27 PROCEDURE — 83690 ASSAY OF LIPASE: CPT | Performed by: PHYSICIAN ASSISTANT

## 2020-07-27 PROCEDURE — 85025 COMPLETE CBC W/AUTO DIFF WBC: CPT | Performed by: PHYSICIAN ASSISTANT

## 2020-07-27 PROCEDURE — 36415 COLL VENOUS BLD VENIPUNCTURE: CPT | Performed by: PHYSICIAN ASSISTANT

## 2020-07-27 PROCEDURE — 80053 COMPREHEN METABOLIC PANEL: CPT | Performed by: PHYSICIAN ASSISTANT

## 2020-07-27 PROCEDURE — 99214 OFFICE O/P EST MOD 30 MIN: CPT | Performed by: PHYSICIAN ASSISTANT

## 2020-07-27 RX ORDER — TRIAMCINOLONE ACETONIDE 1 MG/G
OINTMENT TOPICAL 2 TIMES DAILY
Qty: 30 G | Refills: 0 | Status: SHIPPED | OUTPATIENT
Start: 2020-07-27 | End: 2024-06-26

## 2020-07-27 RX ORDER — OXYCODONE HYDROCHLORIDE 5 MG/1
5-10 TABLET ORAL EVERY 4 HOURS PRN
Status: CANCELLED | OUTPATIENT
Start: 2020-07-27

## 2020-07-27 RX ORDER — FENTANYL CITRATE 50 UG/ML
25-50 INJECTION, SOLUTION INTRAMUSCULAR; INTRAVENOUS
Status: CANCELLED | OUTPATIENT
Start: 2020-07-27

## 2020-07-27 ASSESSMENT — MIFFLIN-ST. JEOR: SCORE: 2045.3

## 2020-07-27 NOTE — ANESTHESIA PREPROCEDURE EVALUATION
"Anesthesia Pre-Procedure Evaluation    Patient: Crescencio Moncada   MRN:     [id number] Gender:   male   Age:    34 year old :      1985        Preoperative Diagnosis: Eosinophilic esophagitis [K20.0]   Procedure(s):  ESOPHAGOGASTRODUODENOSCOPY, WITH CO2 INSUFFLATION     LABS:  CBC:   Lab Results   Component Value Date    WBC 6.9 2020    WBC 6.7 2020    HGB 15.7 2020    HGB 15.5 2020    HCT 45.3 2020    HCT 44.3 2020     2020     2020     BMP:   Lab Results   Component Value Date     2020     10/09/2019    POTASSIUM 3.9 2020    POTASSIUM 4.0 10/09/2019    CHLORIDE 110 (H) 2020    CHLORIDE 108 10/09/2019    CO2 24 2020    CO2 26 10/09/2019    BUN 16 2020    BUN 15 10/09/2019    CR 0.84 2020    CR 0.95 10/09/2019    GLC 89 2020    GLC 91 10/09/2019     COAGS: No results found for: PTT, INR, FIBR  POC: No results found for: BGM, HCG, HCGS  OTHER:   Lab Results   Component Value Date    A1C 5.1 2016    LEATHA 8.8 2020    PHOS 4.5 2018    MAG 2.2 2018    ALBUMIN 3.9 2020    PROTTOTAL 7.0 2020    ALT 35 2020    AST 21 2020    ALKPHOS 77 2020    BILITOTAL 0.4 2020    LIPASE 105 2020    AMYLASE 46 2017    TSH 1.81 2018    T4 1.06 2018    CRP <2.9 10/09/2019    SED 16 (H) 2018        Preop Vitals    BP Readings from Last 3 Encounters:   20 112/66   20 120/73   10/09/19 116/70    Pulse Readings from Last 3 Encounters:   20 71   10/09/19 70   19 67      Resp Readings from Last 3 Encounters:   20 16   10/09/19 16   19 12    SpO2 Readings from Last 3 Encounters:   20 97%   10/09/19 98%   19 98%      Temp Readings from Last 1 Encounters:   20 97.9  F (36.6  C) (Temporal)    Ht Readings from Last 1 Encounters:   20 1.854 m (6' 1\")      Wt Readings from Last  " "Encounters:   07/27/20 106.1 kg (234 lb)    Estimated body mass index is 30.08 kg/m  as calculated from the following:    Height as of this encounter: 1.854 m (6' 1\").    Weight as of this encounter: 103.4 kg (228 lb).     LDA:        Past Medical History:   Diagnosis Date     Tobacco use 6/20/2019      Past Surgical History:   Procedure Laterality Date     COLONOSCOPY WITH CO2 INSUFFLATION N/A 9/11/2017    Procedure: COLONOSCOPY WITH CO2 INSUFFLATION;  EGD, Colonoscopy, Dr Correia referring, BMI 28.11, [facility] fax; [phone];  Surgeon: Amber Lowry MD;  Location: [facility]     COMBINED ESOPHAGOSCOPY, GASTROSCOPY, DUODENOSCOPY (EGD) WITH CO2 INSUFFLATION N/A 9/11/2017    Procedure: COMBINED ESOPHAGOSCOPY, GASTROSCOPY, DUODENOSCOPY (EGD) WITH CO2 INSUFFLATION;  EGD, Colonoscopy, Dr Correia referring, BMI 28.11, [facility] fax; [phone];  Surgeon: Amber Lowry MD;  Location: [facility]     ESOPHAGOSCOPY, GASTROSCOPY, DUODENOSCOPY (EGD), COMBINED N/A 9/11/2017    Procedure: COMBINED ESOPHAGOSCOPY, GASTROSCOPY, DUODENOSCOPY (EGD), BIOPSY SINGLE OR MULTIPLE;;  Surgeon: Amber Lowry MD;  Location: [facility]     ESOPHAGOSCOPY, GASTROSCOPY, DUODENOSCOPY (EGD), COMBINED N/A 11/27/2018    Procedure: COMBINED ESOPHAGOSCOPY, GASTROSCOPY, DUODENOSCOPY (EGD), BIOPSY SINGLE OR MULTIPLE;  Surgeon: Reji Andujar MD;  [facility]      No Known Allergies              PHYSICAL EXAM:   Mental Status/Neuro: A/A/O   Airway: Facies: Feasible  Mallampati: I  Mouth/Opening: Full  TM distance: > 6 cm  Neck ROM: Full   Respiratory:   Resp. Rate: Normal     Resp. Effort: Normal      CV:   Rate: Age appropriate  Edema: None   Comments: One implant upper top right     Dental: Details                Assessment:   ASA SCORE: 2    H&P: History and physical reviewed and following examination; no interval change.    NPO Status: NPO Appropriate     Plan:   Anes. Type:  MAC   Pre-Medication: None   Induction:  " N/a   Airway: Native Airway   Access/Monitoring: PIV   Maintenance: N/a     Postop Plan:   Postop Pain: None  Postop Sedation/Airway: Not planned     PONV Management:    Prevention: Ondansetron     CONSENT: Direct conversation   Plan and risks discussed with: Patient                      Farzad Lora MD     ANESTHESIA PREOP EVALUATION    PROCEDURE: Procedure(s):  ESOPHAGOGASTRODUODENOSCOPY, WITH CO2 INSUFFLATION    HPI: Crescencio Moncada is a 34 year old male who presents for above procedure.    PAST MEDICAL HISTORY:    Past Medical History:   Diagnosis Date     Tobacco use 2019       PAST SURGICAL HISTORY:    Past Surgical History:   Procedure Laterality Date     COLONOSCOPY WITH CO2 INSUFFLATION N/A 2017    Procedure: COLONOSCOPY WITH CO2 INSUFFLATION;  EGD, Colonoscopy, Dr Correia referring, BMI 28.11, [facility] fax; [phone];  Surgeon: Amber Lowry MD;  [facility]     COMBINED ESOPHAGOSCOPY, GASTROSCOPY, DUODENOSCOPY (EGD) WITH CO2 INSUFFLATION N/A 2017    Procedure: COMBINED ESOPHAGOSCOPY, GASTROSCOPY, DUODENOSCOPY (EGD) WITH CO2 INSUFFLATION;  EGD, Colonoscopy, Dr Correia referring, BMI 28.11, [facility] fax; [phone];  Surgeon: Amber Lowry MD;  [facility]: [facility]     ESOPHAGOSCOPY, GASTROSCOPY, DUODENOSCOPY (EGD), COMBINED N/A 2017    Procedure: COMBINED ESOPHAGOSCOPY, GASTROSCOPY, DUODENOSCOPY (EGD), BIOPSY SINGLE OR MULTIPLE;;  Surgeon: Amber Lowry MD;  Location: [facility]     ESOPHAGOSCOPY, GASTROSCOPY, DUODENOSCOPY (EGD), COMBINED N/A 2018    Procedure: COMBINED ESOPHAGOSCOPY, GASTROSCOPY, DUODENOSCOPY (EGD), BIOPSY SINGLE OR MULTIPLE;  Surgeon: Reji Andujar MD;  Location:  [facility]       SOCIAL HISTORY:       Social History     Tobacco Use     Smoking status: Former Smoker     Packs/day: 0.00     Types: Dip, chew, snus or snuff     Last attempt to quit: 2017     Years since quittin.6     Smokeless tobacco: Former User      Types: Chew     Tobacco comment: every 3 days   Substance Use Topics     Alcohol use: Yes     Comment: Once a week       ALLERGIES:     No Known Allergies    MEDICATIONS:     (Not in a hospital admission)      Current Outpatient Medications   Medication Sig Dispense Refill     triamcinolone (KENALOG) 0.1 % external ointment Apply topically 2 times daily 30 g 0       Current Outpatient Medications Ordered in Epic   Medication Sig Dispense Refill     triamcinolone (KENALOG) 0.1 % external ointment Apply topically 2 times daily 30 g 0     No current Epic-ordered facility-administered medications on file.        PHYSICAL EXAM:    Vitals: T Data Unavailable, P Data Unavailable, BP Data Unavailable, R Data Unavailable, SpO2  , Weight   Wt Readings from Last 2 Encounters:   07/27/20 106.1 kg (234 lb)   07/21/20 103.4 kg (228 lb)       See doc flowsheet    NPO STATUS: see doc flowsheet    LABS:    BMP:  Recent Labs   Lab Test 03/02/20  1017      POTASSIUM 3.9   CHLORIDE 110*   CO2 24   BUN 16   CR 0.84   GLC 89   LEATHA 8.8       LFTs:   Recent Labs   Lab Test 03/02/20  1017   PROTTOTAL 7.0   ALBUMIN 3.9   BILITOTAL 0.4   ALKPHOS 77   AST 21   ALT 35       CBC:   Recent Labs   Lab Test 07/27/20  0931   WBC 6.9   RBC 5.16   HGB 15.7   HCT 45.3   MCV 88   MCH 30.4   MCHC 34.7   RDW 13.0          Coags:  No results for input(s): INR, PTT, FIBR in the last 51026 hours.    Imaging:  No orders to display       Farzad Lora MD  Anesthesiology Staff  Pager [phone]    7/27/2020  11:05 AM

## 2020-07-28 ENCOUNTER — ANESTHESIA (OUTPATIENT)
Dept: SURGERY | Facility: AMBULATORY SURGERY CENTER | Age: 35
End: 2020-07-28

## 2020-07-28 ENCOUNTER — HOSPITAL ENCOUNTER (OUTPATIENT)
Facility: AMBULATORY SURGERY CENTER | Age: 35
Discharge: HOME | End: 2020-07-28
Attending: INTERNAL MEDICINE | Admitting: INTERNAL MEDICINE

## 2020-07-28 VITALS
HEIGHT: 73 IN | HEART RATE: 61 BPM | OXYGEN SATURATION: 95 % | TEMPERATURE: 97.6 F | SYSTOLIC BLOOD PRESSURE: 108 MMHG | WEIGHT: 228 LBS | RESPIRATION RATE: 18 BRPM | BODY MASS INDEX: 30.22 KG/M2 | DIASTOLIC BLOOD PRESSURE: 69 MMHG

## 2020-07-28 DIAGNOSIS — K20.0 EOSINOPHILIC ESOPHAGITIS: Primary | ICD-10-CM

## 2020-07-28 LAB — UPPER GI ENDOSCOPY: NORMAL

## 2020-07-28 PROCEDURE — G8907 PT DOC NO EVENTS ON DISCHARG: HCPCS

## 2020-07-28 PROCEDURE — 43239 EGD BIOPSY SINGLE/MULTIPLE: CPT

## 2020-07-28 PROCEDURE — 88305 TISSUE EXAM BY PATHOLOGIST: CPT | Performed by: INTERNAL MEDICINE

## 2020-07-28 PROCEDURE — G8918 PT W/O PREOP ORDER IV AB PRO: HCPCS

## 2020-07-28 RX ORDER — ONDANSETRON 4 MG/1
4 TABLET, ORALLY DISINTEGRATING ORAL EVERY 30 MIN PRN
Status: DISCONTINUED | OUTPATIENT
Start: 2020-07-28 | End: 2020-07-29 | Stop reason: HOSPADM

## 2020-07-28 RX ORDER — ALBUTEROL SULFATE 0.83 MG/ML
2.5 SOLUTION RESPIRATORY (INHALATION) EVERY 4 HOURS PRN
Status: DISCONTINUED | OUTPATIENT
Start: 2020-07-28 | End: 2020-07-29 | Stop reason: HOSPADM

## 2020-07-28 RX ORDER — PROPOFOL 10 MG/ML
INJECTION, EMULSION INTRAVENOUS CONTINUOUS PRN
Status: DISCONTINUED | OUTPATIENT
Start: 2020-07-28 | End: 2020-07-28

## 2020-07-28 RX ORDER — ONDANSETRON 2 MG/ML
4 INJECTION INTRAMUSCULAR; INTRAVENOUS EVERY 6 HOURS PRN
Status: DISCONTINUED | OUTPATIENT
Start: 2020-07-28 | End: 2020-07-29 | Stop reason: HOSPADM

## 2020-07-28 RX ORDER — MEPERIDINE HYDROCHLORIDE 25 MG/ML
12.5 INJECTION INTRAMUSCULAR; INTRAVENOUS; SUBCUTANEOUS
Status: DISCONTINUED | OUTPATIENT
Start: 2020-07-28 | End: 2020-07-29 | Stop reason: HOSPADM

## 2020-07-28 RX ORDER — LIDOCAINE 40 MG/G
CREAM TOPICAL
Status: DISCONTINUED | OUTPATIENT
Start: 2020-07-28 | End: 2020-07-29 | Stop reason: HOSPADM

## 2020-07-28 RX ORDER — FLUMAZENIL 0.1 MG/ML
0.2 INJECTION, SOLUTION INTRAVENOUS
Status: SHIPPED | OUTPATIENT
Start: 2020-07-28 | End: 2020-07-28

## 2020-07-28 RX ORDER — SODIUM CHLORIDE, SODIUM LACTATE, POTASSIUM CHLORIDE, CALCIUM CHLORIDE 600; 310; 30; 20 MG/100ML; MG/100ML; MG/100ML; MG/100ML
INJECTION, SOLUTION INTRAVENOUS CONTINUOUS
Status: DISCONTINUED | OUTPATIENT
Start: 2020-07-28 | End: 2020-07-29 | Stop reason: HOSPADM

## 2020-07-28 RX ORDER — FENTANYL CITRATE 50 UG/ML
25-50 INJECTION, SOLUTION INTRAMUSCULAR; INTRAVENOUS
Status: DISCONTINUED | OUTPATIENT
Start: 2020-07-28 | End: 2020-07-29 | Stop reason: HOSPADM

## 2020-07-28 RX ORDER — ONDANSETRON 2 MG/ML
4 INJECTION INTRAMUSCULAR; INTRAVENOUS
Status: DISCONTINUED | OUTPATIENT
Start: 2020-07-28 | End: 2020-07-29 | Stop reason: HOSPADM

## 2020-07-28 RX ORDER — PROPOFOL 10 MG/ML
INJECTION, EMULSION INTRAVENOUS PRN
Status: DISCONTINUED | OUTPATIENT
Start: 2020-07-28 | End: 2020-07-28

## 2020-07-28 RX ORDER — NALOXONE HYDROCHLORIDE 0.4 MG/ML
.1-.4 INJECTION, SOLUTION INTRAMUSCULAR; INTRAVENOUS; SUBCUTANEOUS
Status: DISCONTINUED | OUTPATIENT
Start: 2020-07-28 | End: 2020-07-29 | Stop reason: HOSPADM

## 2020-07-28 RX ORDER — ONDANSETRON 4 MG/1
4 TABLET, ORALLY DISINTEGRATING ORAL EVERY 6 HOURS PRN
Status: DISCONTINUED | OUTPATIENT
Start: 2020-07-28 | End: 2020-07-29 | Stop reason: HOSPADM

## 2020-07-28 RX ORDER — HYDROMORPHONE HYDROCHLORIDE 1 MG/ML
.3-.5 INJECTION, SOLUTION INTRAMUSCULAR; INTRAVENOUS; SUBCUTANEOUS EVERY 10 MIN PRN
Status: DISCONTINUED | OUTPATIENT
Start: 2020-07-28 | End: 2020-07-29 | Stop reason: HOSPADM

## 2020-07-28 RX ORDER — LIDOCAINE HYDROCHLORIDE 20 MG/ML
INJECTION, SOLUTION INFILTRATION; PERINEURAL PRN
Status: DISCONTINUED | OUTPATIENT
Start: 2020-07-28 | End: 2020-07-28

## 2020-07-28 RX ORDER — ONDANSETRON 2 MG/ML
4 INJECTION INTRAMUSCULAR; INTRAVENOUS EVERY 30 MIN PRN
Status: DISCONTINUED | OUTPATIENT
Start: 2020-07-28 | End: 2020-07-29 | Stop reason: HOSPADM

## 2020-07-28 RX ORDER — DEXAMETHASONE SODIUM PHOSPHATE 4 MG/ML
4 INJECTION, SOLUTION INTRA-ARTICULAR; INTRALESIONAL; INTRAMUSCULAR; INTRAVENOUS; SOFT TISSUE EVERY 10 MIN PRN
Status: DISCONTINUED | OUTPATIENT
Start: 2020-07-28 | End: 2020-07-29 | Stop reason: HOSPADM

## 2020-07-28 RX ORDER — KETOROLAC TROMETHAMINE 30 MG/ML
30 INJECTION, SOLUTION INTRAMUSCULAR; INTRAVENOUS EVERY 6 HOURS PRN
Status: DISCONTINUED | OUTPATIENT
Start: 2020-07-28 | End: 2020-07-29 | Stop reason: HOSPADM

## 2020-07-28 RX ADMIN — PROPOFOL 100 MG: 10 INJECTION, EMULSION INTRAVENOUS at 09:05

## 2020-07-28 RX ADMIN — PROPOFOL 50 MG: 10 INJECTION, EMULSION INTRAVENOUS at 09:10

## 2020-07-28 RX ADMIN — SODIUM CHLORIDE, SODIUM LACTATE, POTASSIUM CHLORIDE, CALCIUM CHLORIDE: 600; 310; 30; 20 INJECTION, SOLUTION INTRAVENOUS at 09:00

## 2020-07-28 RX ADMIN — PROPOFOL 200 MCG/KG/MIN: 10 INJECTION, EMULSION INTRAVENOUS at 09:05

## 2020-07-28 RX ADMIN — LIDOCAINE HYDROCHLORIDE 100 MG: 20 INJECTION, SOLUTION INFILTRATION; PERINEURAL at 09:05

## 2020-07-28 NOTE — ANESTHESIA POSTPROCEDURE EVALUATION
Anesthesia POST Procedure Evaluation    Patient: Crescencio Moncada   MRN:     [id number] Gender:   male   Age:    34 year old :      1985        Preoperative Diagnosis: Eosinophilic esophagitis [K20.0]   Procedure(s):  ESOPHAGOGASTRODUODENOSCOPY, WITH CO2 INSUFFLATION  Esophagogastroduodenoscopy, With Biopsy   Postop Comments: No value filed.     Anesthesia Type: MAC       Disposition: Outpatient   Postop Pain Control: Uneventful            Sign Out: Well controlled pain   PONV: No   Neuro/Psych: Uneventful            Sign Out: Acceptable/Baseline neuro status   Airway/Respiratory: Uneventful            Sign Out: Acceptable/Baseline resp. status   CV/Hemodynamics: Uneventful            Sign Out: Acceptable CV status   Other NRE: NONE   DID A NON-ROUTINE EVENT OCCUR? No         Last Anesthesia Record Vitals:  CRNA VITALS  2020 0856 - 2020 0956      2020             Temp:  98.6  F (37  C)          Last PACU Vitals:  Vitals Value Taken Time   /70 2020  9:28 AM   Temp     Pulse     Resp 18 2020  9:28 AM   SpO2 96 % 2020  9:28 AM   Temp src Skin 2020  9:15 AM   NIBP 127/90 2020  9:15 AM   Pulse 79 2020  9:15 AM   SpO2 98 % 2020  9:15 AM   Resp     Temp 98.6  F (37  C) 2020  9:21 AM   Ht Rate     Temp 2           Electronically Signed By: Farzad Lora MD, 2020, 10:04 AM

## 2020-07-28 NOTE — DISCHARGE INSTRUCTIONS
[facility]  Same-Day Surgery   Adult Discharge Orders & Instructions   For 24 hours after surgery  1. Get plenty of rest.  A responsible adult must stay with you for at least 24 hours after you leave the hospital.   2. Do not drive or use heavy equipment.  If you have weakness or tingling, don't drive or use heavy equipment until this feeling goes away.  3. Do not drink alcohol.  4. Avoid strenuous or risky activities.  Ask for help when climbing stairs.   5. You may feel lightheaded.  IF so, sit for a few minutes before standing.  Have someone help you get up.   6. If you have nausea (feel sick to your stomach): Drink only clear liquids such as apple juice, ginger ale, broth or 7-Up.  Rest may also help.  Be sure to drink enough fluids.  Move to a regular diet as you feel able.  7. You may have a slight fever. Call the doctor if your fever is over 100 F (37.7 C) (taken under the tongue) or lasts longer than 24 hours.  8. You may have a dry mouth, a sore throat, muscle aches or trouble sleeping.  These should go away after 24 hours.  9. Do not make important or legal decisions.   Call your doctor for any of the followin.  Signs of infection (fever, growing tenderness at the surgery site, a large amount of drainage or bleeding, severe pain, foul-smelling drainage, redness, swelling).    2. It has been over 8 to 10 hours since surgery and you are still not able to urinate (pass water).    3.  Headache for over 24 hours.    4.  Numbness, tingling or weakness the day after surgery (if you had spinal anesthesia).

## 2020-07-29 ENCOUNTER — TELEPHONE (OUTPATIENT)
Dept: GASTROENTEROLOGY | Facility: CLINIC | Age: 35
End: 2020-07-29

## 2020-07-29 DIAGNOSIS — K20.0 EOSINOPHILIC ESOPHAGITIS: Primary | ICD-10-CM

## 2020-07-29 NOTE — TELEPHONE ENCOUNTER
Orders placed for budesonide.    Will call Thursday to discuss with pt.    Rosa Hunter RN  Gastroenterology Care Coordinator  [place], [place]

## 2020-07-30 LAB — COPATH REPORT: NORMAL

## 2020-12-27 ENCOUNTER — HEALTH MAINTENANCE LETTER (OUTPATIENT)
Age: 35
End: 2020-12-27

## 2021-04-24 ENCOUNTER — HEALTH MAINTENANCE LETTER (OUTPATIENT)
Age: 36
End: 2021-04-24

## 2021-10-09 ENCOUNTER — HEALTH MAINTENANCE LETTER (OUTPATIENT)
Age: 36
End: 2021-10-09

## 2022-05-16 ENCOUNTER — HEALTH MAINTENANCE LETTER (OUTPATIENT)
Age: 37
End: 2022-05-16

## 2022-09-11 ENCOUNTER — HEALTH MAINTENANCE LETTER (OUTPATIENT)
Age: 37
End: 2022-09-11

## 2023-06-03 ENCOUNTER — HEALTH MAINTENANCE LETTER (OUTPATIENT)
Age: 38
End: 2023-06-03

## 2024-06-05 ENCOUNTER — HOSPITAL ENCOUNTER (EMERGENCY)
Facility: CLINIC | Age: 39
Discharge: HOME | End: 2024-06-05
Attending: FAMILY MEDICINE | Admitting: FAMILY MEDICINE

## 2024-06-05 VITALS
BODY MASS INDEX: 31.81 KG/M2 | HEART RATE: 59 BPM | WEIGHT: 240 LBS | TEMPERATURE: 98.9 F | OXYGEN SATURATION: 93 % | RESPIRATION RATE: 18 BRPM | HEIGHT: 73 IN | SYSTOLIC BLOOD PRESSURE: 137 MMHG | DIASTOLIC BLOOD PRESSURE: 85 MMHG

## 2024-06-05 DIAGNOSIS — M79.651 PAIN OF RIGHT THIGH: ICD-10-CM

## 2024-06-05 DIAGNOSIS — S76.311A HAMSTRING MUSCLE STRAIN, RIGHT, INITIAL ENCOUNTER: ICD-10-CM

## 2024-06-05 PROCEDURE — 99283 EMERGENCY DEPT VISIT LOW MDM: CPT | Performed by: FAMILY MEDICINE

## 2024-06-05 RX ORDER — METHYLPREDNISOLONE 4 MG
TABLET, DOSE PACK ORAL
Qty: 21 TABLET | Refills: 0 | Status: SHIPPED | OUTPATIENT
Start: 2024-06-05 | End: 2024-06-26

## 2024-06-05 ASSESSMENT — COLUMBIA-SUICIDE SEVERITY RATING SCALE - C-SSRS
6. HAVE YOU EVER DONE ANYTHING, STARTED TO DO ANYTHING, OR PREPARED TO DO ANYTHING TO END YOUR LIFE?: NO
2. HAVE YOU ACTUALLY HAD ANY THOUGHTS OF KILLING YOURSELF IN THE PAST MONTH?: NO
1. IN THE PAST MONTH, HAVE YOU WISHED YOU WERE DEAD OR WISHED YOU COULD GO TO SLEEP AND NOT WAKE UP?: NO

## 2024-06-05 ASSESSMENT — ACTIVITIES OF DAILY LIVING (ADL)
ADLS_ACUITY_SCORE: 37
ADLS_ACUITY_SCORE: 37

## 2024-06-05 NOTE — DISCHARGE INSTRUCTIONS
I suspect that you have a deep hamstring muscle injury causing your right thigh pain.  You are having signs of a pinched nerve which we will treat with a Medrol Dosepak.  Please see the attached handout.  Follow-up with the sports medicine specialist within the next week.  Use a 6 inch Ace wrap for support and comfort.  Continue with ibuprofen up to 600 mg 4 times a day.  Add Tylenol 1000 mg every 6 hours as needed for breakthrough pain.

## 2024-06-05 NOTE — ED PROVIDER NOTES
ED Provider Note     Crescencio Moncada  [id number]  June 5, 2024      CC:     Chief Complaint   Patient presents with    Hip Pain          History is obtained from the patient.    HPI: Crescencio Moncada is a 38 year old male presenting with 8-week history of right posterior thigh and buttock pain that started after he was up on the rough with the piece of shingle.  He was brought to hammer this down when it slipped.  He reached over to the left side to pick it up, and felt a sudden pain and pulling behind the right thigh.  Patient has had aggravation of the pain for about 3 weeks.  He was not able to work and tried to lay off.  He then had another episode of severe pain when he was getting up from a chair to standing and felt sharp shooting pain radiating from the back of the thigh down towards the knee.  This has gone on for a few more weeks, and now the pain seems to be back to the original posterior thigh and buttock area.  He is having severe pain when he goes from sitting to standing.  It does feel somewhat better once he starts walking.  He has not had any radiation of the pain past the mid calf region.  He has no significant low back pain.  There is some pain emanating from the right buttock region.  He does not feel that the hip joint is involved.  Patient has been to the chiropractor.  He has been using the tennis ball to massage the area.  He has been taking ibuprofen.      PMH/Problem List:   Past Medical History:   Diagnosis Date    Tobacco use 6/20/2019       PSH:   Past Surgical History:   Procedure Laterality Date    COLONOSCOPY WITH CO2 INSUFFLATION N/A 9/11/2017    Procedure: COLONOSCOPY WITH CO2 INSUFFLATION;  EGD, Colonoscopy, Dr Correia referring, BMI 28.11, [facility] fax; [phone];  Surgeon: Amber Lowry MD;  Location: [facility]    COMBINED ESOPHAGOSCOPY, GASTROSCOPY, DUODENOSCOPY (EGD) WITH CO2 INSUFFLATION N/A 9/11/2017     Procedure: COMBINED ESOPHAGOSCOPY, GASTROSCOPY, DUODENOSCOPY (EGD) WITH CO2 INSUFFLATION;  EGD, Colonoscopy, Dr Correia referring, BMI 28.11, [facility] fax; [phone];  Surgeon: Amber Lowry MD;  Location: [facility] OR    COMBINED ESOPHAGOSCOPY, GASTROSCOPY, DUODENOSCOPY (EGD) WITH CO2 INSUFFLATION N/A 7/28/2020    Procedure: ESOPHAGOGASTRODUODENOSCOPY, WITH CO2 INSUFFLATION;  Surgeon: Tiffany Ayala DO;  Location: [facility]    ESOPHAGOSCOPY, GASTROSCOPY, DUODENOSCOPY (EGD), COMBINED N/A 9/11/2017    Procedure: COMBINED ESOPHAGOSCOPY, GASTROSCOPY, DUODENOSCOPY (EGD), BIOPSY SINGLE OR MULTIPLE;;  Surgeon: Amber Lowry MD;  Location: [facility]    ESOPHAGOSCOPY, GASTROSCOPY, DUODENOSCOPY (EGD), COMBINED N/A 11/27/2018    Procedure: COMBINED ESOPHAGOSCOPY, GASTROSCOPY, DUODENOSCOPY (EGD), BIOPSY SINGLE OR MULTIPLE;  Surgeon: Reji Andujar MD;  Location: [facility]    ESOPHAGOSCOPY, GASTROSCOPY, DUODENOSCOPY (EGD), COMBINED N/A 7/28/2020    Procedure: Esophagogastroduodenoscopy, With Biopsy;  Surgeon: Tiffany Ayala DO;  Location: [facility]       MEDS:   No current facility-administered medications for this encounter.     Current Outpatient Medications   Medication Sig Dispense Refill    methylPREDNISolone (MEDROL DOSEPAK) 4 MG tablet therapy pack Follow Package Directions 21 tablet 0    budesonide (ENTOCORT) 0.6 mg/mL SUSP Take 3.33 mLs (2 mg) by mouth daily 99.9 mL 1    COMPOUNDED NON-CONTROLLED SUBSTANCE (CMPD RX) - PHARMACY TO MIX COMPOUNDED MEDICATION 2 mg by Oral or Feeding Tube route daily Take 2 mg daily 60 mg 1    triamcinolone (KENALOG) 0.1 % external ointment Apply topically 2 times daily 30 g 0       Allergies: Patient has no known allergies.    Triage and nursing notes were reviewed.    ROS: All other systems were reviewed and are negative    Physical Exam:  Vitals:    06/05/24 0907 06/05/24 0926 06/05/24 1000   BP: (!) 150/89 (!) 138/93 137/85   Pulse: 85 68 59   Resp: 18     Temp: 98.9  F  "(37.2  C)     TempSrc: Oral     SpO2: 99% 96% 93%   Weight: 108.9 kg (240 lb)     Height: 1.854 m (6' 1\")       GENERAL APPEARANCE: Alert, mild to moderate distress due to posterior thigh pain  HEAD: atraumatic  EYES: PER  HENT: Normal external exam  RESP: Normal respiratory effort  BACK: No tenderness in the lumbar region.  Some tenderness in the right buttock area near the sciatic notch.   HIP: No significant pain with internal and external rotation of the hip joint.  EXT:  Patient has tenderness near the insertion of the proximal hamstring musculature.  No significant pain at the knee.  Negative straight leg raise except for some pulling in the hamstring muscles past 90 degrees.  SKIN: no rash; as above  NEURO: mentation and speech normal; no focal deficits        Labs/Imaging Results:  No results found for this or any previous visit (from the past 24 hour(s)).          Impression:  Final diagnoses:   Pain of right thigh   Hamstring muscle strain, right, initial encounter         ED Course & Medical Decision Making (Plan):  Crescencio Moncada is a 38 year old male with 8 weeks of right posterior thigh pain, with symptoms that are suggestive of a hamstring injury with sciatic type pain.  This may be due to hamstring syndrome.  The patient's shooting pain is what is aggravating him the most.  Will begin a short course of Medrol Dosepak.  A 6 inch Ace wrap was applied to the proximal thigh.  Patient will be referred to sports medicine for further evaluation and management.  Patient expressed understanding and agreement with discharge instructions below.    Written after-visit summary and instructions were given at the time of discharge.        Discharge Instructions:  I suspect that you have a deep hamstring muscle injury causing your right thigh pain.  You are having signs of a pinched nerve which we will treat with a Medrol Dosepak.  Please see the attached handout.  Follow-up with the sports medicine specialist " within the next week.  Use a 6 inch Ace wrap for support and comfort.  Continue with ibuprofen up to 600 mg 4 times a day.  Add Tylenol 1000 mg every 6 hours as needed for breakthrough pain.        Disclaimer: This note consists of words and symbols derived from keyboarding and dictation using voice recognition software.  As a result, there may be errors that have gone undetected.  Please consider this when interpreting information found in this note.       Eduardo Rodriguez MD  06/05/24 8836

## 2024-06-05 NOTE — ED TRIAGE NOTES
He bent over to pick something up quick 8 weeks ago and felt an injury to his R hip.  He has been trying to rest it and sees a chiropractor but the  pain is worsening.

## 2024-06-07 NOTE — PROGRESS NOTES
Crescencio Moncada  :  1985  DOS: 2024  MRN: [id number]  PCP: No primary care provider on file.    Sports Medicine Clinic Visit    HPI  Crescencio Moncada is a 38 year old male who is seen as a self referral presenting with right posterior upper leg pain.    - Mechanism of Injury:    - Around 2024: Was standing on a shingle, hammering, when the shingle slipped. He reached to his left to pick it up and felt a sudden pain in the posterior right upper leg.   - Pertinent history and prior evaluations:    - 2024 ED visit:  8 weeks of right upper posterior leg and buttock pain after slipping on a shingle.  Patient had aggravation of the pain for about 3 weeks.  He was not able to work and tried to lay off.  He then had another episode of severe pain when he was getting up from a chair to standing and felt sharp shooting pain radiating from the back of the thigh down towards the knee. Medrol Dosepak, ice, ibuprofen and Tylenol discussed. Suspected muscle strain vs nerve injury (hamsting syndrome compressing the sciatic nerve).  Sports medicine referral placed.     - Pain Character:    - Location:  right posterior proximal leg, radiates down the back of the leg to the foot  - Character:  electrical zing, aching in the posterior thigh  - Duration:  9 weeks  - Course:  worsening  - Endorses:    - swelling under buttock, severe pain, weakness with abduction, numbness after prolonged sitting  - Denies:    - clicking/popping, grinding, mechanical locking symptoms  - Alleviating factors:    - after starting to walk.  - Aggravating factors:    - sit to stand after about 3 minutes of sitting: electrical pain, lumbar flexion  - Other treatments tried:    -  ice, heat, ibuprofen, massage gun,     - Patient Goals:    - get a formal diagnosis, discuss treatment options  - Social History:   -  Working construction      Review of Systems  Musculoskeletal: as above  Remainder of review of systems is negative including  constitutional, CV, pulmonary, GI, Skin and Neurologic except as noted in HPI or medical history.    Past Medical History:   Diagnosis Date    Tobacco use 6/20/2019     Past Surgical History:   Procedure Laterality Date    COLONOSCOPY WITH CO2 INSUFFLATION N/A 9/11/2017    Procedure: COLONOSCOPY WITH CO2 INSUFFLATION;  EGD, Colonoscopy, Dr Correia referring, BMI 28.11, [facility] fax; [phone];  Surgeon: Amber Lowry MD;  Location: [facility]    COMBINED ESOPHAGOSCOPY, GASTROSCOPY, DUODENOSCOPY (EGD) WITH CO2 INSUFFLATION N/A 9/11/2017    Procedure: COMBINED ESOPHAGOSCOPY, GASTROSCOPY, DUODENOSCOPY (EGD) WITH CO2 INSUFFLATION;  EGD, Colonoscopy, Dr Correia referring, BMI 28.11, [facility] fax; [phone];  Surgeon: Amber Lowry MD;  Location: [facility]    COMBINED ESOPHAGOSCOPY, GASTROSCOPY, DUODENOSCOPY (EGD) WITH CO2 INSUFFLATION N/A 7/28/2020    Procedure: ESOPHAGOGASTRODUODENOSCOPY, WITH CO2 INSUFFLATION;  Surgeon: Tiffany Ayala DO;  Location: [facility]    ESOPHAGOSCOPY, GASTROSCOPY, DUODENOSCOPY (EGD), COMBINED N/A 9/11/2017    Procedure: COMBINED ESOPHAGOSCOPY, GASTROSCOPY, DUODENOSCOPY (EGD), BIOPSY SINGLE OR MULTIPLE;;  Surgeon: Amber Lowry MD;  Location: [facility]    ESOPHAGOSCOPY, GASTROSCOPY, DUODENOSCOPY (EGD), COMBINED N/A 11/27/2018    Procedure: COMBINED ESOPHAGOSCOPY, GASTROSCOPY, DUODENOSCOPY (EGD), BIOPSY SINGLE OR MULTIPLE;  Surgeon: Reji Andujar MD;  Location:  GI    ESOPHAGOSCOPY, GASTROSCOPY, DUODENOSCOPY (EGD), COMBINED N/A 7/28/2020    Procedure: Esophagogastroduodenoscopy, With Biopsy;  Surgeon: Tiffany Ayala DO;  Location: [facility] OR     Family History   Problem Relation Age of Onset    Myocardial Infarction Paternal Grandfather 46    Genetic Disorder Mother         spinal muscular atrophy gene    Genetic Disorder Sister 0        spinal muscular atrophy    Coronary Artery Disease Paternal Half-Brother          Objective  /85   Wt 108.9 kg (240 lb)    BMI 31.66 kg/m      General: healthy, alert and in no acute distress.    HEENT: no scleral icterus or conjunctival erythema.   Skin: no suspicious lesions or rash. No jaundice.   CV: regular rhythm by palpation, 2+ distal pulses.  Resp: normal respiratory effort without conversational dyspnea.   Psych: normal mood and affect.    Gait: nonantalgic, appropriate coordination and balance. Prefers to stand rather than sit.     Neuro:        - Sensation to light touch:    - Slightly diminished sensation in the right L5/S1 distribution. Otherwise SILT in all other nerve distributions.        - MSR:      RLE  LLE  - Patella 2+ 2+  - Achilles 2+ 2+       - Special tests:   - Slump/SLR: Positive on the right     MSK - Hip:       - Inspection:    - No significant swelling, erythema, warmth, ecchymosis, lesion.        - ROM:    - Full AROM/PROM with pain in the right hamstring during knee flexion, hip extension.  Back and radicular pain with lumbar flexion.       - Palpation:    - TTP at the right ischial tuberosity, proximal hamstring tendon, proximal third of the hamstring, right-sided lumbar paraspinals.   - NTTP elsewhere.        - Strength:  (*antalgic)  RLE LLE  - Hip Flexion  5 5   - Hip Extension 5-* 5   - Hip Abduction 5 5   - Hip Adduction 5 5  - Knee Flexion  4* 5  - Knee Extension 5 5  - Dorsiflexion  5- 5  - Plantarflexion 5- 5  - Ext. Eddie. Longus 5- 5       - Special tests:   - Log roll:  Neg    - Resisted SLR:  Neg   - FADIR/Scour:  Neg    - PERCY:  Neg    - Prone Hamstring stress:  Pos for weakness and pain on the right    Radiology  I independently reviewed the available relevant imaging in the chart with my interpretations as above in HPI.     I independently reviewed today's new relevant imaging, with the following interpretation:  -XR pelvis 6/11/2024 shows normal anatomic alignment without significant degenerative changes, no avulsion fracture at the ischial tuberosity.      Assessment  1. Lumbosacral  radiculopathy    2. Hamstring muscle strain, right, initial encounter        Plan  Crescencio Moncada is a pleasant 38 year old male that presents with chronic right posterior leg pain after an injury at work approximately 9 weeks ago.  He describes that he was standing on a rope when something slipped and he went to grab it quickly resulting in a high velocity reach down and forward, lumbar flexed forward at the hip.  He had felt/heard a pop in the back of the leg and had pretty severe pain in the posterior thigh proximally/lower buttocks.  Denies major bruising or florid weakness of the hamstring afterward, but it has been painful to walk, sit, and perform knee flexion since that time.  Also continues to be painful at the ischial tuberosity.  A few weeks after the injury, he started feeling right-sided back pain and zinging electrical pain down the posterior right leg all the way to the foot with associated numbness/tingling in the foot and leg.  This seems to be worse every time he is in lumbar flexion or lifting heavy objects.  Has been trying to use proper lifting technique to avoid this, which is somewhat helpful.  Presents today due to lingering pain.      His history and physical exam today appear most consistent with an acute right proximal hamstring strain/partial tear and subsequent right L5/S1 lumbosacral radiculopathy likely from disc herniation.     We discussed the nature of the condition and available treatment options, and mutually agreed upon the following plan:    - Imaging:          - Reviewed and independently interpreted the relevant imaging in the chart, including any imaging ordered for today's clinic.  - Reviewed results and images with patient.   - Medications:          - Discussed pharmacologic options for pain relief.   - May use NSAIDs (Ibuprofen, Naproxen) or Acetaminophen (Tylenol) as needed for pain control.   - Do not take these if previously advised to avoid them for other medical  conditions.  - May also use topical medications such as lidocaine, IcyHot, BioFreeze, or Voltaren gel as needed for pain control.    - Voltaren gel is an anti-inflammatory cream that may be used up to 4 times per day over the painful area.   -Added a Medrol Dosepak for acute radiculopathy symptoms.  - Injections:          - Discussed possible injection options and alternatives.    - Injection options include: Lumbosacral DEVON at L5-S1 likely if symptoms persist.  Ischial bursa injection could be considered if proximal hamstring issues persist.  - Therapy:          - Discussed the benefits of therapy vs home exercise program for optimization of range of motion, flexibility, strength, stability and function.   - Preference is for therapy for both the hamstring and lumbosacral radiculopathy.   -Physical therapy referral placed today and instructed to call [phone] to schedule appointments.   - Modalities:          - May use ice, heat, massage or other modalities as needed.  - Surgery:          - Discussed non-operative and operative treatment options for the patient's condition.  No significant hamstring tear is suspected and no neurologic red flags for lumbosacral radiculopathy to warrant surgical consideration.  Expect him to do well with conservative management.  - Activity:          - Encouraged to remain active and participate in regular activities as symptoms allow.   Avoid or modify exacerbating activities as needed.  - Follow up:          -In 6 to 8 weeks for re-evaluation and update to treatment plan.  - May follow up sooner for new/worsening symptoms.  - May contact clinic by phone or MyChart for questions or concerns.       Milton Wallace DO, CAARCADIOM  [facility] - Sports Medicine  [facility] - Department of Orthopedic Surgery       Disclaimer:  This note was prepared and written using Dragon Medical dictation software. As a result, there may be errors in the script that have  gone undetected. Please consider this when interpreting the information in this note.

## 2024-06-11 ENCOUNTER — OFFICE VISIT (OUTPATIENT)
Dept: ORTHOPEDICS | Facility: OTHER | Age: 39
End: 2024-06-11

## 2024-06-11 ENCOUNTER — ANCILLARY PROCEDURE (OUTPATIENT)
Dept: GENERAL RADIOLOGY | Facility: OTHER | Age: 39
End: 2024-06-11
Attending: STUDENT IN AN ORGANIZED HEALTH CARE EDUCATION/TRAINING PROGRAM

## 2024-06-11 VITALS — BODY MASS INDEX: 31.66 KG/M2 | SYSTOLIC BLOOD PRESSURE: 137 MMHG | WEIGHT: 240 LBS | DIASTOLIC BLOOD PRESSURE: 85 MMHG

## 2024-06-11 DIAGNOSIS — S76.311A HAMSTRING MUSCLE STRAIN, RIGHT, INITIAL ENCOUNTER: ICD-10-CM

## 2024-06-11 DIAGNOSIS — M54.17 LUMBOSACRAL RADICULOPATHY: Primary | ICD-10-CM

## 2024-06-11 DIAGNOSIS — M79.651 PAIN OF RIGHT THIGH: ICD-10-CM

## 2024-06-11 PROCEDURE — 72170 X-RAY EXAM OF PELVIS: CPT | Mod: TC | Performed by: RADIOLOGY

## 2024-06-11 PROCEDURE — 99204 OFFICE O/P NEW MOD 45 MIN: CPT | Performed by: STUDENT IN AN ORGANIZED HEALTH CARE EDUCATION/TRAINING PROGRAM

## 2024-06-11 RX ORDER — METHYLPREDNISOLONE 4 MG
TABLET, DOSE PACK ORAL
Qty: 21 TABLET | Refills: 0 | Status: SHIPPED | OUTPATIENT
Start: 2024-06-11 | End: 2024-06-26

## 2024-06-11 ASSESSMENT — PAIN SCALES - GENERAL: PAINLEVEL: NO PAIN (1)

## 2024-06-11 NOTE — LETTER
2024      Crescencio Moncada  [id number] [facility] [address]      Dear Colleague,    Thank you for referring your patient, Crescencio Moncada, to the [facility] [place]. Please see a copy of my visit note below.    Crescencio Moncada  :  1985  DOS: 2024  MRN: [id number]  PCP: No primary care provider on file.    Sports Medicine Clinic Visit    HPI  Crescencio Moncada is a 38 year old male who is seen as a self referral presenting with right posterior upper leg pain.    - Mechanism of Injury:    - Around 2024: Was standing on a shingle, hammering, when the shingle slipped. He reached to his left to pick it up and felt a sudden pain in the posterior right upper leg.   - Pertinent history and prior evaluations:    - 2024 ED visit:  8 weeks of right upper posterior leg and buttock pain after slipping on a shingle.  Patient had aggravation of the pain for about 3 weeks.  He was not able to work and tried to lay off.  He then had another episode of severe pain when he was getting up from a chair to standing and felt sharp shooting pain radiating from the back of the thigh down towards the knee. Medrol Dosepak, ice, ibuprofen and Tylenol discussed. Suspected muscle strain vs nerve injury (hamsting syndrome compressing the sciatic nerve).  Sports medicine referral placed.     - Pain Character:    - Location:  right posterior proximal leg, radiates down the back of the leg to the foot  - Character:  electrical zing, aching in the posterior thigh  - Duration:  9 weeks  - Course:  worsening  - Endorses:    - swelling under buttock, severe pain, weakness with abduction, numbness after prolonged sitting  - Denies:    - clicking/popping, grinding, mechanical locking symptoms  - Alleviating factors:    - after starting to walk.  - Aggravating factors:    - sit to stand after about 3 minutes of sitting: electrical pain, lumbar flexion  - Other treatments tried:    -  ice, heat,  ibuprofen, massage gun,     - Patient Goals:    - get a formal diagnosis, discuss treatment options  - Social History:   -  Working construction      Review of Systems  Musculoskeletal: as above  Remainder of review of systems is negative including constitutional, CV, pulmonary, GI, Skin and Neurologic except as noted in HPI or medical history.    Past Medical History:   Diagnosis Date     Tobacco use 6/20/2019     Past Surgical History:   Procedure Laterality Date     COLONOSCOPY WITH CO2 INSUFFLATION N/A 9/11/2017    Procedure: COLONOSCOPY WITH CO2 INSUFFLATION;  EGD, Colonoscopy, Dr Correia referring, BMI 28.11, [facility] fax; [phone];  Surgeon: Amber Lowry MD;  Location: [facility]     COMBINED ESOPHAGOSCOPY, GASTROSCOPY, DUODENOSCOPY (EGD) WITH CO2 INSUFFLATION N/A 9/11/2017    Procedure: COMBINED ESOPHAGOSCOPY, GASTROSCOPY, DUODENOSCOPY (EGD) WITH CO2 INSUFFLATION;  EGD, Colonoscopy, Dr Correia referring, BMI 28.11, [facility] fax; [phone];  Surgeon: Amber Lowry MD;  Location: [facility]     COMBINED ESOPHAGOSCOPY, GASTROSCOPY, DUODENOSCOPY (EGD) WITH CO2 INSUFFLATION N/A 7/28/2020    Procedure: ESOPHAGOGASTRODUODENOSCOPY, WITH CO2 INSUFFLATION;  Surgeon: Tiffany Ayala DO;  Location: [facility]     ESOPHAGOSCOPY, GASTROSCOPY, DUODENOSCOPY (EGD), COMBINED N/A 9/11/2017    Procedure: COMBINED ESOPHAGOSCOPY, GASTROSCOPY, DUODENOSCOPY (EGD), BIOPSY SINGLE OR MULTIPLE;;  Surgeon: Amber Lowry MD;  Location: [facility]     ESOPHAGOSCOPY, GASTROSCOPY, DUODENOSCOPY (EGD), COMBINED N/A 11/27/2018    Procedure: COMBINED ESOPHAGOSCOPY, GASTROSCOPY, DUODENOSCOPY (EGD), BIOPSY SINGLE OR MULTIPLE;  Surgeon: Reji Andujar MD;  Location: [facility]     ESOPHAGOSCOPY, GASTROSCOPY, DUODENOSCOPY (EGD), COMBINED N/A 7/28/2020    Procedure: Esophagogastroduodenoscopy, With Biopsy;  Surgeon: Tiffany Ayala DO;  Location: [facility]     Family History   Problem Relation Age of Onset     Myocardial  Infarction Paternal Grandfather 46     Genetic Disorder Mother         spinal muscular atrophy gene     Genetic Disorder Sister 0        spinal muscular atrophy     Coronary Artery Disease Paternal Half-Brother          Objective  /85   Wt 108.9 kg (240 lb)   BMI 31.66 kg/m      General: healthy, alert and in no acute distress.    HEENT: no scleral icterus or conjunctival erythema.   Skin: no suspicious lesions or rash. No jaundice.   CV: regular rhythm by palpation, 2+ distal pulses.  Resp: normal respiratory effort without conversational dyspnea.   Psych: normal mood and affect.    Gait: nonantalgic, appropriate coordination and balance. Prefers to stand rather than sit.     Neuro:        - Sensation to light touch:    - Slightly diminished sensation in the right L5/S1 distribution. Otherwise SILT in all other nerve distributions.        - MSR:      RLE  LLE  - Patella 2+ 2+  - Achilles 2+ 2+       - Special tests:   - Slump/SLR: Positive on the right     MSK - Hip:       - Inspection:    - No significant swelling, erythema, warmth, ecchymosis, lesion.        - ROM:    - Full AROM/PROM with pain in the right hamstring during knee flexion, hip extension.  Back and radicular pain with lumbar flexion.       - Palpation:    - TTP at the right ischial tuberosity, proximal hamstring tendon, proximal third of the hamstring, right-sided lumbar paraspinals.   - NTTP elsewhere.        - Strength:  (*antalgic)  RLE LLE  - Hip Flexion  5 5   - Hip Extension 5-* 5   - Hip Abduction 5 5   - Hip Adduction 5 5  - Knee Flexion  4* 5  - Knee Extension 5 5  - Dorsiflexion  5- 5  - Plantarflexion 5- 5  - Ext. Eddie. Longus 5- 5       - Special tests:   - Log roll:  Neg    - Resisted SLR:  Neg   - FADIR/Scour:  Neg    - PERCY:  Neg    - Prone Hamstring stress:  Pos for weakness and pain on the right    Radiology  I independently reviewed the available relevant imaging in the chart with my interpretations as above in HPI.     I  independently reviewed today's new relevant imaging, with the following interpretation:  -XR pelvis 6/11/2024 shows normal anatomic alignment without significant degenerative changes, no avulsion fracture at the ischial tuberosity.      Assessment  1. Lumbosacral radiculopathy    2. Hamstring muscle strain, right, initial encounter        Plan  Crescencio Moncada is a pleasant 38 year old male that presents with chronic right posterior leg pain after an injury at work approximately 9 weeks ago.  He describes that he was standing on a rope when something slipped and he went to grab it quickly resulting in a high velocity reach down and forward, lumbar flexed forward at the hip.  He had felt/heard a pop in the back of the leg and had pretty severe pain in the posterior thigh proximally/lower buttocks.  Denies major bruising or florid weakness of the hamstring afterward, but it has been painful to walk, sit, and perform knee flexion since that time.  Also continues to be painful at the ischial tuberosity.  A few weeks after the injury, he started feeling right-sided back pain and zinging electrical pain down the posterior right leg all the way to the foot with associated numbness/tingling in the foot and leg.  This seems to be worse every time he is in lumbar flexion or lifting heavy objects.  Has been trying to use proper lifting technique to avoid this, which is somewhat helpful.  Presents today due to lingering pain.      His history and physical exam today appear most consistent with an acute right proximal hamstring strain/partial tear and subsequent right L5/S1 lumbosacral radiculopathy likely from disc herniation.     We discussed the nature of the condition and available treatment options, and mutually agreed upon the following plan:    - Imaging:          - Reviewed and independently interpreted the relevant imaging in the chart, including any imaging ordered for today's clinic.  - Reviewed results and images  with patient.   - Medications:          - Discussed pharmacologic options for pain relief.   - May use NSAIDs (Ibuprofen, Naproxen) or Acetaminophen (Tylenol) as needed for pain control.   - Do not take these if previously advised to avoid them for other medical conditions.  - May also use topical medications such as lidocaine, IcyHot, BioFreeze, or Voltaren gel as needed for pain control.    - Voltaren gel is an anti-inflammatory cream that may be used up to 4 times per day over the painful area.   -Added a Medrol Dosepak for acute radiculopathy symptoms.  - Injections:          - Discussed possible injection options and alternatives.    - Injection options include: Lumbosacral DEVON at L5-S1 likely if symptoms persist.  Ischial bursa injection could be considered if proximal hamstring issues persist.  - Therapy:          - Discussed the benefits of therapy vs home exercise program for optimization of range of motion, flexibility, strength, stability and function.   - Preference is for therapy for both the hamstring and lumbosacral radiculopathy.   -Physical therapy referral placed today and instructed to call [phone] to schedule appointments.   - Modalities:          - May use ice, heat, massage or other modalities as needed.  - Surgery:          - Discussed non-operative and operative treatment options for the patient's condition.  No significant hamstring tear is suspected and no neurologic red flags for lumbosacral radiculopathy to warrant surgical consideration.  Expect him to do well with conservative management.  - Activity:          - Encouraged to remain active and participate in regular activities as symptoms allow.   Avoid or modify exacerbating activities as needed.  - Follow up:          -In 6 to 8 weeks for re-evaluation and update to treatment plan.  - May follow up sooner for new/worsening symptoms.  - May contact clinic by phone or MyChart for questions or concerns.       Milton Wallace, DO,  CAQSM  [facility] - Department of Orthopedic Surgery       Disclaimer:  This note was prepared and written using Dragon Medical dictation software. As a result, there may be errors in the script that have gone undetected. Please consider this when interpreting the information in this note.       Again, thank you for allowing me to participate in the care of your patient.        Sincerely,        Milton Wallace, DO

## 2024-06-13 ENCOUNTER — VIRTUAL VISIT (OUTPATIENT)
Dept: ORTHOPEDICS | Facility: CLINIC | Age: 39
End: 2024-06-13

## 2024-06-13 ENCOUNTER — HOSPITAL ENCOUNTER (OUTPATIENT)
Dept: MRI IMAGING | Facility: CLINIC | Age: 39
Discharge: HOME | End: 2024-06-13
Attending: STUDENT IN AN ORGANIZED HEALTH CARE EDUCATION/TRAINING PROGRAM | Admitting: STUDENT IN AN ORGANIZED HEALTH CARE EDUCATION/TRAINING PROGRAM

## 2024-06-13 DIAGNOSIS — M54.17 LUMBOSACRAL RADICULOPATHY: ICD-10-CM

## 2024-06-13 DIAGNOSIS — M51.27 LUMBOSACRAL DISC HERNIATION: ICD-10-CM

## 2024-06-13 DIAGNOSIS — M54.17 LUMBOSACRAL RADICULOPATHY: Primary | ICD-10-CM

## 2024-06-13 PROCEDURE — 99443 PR PHYSICIAN TELEPHONE EVALUATION 21-30 MIN: CPT | Mod: 93 | Performed by: STUDENT IN AN ORGANIZED HEALTH CARE EDUCATION/TRAINING PROGRAM

## 2024-06-13 PROCEDURE — 72148 MRI LUMBAR SPINE W/O DYE: CPT

## 2024-06-13 NOTE — PROGRESS NOTES
Johan is a 38 year old who is being evaluated via a billable telephone visit.    What phone number would you like to be contacted at? [phone]   How would you like to obtain your AVS? Liamhart  Originating Location (pt. Location): Home    Distant Location (provider location):  On-site  Phone call duration: 22 minutes     Crescencio Moncada  :  1985  DOS: 2024  MRN: [id number]  PCP: No primary care provider on file.    Sports Medicine Clinic Visit    HPI  Crescencio Moncada is a 38 year old male who is seen as a self referral presenting with right posterior upper leg pain.    - Mechanism of Injury:    - Around 2024: Was standing on a shingle, hammering, when the shingle slipped. He reached to his left to pick it up and felt a sudden pain in the posterior right upper leg.   - Pertinent history and prior evaluations:    - 2024 ED visit:  8 weeks of right upper posterior leg and buttock pain after slipping on a shingle.  Patient had aggravation of the pain for about 3 weeks.  He was not able to work and tried to lay off.  He then had another episode of severe pain when he was getting up from a chair to standing and felt sharp shooting pain radiating from the back of the thigh down towards the knee. Medrol Dosepak, ice, ibuprofen and Tylenol discussed. Suspected muscle strain vs nerve injury (hamsting syndrome compressing the sciatic nerve).  Sports medicine referral placed.     - Pain Character:    - Location:  right posterior proximal leg, radiates down the back of the leg to the foot  - Character:  electrical zing, aching in the posterior thigh  - Duration:  9 weeks  - Course:  worsening  - Endorses:    - swelling under buttock, severe pain, weakness with abduction, numbness after prolonged sitting  - Denies:    - clicking/popping, grinding, mechanical locking symptoms  - Alleviating factors:    - after starting to walk.  - Aggravating factors:    - sit to stand after about 3 minutes of sitting:  electrical pain, lumbar flexion  - Other treatments tried:    -  ice, heat, ibuprofen, massage gun,     - Patient Goals:    - get a formal diagnosis, discuss treatment options  - Social History:   -  Working construction      Review of Systems  Musculoskeletal: as above  Remainder of review of systems is negative including constitutional, CV, pulmonary, GI, Skin and Neurologic except as noted in HPI or medical history.    Past Medical History:   Diagnosis Date    Tobacco use 6/20/2019     Past Surgical History:   Procedure Laterality Date    COLONOSCOPY WITH CO2 INSUFFLATION N/A 9/11/2017    Procedure: COLONOSCOPY WITH CO2 INSUFFLATION;  EGD, Colonoscopy, Dr Correia referring, BMI 28.11, [facility] fax; [phone];  Surgeon: Abmer Lowry MD;  Location: [facility]    COMBINED ESOPHAGOSCOPY, GASTROSCOPY, DUODENOSCOPY (EGD) WITH CO2 INSUFFLATION N/A 9/11/2017    Procedure: COMBINED ESOPHAGOSCOPY, GASTROSCOPY, DUODENOSCOPY (EGD) WITH CO2 INSUFFLATION;  EGD, Colonoscopy, Dr Correia referring, BMI 28.11, [facility] fax; [phone];  Surgeon: Amber Lowry MD;  Location: [facility]    COMBINED ESOPHAGOSCOPY, GASTROSCOPY, DUODENOSCOPY (EGD) WITH CO2 INSUFFLATION N/A 7/28/2020    Procedure: ESOPHAGOGASTRODUODENOSCOPY, WITH CO2 INSUFFLATION;  Surgeon: Tiffany Ayala DO;  Location: [facility]    ESOPHAGOSCOPY, GASTROSCOPY, DUODENOSCOPY (EGD), COMBINED N/A 9/11/2017    Procedure: COMBINED ESOPHAGOSCOPY, GASTROSCOPY, DUODENOSCOPY (EGD), BIOPSY SINGLE OR MULTIPLE;;  Surgeon: Amber Lowry MD;  Location: [facility]    ESOPHAGOSCOPY, GASTROSCOPY, DUODENOSCOPY (EGD), COMBINED N/A 11/27/2018    Procedure: COMBINED ESOPHAGOSCOPY, GASTROSCOPY, DUODENOSCOPY (EGD), BIOPSY SINGLE OR MULTIPLE;  Surgeon: Reji Andujar MD;  Location:  [facility]    ESOPHAGOSCOPY, GASTROSCOPY, DUODENOSCOPY (EGD), COMBINED N/A 7/28/2020    Procedure: Esophagogastroduodenoscopy, With Biopsy;  Surgeon: Tiffany Ayala DO;  Location:  OR      Family History   Problem Relation Age of Onset    Myocardial Infarction Paternal Grandfather 46    Genetic Disorder Mother         spinal muscular atrophy gene    Genetic Disorder Sister 0        spinal muscular atrophy    Coronary Artery Disease Paternal Half-Brother          Objective  No physical exam due to the nature of the telephone visit.    Radiology  I independently reviewed the available relevant imaging in the chart with my interpretations as above in HPI.   - XR pelvis 6/11/2024 shows normal anatomic alignment without significant degenerative changes, no avulsion fracture at the ischial tuberosity.    I independently reviewed today's new relevant imaging, with the following interpretation:  - MRI lumbar spine 6/13/2024 shows a right central disc protrusion at L5-S1 that contributes to mass effect upon the descending right S1 nerve root.  Also shows a small disc bulge at L4-5 with mild canal stenosis and facet disease.  Mild right foraminal narrowing at this level as well.      Assessment  1. Lumbosacral radiculopathy    2. Lumbosacral disc herniation        Plan  Crescencio Moncada is a pleasant 38 year old male that presents with chronic right posterior leg pain after an injury at work approximately 9 weeks ago.  He describes that he was standing on a rope when something slipped and he went to grab it quickly resulting in a high velocity reach down and forward, lumbar flexed forward at the hip.  He had felt/heard a pop in the back of the leg and had pretty severe pain in the posterior thigh proximally/lower buttocks.  Denies major bruising or florid weakness of the hamstring afterward, but it has been painful to walk, sit, and perform knee flexion since that time.  Also continues to be painful at the ischial tuberosity.  A few weeks after the injury, he started feeling right-sided back pain and zinging electrical pain down the posterior right leg all the way to the foot with associated  numbness/tingling in the foot and leg.  This seems to be worse every time he is in lumbar flexion or lifting heavy objects.  Has been trying to use proper lifting technique to avoid this, which is somewhat helpful.  Presents today due to lingering pain.      His history and physical exam today appear most consistent with an acute right proximal hamstring strain/partial tear and subsequent right L5/S1 lumbosacral radiculopathy likely from disc herniation.     We discussed the nature of the condition and available treatment options, and mutually agreed upon the following plan:    - Imaging:          - Reviewed and independently interpreted the relevant imaging in the chart, including any imaging ordered for today's clinic.  - Reviewed results and images with patient.   - Medications:          - Discussed pharmacologic options for pain relief.   - May use NSAIDs (Ibuprofen, Naproxen) or Acetaminophen (Tylenol) as needed for pain control.   - Do not take these if previously advised to avoid them for other medical conditions.  - May also use topical medications such as lidocaine, IcyHot, BioFreeze, or Voltaren gel as needed for pain control.    - Voltaren gel is an anti-inflammatory cream that may be used up to 4 times per day over the painful area.   -Added a Medrol Dosepak for acute radiculopathy symptoms.  - Injections:          - Discussed possible injection options and alternatives.    - Injection options include: Lumbosacral DEVON at L5-S1 likely if symptoms persist.  Ischial bursa injection could be considered if proximal hamstring issues persist.  - Therapy:          - Discussed the benefits of therapy vs home exercise program for optimization of range of motion, flexibility, strength, stability and function.   - Preference is for therapy for both the hamstring and lumbosacral radiculopathy.   -Physical therapy referral placed today and instructed to call [phone] to schedule appointments.   - Modalities:        "   - May use ice, heat, massage or other modalities as needed.  - Surgery:          - Discussed non-operative and operative treatment options for the patient's condition.  No significant hamstring tear is suspected and no neurologic red flags for lumbosacral radiculopathy to warrant surgical consideration.  Expect him to do well with conservative management.  - Activity:          - Encouraged to remain active and participate in regular activities as symptoms allow.   Avoid or modify exacerbating activities as needed.  - Follow up:          -In 6 to 8 weeks for re-evaluation and update to treatment plan.  - May follow up sooner for new/worsening symptoms.  - May contact clinic by phone or MyChart for questions or concerns.       Updated Plan - 6/13/24:  Johan messaged in and called about sever radiating pain down the posterior leg and rapid onset weakness of the leg and stating that he could not walk anymore.  He was also noticing an acute onset rash covering his \"whole body\" with numbness/tingling worsening in BLE.  I attempted to get in contact with him over the phone, but there was no answer.  I gave him options for workup that included urgent care, ED, or a stat MRI of the lumbar spine.  He did not feel that the symptoms warranted an emergency department visit and he ended up getting the MRI.  Presents today by telephone to discuss the results of the stat MRI and next steps.    After discussion, it sounds like he does have severe pain shooting down the back of the leg to the bottom of the foot that is electrical in nature, numbness/tingling in a similar distribution, consistent with S1 radiculopathy.  This correlates well with new MRI findings of an acute disc herniation compressing on the right S1 nerve root.  He denies rapidly progressive weakness of the right leg or bilateral legs, bowel/bladder changes, saddle anesthesia, balance changes.  His rash seems to be getting better after starting the Medrol " Dosepak.    We discussed our treatment options.  I do not believe his lumbar pathology or symptoms warrant urgent surgical consideration, but he could benefit from an epidural steroid injection.  I placed a referral today for a right-sided L5-S1 DEVON.  In the meantime, recommended to continue the Medrol Dosepak, Robaxin, and increase the frequency of his lumbar extension based exercises which he will do and he currently does endorse that they improve symptoms somewhat.       Milton Wallace DO, CAQSM  [facility] - Department of Orthopedic Surgery       Disclaimer:  This note was prepared and written using Dragon Medical dictation software. As a result, there may be errors in the script that have gone undetected. Please consider this when interpreting the information in this note.

## 2024-06-13 NOTE — LETTER
2024      Crescencio Moncada  [address] [address]  [facility] [address]      Dear Colleague,    Thank you for referring your patient, Crescencio Moncada, to the [facility] [place]. Please see a copy of my visit note below.    Johan is a 38 year old who is being evaluated via a billable telephone visit.    What phone number would you like to be contacted at? [phone]   How would you like to obtain your AVS? MyChart  Originating Location (pt. Location): Home    Distant Location (provider location):  On-site  Phone call duration: 22 minutes     Crescencio Moncada  :  1985  DOS: 2024  MRN: [id number]  PCP: No primary care provider on file.    Sports Medicine Clinic Visit    HPI  Crescencio Moncada is a 38 year old male who is seen as a self referral presenting with right posterior upper leg pain.    - Mechanism of Injury:    - Around 2024: Was standing on a shingle, hammering, when the shingle slipped. He reached to his left to pick it up and felt a sudden pain in the posterior right upper leg.   - Pertinent history and prior evaluations:    - 2024 ED visit:  8 weeks of right upper posterior leg and buttock pain after slipping on a shingle.  Patient had aggravation of the pain for about 3 weeks.  He was not able to work and tried to lay off.  He then had another episode of severe pain when he was getting up from a chair to standing and felt sharp shooting pain radiating from the back of the thigh down towards the knee. Medrol Dosepak, ice, ibuprofen and Tylenol discussed. Suspected muscle strain vs nerve injury (hamsting syndrome compressing the sciatic nerve).  Sports medicine referral placed.     - Pain Character:    - Location:  right posterior proximal leg, radiates down the back of the leg to the foot  - Character:  electrical zing, aching in the posterior thigh  - Duration:  9 weeks  - Course:  worsening  - Endorses:    - swelling under buttock, severe pain, weakness with  abduction, numbness after prolonged sitting  - Denies:    - clicking/popping, grinding, mechanical locking symptoms  - Alleviating factors:    - after starting to walk.  - Aggravating factors:    - sit to stand after about 3 minutes of sitting: electrical pain, lumbar flexion  - Other treatments tried:    -  ice, heat, ibuprofen, massage gun,     - Patient Goals:    - get a formal diagnosis, discuss treatment options  - Social History:   -  Working construction      Review of Systems  Musculoskeletal: as above  Remainder of review of systems is negative including constitutional, CV, pulmonary, GI, Skin and Neurologic except as noted in HPI or medical history.    Past Medical History:   Diagnosis Date     Tobacco use 6/20/2019     Past Surgical History:   Procedure Laterality Date     COLONOSCOPY WITH CO2 INSUFFLATION N/A 9/11/2017    Procedure: COLONOSCOPY WITH CO2 INSUFFLATION;  EGD, Colonoscopy, Dr Correia referring, BMI 28.11, [facility] [facility]  fax; [phone];  Surgeon: Amber Lowry MD;  Location: [facility]     COMBINED ESOPHAGOSCOPY, GASTROSCOPY, DUODENOSCOPY (EGD) WITH CO2 INSUFFLATION N/A 9/11/2017    Procedure: COMBINED ESOPHAGOSCOPY, GASTROSCOPY, DUODENOSCOPY (EGD) WITH CO2 INSUFFLATION;  EGD, Colonoscopy, Dr Correia referring, BMI 28.11, [facility] fax; [phone];  Surgeon: Amber Lowry MD;  Location: [facility]     COMBINED ESOPHAGOSCOPY, GASTROSCOPY, DUODENOSCOPY (EGD) WITH CO2 INSUFFLATION N/A 7/28/2020    Procedure: ESOPHAGOGASTRODUODENOSCOPY, WITH CO2 INSUFFLATION;  Surgeon: Tiffany Ayala DO;  Location: [facility]     ESOPHAGOSCOPY, GASTROSCOPY, DUODENOSCOPY (EGD), COMBINED N/A 9/11/2017    Procedure: COMBINED ESOPHAGOSCOPY, GASTROSCOPY, DUODENOSCOPY (EGD), BIOPSY SINGLE OR MULTIPLE;;  Surgeon: Amber Lowry MD;  Location: [facility]     ESOPHAGOSCOPY, GASTROSCOPY, DUODENOSCOPY (EGD), COMBINED N/A 11/27/2018    Procedure: COMBINED ESOPHAGOSCOPY, GASTROSCOPY, DUODENOSCOPY (EGD), BIOPSY  SINGLE OR MULTIPLE;  Surgeon: Reji Andujar MD;  Location:  GI     ESOPHAGOSCOPY, GASTROSCOPY, DUODENOSCOPY (EGD), COMBINED N/A 7/28/2020    Procedure: Esophagogastroduodenoscopy, With Biopsy;  Surgeon: Tiffany Ayala DO;  Location: [facility] OR     Family History   Problem Relation Age of Onset     Myocardial Infarction Paternal Grandfather 46     Genetic Disorder Mother         spinal muscular atrophy gene     Genetic Disorder Sister 0        spinal muscular atrophy     Coronary Artery Disease Paternal Half-Brother          Objective  No physical exam due to the nature of the telephone visit.    Radiology  I independently reviewed the available relevant imaging in the chart with my interpretations as above in HPI.   - XR pelvis 6/11/2024 shows normal anatomic alignment without significant degenerative changes, no avulsion fracture at the ischial tuberosity.    I independently reviewed today's new relevant imaging, with the following interpretation:  - MRI lumbar spine 6/13/2024 shows a right central disc protrusion at L5-S1 that contributes to mass effect upon the descending right S1 nerve root.  Also shows a small disc bulge at L4-5 with mild canal stenosis and facet disease.  Mild right foraminal narrowing at this level as well.      Assessment  1. Lumbosacral radiculopathy    2. Lumbosacral disc herniation        Plan  Crescencio Moncada is a pleasant 38 year old male that presents with chronic right posterior leg pain after an injury at work approximately 9 weeks ago.  He describes that he was standing on a rope when something slipped and he went to grab it quickly resulting in a high velocity reach down and forward, lumbar flexed forward at the hip.  He had felt/heard a pop in the back of the leg and had pretty severe pain in the posterior thigh proximally/lower buttocks.  Denies major bruising or florid weakness of the hamstring afterward, but it has been painful to walk, sit, and perform knee flexion  since that time.  Also continues to be painful at the ischial tuberosity.  A few weeks after the injury, he started feeling right-sided back pain and zinging electrical pain down the posterior right leg all the way to the foot with associated numbness/tingling in the foot and leg.  This seems to be worse every time he is in lumbar flexion or lifting heavy objects.  Has been trying to use proper lifting technique to avoid this, which is somewhat helpful.  Presents today due to lingering pain.      His history and physical exam today appear most consistent with an acute right proximal hamstring strain/partial tear and subsequent right L5/S1 lumbosacral radiculopathy likely from disc herniation.     We discussed the nature of the condition and available treatment options, and mutually agreed upon the following plan:    - Imaging:          - Reviewed and independently interpreted the relevant imaging in the chart, including any imaging ordered for today's clinic.  - Reviewed results and images with patient.   - Medications:          - Discussed pharmacologic options for pain relief.   - May use NSAIDs (Ibuprofen, Naproxen) or Acetaminophen (Tylenol) as needed for pain control.   - Do not take these if previously advised to avoid them for other medical conditions.  - May also use topical medications such as lidocaine, IcyHot, BioFreeze, or Voltaren gel as needed for pain control.    - Voltaren gel is an anti-inflammatory cream that may be used up to 4 times per day over the painful area.   -Added a Medrol Dosepak for acute radiculopathy symptoms.  - Injections:          - Discussed possible injection options and alternatives.    - Injection options include: Lumbosacral DEVON at L5-S1 likely if symptoms persist.  Ischial bursa injection could be considered if proximal hamstring issues persist.  - Therapy:          - Discussed the benefits of therapy vs home exercise program for optimization of range of motion, flexibility,  "strength, stability and function.   - Preference is for therapy for both the hamstring and lumbosacral radiculopathy.   -Physical therapy referral placed today and instructed to call [phone] to schedule appointments.   - Modalities:          - May use ice, heat, massage or other modalities as needed.  - Surgery:          - Discussed non-operative and operative treatment options for the patient's condition.  No significant hamstring tear is suspected and no neurologic red flags for lumbosacral radiculopathy to warrant surgical consideration.  Expect him to do well with conservative management.  - Activity:          - Encouraged to remain active and participate in regular activities as symptoms allow.   Avoid or modify exacerbating activities as needed.  - Follow up:          -In 6 to 8 weeks for re-evaluation and update to treatment plan.  - May follow up sooner for new/worsening symptoms.  - May contact clinic by phone or MyChart for questions or concerns.       Updated Plan - 6/13/24:  Johan messaged in and called about sever radiating pain down the posterior leg and rapid onset weakness of the leg and stating that he could not walk anymore.  He was also noticing an acute onset rash covering his \"whole body\" with numbness/tingling worsening in BLE.  I attempted to get in contact with him over the phone, but there was no answer.  I gave him options for workup that included urgent care, ED, or a stat MRI of the lumbar spine.  He did not feel that the symptoms warranted an emergency department visit and he ended up getting the MRI.  Presents today by telephone to discuss the results of the stat MRI and next steps.    After discussion, it sounds like he does have severe pain shooting down the back of the leg to the bottom of the foot that is electrical in nature, numbness/tingling in a similar distribution, consistent with S1 radiculopathy.  This correlates well with new MRI findings of an acute disc herniation " compressing on the right S1 nerve root.  He denies rapidly progressive weakness of the right leg or bilateral legs, bowel/bladder changes, saddle anesthesia, balance changes.  His rash seems to be getting better after starting the Medrol Dosepak.    We discussed our treatment options.  I do not believe his lumbar pathology or symptoms warrant urgent surgical consideration, but he could benefit from an epidural steroid injection.  I placed a referral today for a right-sided L5-S1 DEVON.  In the meantime, recommended to continue the Medrol Dosepak, Robaxin, and increase the frequency of his lumbar extension based exercises which he will do and he currently does endorse that they improve symptoms somewhat.       Milton Wallace DO, CAQSM  [facility] - Department of Orthopedic Surgery       Disclaimer:  This note was prepared and written using Dragon Medical dictation software. As a result, there may be errors in the script that have gone undetected. Please consider this when interpreting the information in this note.           Again, thank you for allowing me to participate in the care of your patient.        Sincerely,        Milton Wallace DO

## 2024-06-17 ENCOUNTER — PREP FOR PROCEDURE (OUTPATIENT)
Dept: PALLIATIVE MEDICINE | Facility: CLINIC | Age: 39
End: 2024-06-17

## 2024-06-18 ENCOUNTER — PREP FOR PROCEDURE (OUTPATIENT)
Dept: PALLIATIVE MEDICINE | Facility: CLINIC | Age: 39
End: 2024-06-18

## 2024-06-18 ENCOUNTER — TELEPHONE (OUTPATIENT)
Dept: PALLIATIVE MEDICINE | Facility: CLINIC | Age: 39
End: 2024-06-18

## 2024-06-18 DIAGNOSIS — M47.817 SPONDYLOSIS OF LUMBOSACRAL REGION WITHOUT MYELOPATHY OR RADICULOPATHY: Primary | ICD-10-CM

## 2024-06-18 DIAGNOSIS — M51.27 LUMBOSACRAL DISC HERNIATION: ICD-10-CM

## 2024-06-19 ENCOUNTER — THERAPY VISIT (OUTPATIENT)
Dept: PHYSICAL THERAPY | Facility: CLINIC | Age: 39
End: 2024-06-19
Attending: STUDENT IN AN ORGANIZED HEALTH CARE EDUCATION/TRAINING PROGRAM

## 2024-06-19 DIAGNOSIS — M54.17 LUMBOSACRAL RADICULOPATHY: ICD-10-CM

## 2024-06-19 DIAGNOSIS — S76.311A HAMSTRING MUSCLE STRAIN, RIGHT, INITIAL ENCOUNTER: ICD-10-CM

## 2024-06-19 PROCEDURE — 97530 THERAPEUTIC ACTIVITIES: CPT | Mod: GP

## 2024-06-19 PROCEDURE — 97110 THERAPEUTIC EXERCISES: CPT | Mod: GP

## 2024-06-19 PROCEDURE — 97161 PT EVAL LOW COMPLEX 20 MIN: CPT | Mod: GP

## 2024-06-19 NOTE — PROGRESS NOTES
PHYSICAL THERAPY EVALUATION  Type of Visit: Evaluation       Fall Risk Screen:  Fall screen completed by: PT  Have you fallen 2 or more times in the past year?: No  Have you fallen and had an injury in the past year?: No  Is patient a fall risk?: No    Subjective          Patient is a 38 year old male presenting with acute onset right low back and hamstring pain from a quick trunk bending motion to catch some shingling while working on a roof 10 weeks ago with continued RLE sciatica down to his foot with prolonged standing demands. Patient reports trunk bending, laying on his right side, lifting, and sitting to be his primary pain aggravators at this time. Patient reports using medication, supine rest, and ice packs as his primary pain relievers at this time. 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 Patient will benefit from skilled physical therapy services and has sufficient social support.     Presenting condition or subjective complaint: disc problem    Date of onset: 04/10/24      Relevant medical history:   Pain of right hand, epigastric pain, syncope, weakness of both legs, abdominal pain in left upper quadrant, liver lesion, eosinophilic esophagitis; esophageal dysphagia, injury of right hand, spasm, tobacco use, need for diptheria-tetanus-pertussis vaccine.     Dates & types of surgery:      Past Surgical History:   Procedure Laterality Date    COLONOSCOPY WITH CO2 INSUFFLATION N/A 9/11/2017    Procedure: COLONOSCOPY WITH CO2 INSUFFLATION;  EGD, Colonoscopy, Dr Correia referring, BMI 28.11, [facility] fax; [phone];  Surgeon: Amber Lowry MD;  Location: [facility]    COMBINED ESOPHAGOSCOPY, GASTROSCOPY, DUODENOSCOPY (EGD) WITH CO2 INSUFFLATION N/A 9/11/2017    Procedure: COMBINED ESOPHAGOSCOPY, GASTROSCOPY, DUODENOSCOPY (EGD) WITH CO2 INSUFFLATION;  EGD, Colonoscopy, Dr Correia  referring, BMI 28.11, [facility] ER fax; [phone];  Surgeon: Amber Lowry MD;  Location: [facility]    COMBINED ESOPHAGOSCOPY, GASTROSCOPY, DUODENOSCOPY (EGD) WITH CO2 INSUFFLATION N/A 7/28/2020    Procedure: ESOPHAGOGASTRODUODENOSCOPY, WITH CO2 INSUFFLATION;  Surgeon: Tiffany Ayala DO;  Location: [facility]    ESOPHAGOSCOPY, GASTROSCOPY, DUODENOSCOPY (EGD), COMBINED N/A 9/11/2017    Procedure: COMBINED ESOPHAGOSCOPY, GASTROSCOPY, DUODENOSCOPY (EGD), BIOPSY SINGLE OR MULTIPLE;;  Surgeon: Amber Lowry MD;  Location: [facility]    ESOPHAGOSCOPY, GASTROSCOPY, DUODENOSCOPY (EGD), COMBINED N/A 11/27/2018    Procedure: COMBINED ESOPHAGOSCOPY, GASTROSCOPY, DUODENOSCOPY (EGD), BIOPSY SINGLE OR MULTIPLE;  Surgeon: Reji Andujar MD;  Location: [facility]    ESOPHAGOSCOPY, GASTROSCOPY, DUODENOSCOPY (EGD), COMBINED N/A 7/28/2020    Procedure: Esophagogastroduodenoscopy, With Biopsy;  Surgeon: Tiffany Ayala DO;  Location: [facility]      Prior diagnostic imaging/testing results: MRI; X-ray       MRI Lumbar Spine:  - MRI lumbar spine 6/13/2024 shows a right central disc protrusion at L5-S1 that contributes to mass effect upon the descending right S1 nerve root.  Also shows a small disc bulge at L4-5 with mild canal stenosis and facet disease.  Mild right foraminal narrowing at this level as well.     X-Ray Pelvis:  IMPRESSION: Normal bones, joint spaces and alignment. There is no  evidence of fracture. No soft tissue abnormalities radiographically.    Prior therapy history for the same diagnosis, illness or injury:    No.     Prior Level of Function  Transfers: Independent  Ambulation: Independent  ADL: Independent  IADL: Driving, Finances, Housekeeping, Laundry, Meal preparation, Work, Yard work; Childcare    Living Environment  Social support: With a significant other or spouse   Type of home: House   Stairs to enter the home:       None listed.   Ramp: No   Stairs inside the home: No       Help at home: Self Cares  "(home health aide/personal care attendant, family, etc)  Equipment owned:   None listed.    Employment: Yes    Hobbies/Interests:   None listed    Patient goals for therapy: work without right low back or hamstring pain    Pain assessment: Pain present  Location: Lumbar Spine & Right Hamstring/Ratin/10  See objective evaluation for additional pain details     Objective   LUMBAR SPINE EVALUATION  PAIN: Pain Level at Rest: 6/10  Pain Level with Use: 6/10  Pain Location: lumbar spine and hip  Pain Quality: Sharp and electric kick  Pain Frequency: intermittent or daily  Pain is Exacerbated By: When sitting, sleeping on right side, lifting, bending forward.   Pain is Relieved By: cold, NSAIDs, rest, and stretch  INTEGUMENTARY (edema, incisions): WFL  POSTURE: WFL  GAIT:   Weightbearing Status:  FWB,for BLE's   Assistive Device(s): None  Gait Deviations: WFL  BALANCE/PROPRIOCEPTION: WFL  WEIGHTBEARING ALIGNMENT: WFL  NON-WEIGHTBEARING ALIGNMENT: WFL   ROM:   (Degrees) Left AROM Left PROM  Right AROM Right PROM   Hip Flexion WNL's  WNL's    Hip Extension WFL's  WFL's    Hip Abduction WNL's  WNL's    Hip Adduction  WFL's  WFL's   Hip Internal Rotation  WNL's  WNL's   Hip External Rotation  WNL's  WNL's   Knee Flexion WNL's  WNL's    Knee Extension WNL's  WNL's    Lumbar Side glide WNL's  WNL's   Lumbar Flexion Mod-max deficit with \"++\" right lumbar spine and hamstring pain   Lumbar Extension WNL's with improved lumbar spine pain   Pain: As detailed in ROM table above.   End feel: All firm end feels each for all BLE and lumbar ROM measures assessed in ROM table above.   PELVIC/SI SCREEN: Not assessed today.   STRENGTH:   MYOTOMES:  Supine BLE MMT's grossly 4+/5 without low back pain or right hamstring pain exacerbation during today's session.   DTR S: Not assessed today.   CORD SIGNS:   DERMATOMES: WNL     NEURAL TENSION: Lumbar WNL  Negative LLE and positive RLE supine SLR's tests (unclear if driven by hamstring strain " discomfort).   FLEXIBILITY: WFL  For BLE's except min-mod bilateral glute and hamstring tightness.   LUMBAR/HIP Special Tests:  Negative PERCY & ZULAYIR with supine assessment for BLE's.     PELVIS/SI SPECIAL TESTS: Not assessed today.   FUNCTIONAL TESTS:   PALPATION:   + Tenderness At Location Left Right   Quadratus Lumborum - +   Erector Spinae - +   Piriformis  - +   PSIS - -   ASIS - -   Iliac Crest - -   Glut Medius - +   Greater Trochanter - -   Ischial Tuberosity - +   Hamstrings - +   Hip Flexors - -   Vertebral  - +     SPINAL SEGMENTAL CONCLUSIONS: Not assessed today, to be assessed as needed at future treatment session.     Assessment & Plan   CLINICAL IMPRESSIONS  Medical Diagnosis: Lumbosacral radiculopathy (M54.17)    Hamstring muscle strain, right, initial encounter (S76.311A)    Treatment Diagnosis: Lumbosacral radiculopathy (M54.17)    Hamstring muscle strain, right, initial encounter (S76.311A)   Impression/Assessment: Patient is a 38 year old male with right hamstring and lumbar spine pain complaints.  The following significant findings have been identified: Pain, Decreased ROM/flexibility, Decreased joint mobility, Decreased strength, Impaired gait, Impaired muscle performance, and Decreased activity tolerance. These impairments interfere with their ability to perform work tasks, recreational activities, household chores, and driving  as compared to previous level of function.     Clinical Decision Making (Complexity):  Clinical Presentation: Stable/Uncomplicated  Clinical Presentation Rationale: based on medical and personal factors listed in PT evaluation  Clinical Decision Making (Complexity): Low complexity    PLAN OF CARE  Treatment Interventions:  Modalities: Cryotherapy, Hot Pack  Interventions: Gait Training, Manual Therapy, Neuromuscular Re-education, Therapeutic Activity, Therapeutic Exercise    Long Term Goals     PT Goal 1  Goal Identifier: Home Exercise Programs  Goal Description:  Patient will demonstrate proper performance and good adherence to his HEP's for 8 weeks for 5 of 7 days per week to demonstrate improved long term independence with management of his right low back and hamstring pain.  Rationale: to maximize safety and independence with performance of ADLs and functional tasks;to maximize safety and independence within the home;to maximize safety and independence within the community  Goal Progress: Patient tolerated today's selected lower extremity and lumbar stretches, as well as hamstring strengthening and lumbar extension positional exercises without abnormal increase in his lumbar or right hamstring pain during today's session.  Target Date: 08/14/24  PT Goal 2  Goal Identifier: Oswestry Disability Index  Goal Description: Patient will reduce his initial GRADY assessment score by 13% or greater to demonstrate reduced restriction of his low back pain with performance of his required functional mobility demands.  Rationale: to maximize safety and independence with performance of ADLs and functional tasks;to maximize safety and independence within the home;to maximize safety and independence within the community  Goal Progress: See initial GRADY assessment score.  Target Date: 08/14/24  PT Goal 3  Goal Identifier: [employer]  Goal Description: Patient will report working 8 hours at his [employer] occupation without abnormal compensation and with 2/10 or less low back pain to demonstrate improved safety with and tolerance to return to normal work related physical activity demands.  Rationale: to maximize safety and independence with performance of ADLs and functional tasks;to maximize safety and independence within the home;to maximize safety and independence within the community  Goal Progress: Not assessed today.  Target Date: 08/14/24      Frequency of Treatment: 1 visit per week  Duration of Treatment: 8 weeks    Recommended Referrals to Other Professionals: Not at this time.    Education Assessment:   Learner/Method: Patient;Listening;Demonstration;No Barriers to Learning  Education Comments: Patient reports understanding of his future therapeutic progression.    Risks and benefits of evaluation/treatment have been explained.   Patient/Family/caregiver agrees with Plan of Care.     Evaluation Time:     PT Eval, Low Complexity Minutes (52710): 20     Signing Clinician:     Yusef Díaz PT, DPT    [facility]  O: [phone]  E: [email or web address]

## 2024-06-26 ENCOUNTER — OFFICE VISIT (OUTPATIENT)
Dept: FAMILY MEDICINE | Facility: OTHER | Age: 39
End: 2024-06-26

## 2024-06-26 VITALS
HEART RATE: 70 BPM | OXYGEN SATURATION: 97 % | TEMPERATURE: 97.7 F | SYSTOLIC BLOOD PRESSURE: 127 MMHG | WEIGHT: 235 LBS | DIASTOLIC BLOOD PRESSURE: 80 MMHG | RESPIRATION RATE: 18 BRPM | BODY MASS INDEX: 31.83 KG/M2 | HEIGHT: 72 IN

## 2024-06-26 DIAGNOSIS — Z01.818 PREOP GENERAL PHYSICAL EXAM: Primary | ICD-10-CM

## 2024-06-26 DIAGNOSIS — M54.16 LUMBAR RADICULOPATHY: ICD-10-CM

## 2024-06-26 PROCEDURE — 99203 OFFICE O/P NEW LOW 30 MIN: CPT | Performed by: PHYSICIAN ASSISTANT

## 2024-06-26 ASSESSMENT — PAIN SCALES - GENERAL: PAINLEVEL: SEVERE PAIN (7)

## 2024-06-26 ASSESSMENT — PATIENT HEALTH QUESTIONNAIRE - PHQ9
SUM OF ALL RESPONSES TO PHQ QUESTIONS 1-9: 7
10. IF YOU CHECKED OFF ANY PROBLEMS, HOW DIFFICULT HAVE THESE PROBLEMS MADE IT FOR YOU TO DO YOUR WORK, TAKE CARE OF THINGS AT HOME, OR GET ALONG WITH OTHER PEOPLE: EXTREMELY DIFFICULT
SUM OF ALL RESPONSES TO PHQ QUESTIONS 1-9: 7

## 2024-06-26 NOTE — PROGRESS NOTES
Preoperative Evaluation  [address]  [facility] [address]  Phone: [phone]  Primary Provider: Physician No Ref-Primary  Pre-op Performing Provider: Golden Evans PA-C  Jun 26, 2024 6/26/2024   Surgical Information   What procedure is being done? Right Lumbar 5-Sacral 1 transforaminal epidural steroid injection    Facility or Hospital where procedure/surgery will be performed: [facility]   Who is doing the procedure / surgery? Dr. Diaz   Date of surgery / procedure: 6/27/2024   Time of surgery / procedure: 830   Where do you plan to recover after surgery? at home with family        Fax number for surgical facility: Note does not need to be faxed, will be available electronically in Epic.    Assessment & Plan     The proposed surgical procedure is considered INTERMEDIATE risk.      ICD-10-CM    1. Preop general physical exam  Z01.818       2. Lumbar radiculopathy  M54.16             Cleared for the procedure.    I am happy to take over as his primary care provider. I recommend an annual exam before the end of the year.         - No identified additional risk factors other than previously addressed    Antiplatelet or Anticoagulation Medication Instructions   - Patient is on no antiplatelet or anticoagulation medications.    Additional Medication Instructions  Hold ibuprofen until after surgery.    Recommendation  Approval given to proceed with proposed procedure, without further diagnostic evaluation.    Jeanette Prado is a 38 year old, presenting for the following:  Pre-Op Exam          6/26/2024     2:41 PM   Additional Questions   Roomed by Leeann REYNOSO related to upcoming procedure: He has a right L5-S1 disc protrusion causing pain and radicular symptoms so he will be undergoing a lumbar DEVON as noted above.         6/26/2024   Pre-Op Questionnaire   Have you ever had a heart attack or stroke? No   Have you ever had  surgery on your heart or blood vessels, such as a stent placement, a coronary artery bypass, or surgery on an artery in your head, neck, heart, or legs? No   Do you have chest pain with activity? No   Do you have a history of heart failure? No   Do you currently have a cold, bronchitis or symptoms of other infection? No   Do you have a cough, shortness of breath, or wheezing? No   Do you or anyone in your family have previous history of blood clots? No   Do you or does anyone in your family have a serious bleeding problem such as prolonged bleeding following surgeries or cuts? No   Have you ever had problems with anemia or been told to take iron pills? No   Have you had any abnormal blood loss such as black, tarry or bloody stools? No   Have you ever had a blood transfusion? No   Are you willing to have a blood transfusion if it is medically needed before, during, or after your surgery? Yes   Have you or any of your relatives ever had problems with anesthesia? No   Do you have sleep apnea, excessive snoring or daytime drowsiness? No   Do you have any artifical heart valves or other implanted medical devices like a pacemaker, defibrillator, or continuous glucose monitor? No   Do you have artificial joints? No   Are you allergic to latex? No        Health Care Directive  Patient does not have a Health Care Directive or Living Will: Discussed advance care planning with patient; information given to patient to review.      Status of Chronic Conditions:  See problem list for active medical problems.  Problems all longstanding and stable, except as noted/documented.  See ROS for pertinent symptoms related to these conditions.    Patient Active Problem List    Diagnosis Date Noted    Abdominal pain, left upper quadrant 10/09/2019     Priority: Medium    Tobacco use 06/20/2019     Priority: Medium    Weakness of both legs 12/06/2018     Priority: Medium    Eosinophilic esophagitis 11/30/2018     Priority: Medium     Esophageal dysphagia 11/30/2018     Priority: Medium    Spasm 11/25/2018     Priority: Medium    Epigastric pain 08/16/2017     Priority: Medium    Liver lesion 08/16/2017     Priority: Medium    Syncope, unspecified syncope type 08/16/2017     Priority: Medium    Injury of right hand, initial encounter 01/09/2017     Priority: Medium    Pain of right hand 01/09/2017     Priority: Medium    Need for diphtheria-tetanus-pertussis (Tdap) vaccine, adult/adolescent 01/09/2017     Priority: Medium      Past Medical History:   Diagnosis Date    Tobacco use 6/20/2019     Past Surgical History:   Procedure Laterality Date    COLONOSCOPY WITH CO2 INSUFFLATION N/A 9/11/2017    Procedure: COLONOSCOPY WITH CO2 INSUFFLATION;  EGD, Colonoscopy, Dr Correia referring, BMI 28.11, [facility] fax; [phone];  Surgeon: Amber Lowry MD;  Location: [facility]    COMBINED ESOPHAGOSCOPY, GASTROSCOPY, DUODENOSCOPY (EGD) WITH CO2 INSUFFLATION N/A 9/11/2017    Procedure: COMBINED ESOPHAGOSCOPY, GASTROSCOPY, DUODENOSCOPY (EGD) WITH CO2 INSUFFLATION;  EGD, Colonoscopy, Dr Correia referring, BMI 28.11, [facility] fax; [phone];  Surgeon: Amber Lowry MD;  Location: [facility]    COMBINED ESOPHAGOSCOPY, GASTROSCOPY, DUODENOSCOPY (EGD) WITH CO2 INSUFFLATION N/A 7/28/2020    Procedure: ESOPHAGOGASTRODUODENOSCOPY, WITH CO2 INSUFFLATION;  Surgeon: Tiffany Ayala DO;  Location: [facility]    ESOPHAGOSCOPY, GASTROSCOPY, DUODENOSCOPY (EGD), COMBINED N/A 9/11/2017    Procedure: COMBINED ESOPHAGOSCOPY, GASTROSCOPY, DUODENOSCOPY (EGD), BIOPSY SINGLE OR MULTIPLE;;  Surgeon: Amber Lowry MD;  Location: [facility]    ESOPHAGOSCOPY, GASTROSCOPY, DUODENOSCOPY (EGD), COMBINED N/A 11/27/2018    Procedure: COMBINED ESOPHAGOSCOPY, GASTROSCOPY, DUODENOSCOPY (EGD), BIOPSY SINGLE OR MULTIPLE;  Surgeon: Reji Andujar MD;  [facility]    ESOPHAGOSCOPY, GASTROSCOPY, DUODENOSCOPY (EGD), COMBINED N/A 7/28/2020    Procedure:  "Esophagogastroduodenoscopy, With Biopsy;  Surgeon: Tiffany Ayala DO;  Location: [facility]     Current Outpatient Medications   Medication Sig Dispense Refill    budesonide (ENTOCORT) 0.6 mg/mL SUSP Take 3.33 mLs (2 mg) by mouth daily 99.9 mL 1    COMPOUNDED NON-CONTROLLED SUBSTANCE (CMPD RX) - PHARMACY TO MIX COMPOUNDED MEDICATION 2 mg by Oral or Feeding Tube route daily Take 2 mg daily (Patient not taking: Reported on 2024) 60 mg 1    methylPREDNISolone (MEDROL DOSEPAK) 4 MG tablet therapy pack Follow Package Directions (Patient not taking: Reported on 2024) 21 tablet 0    methylPREDNISolone (MEDROL DOSEPAK) 4 MG tablet therapy pack Follow Package Directions (Patient not taking: Reported on 2024) 21 tablet 0    triamcinolone (KENALOG) 0.1 % external ointment Apply topically 2 times daily (Patient not taking: Reported on 2024) 30 g 0       No Known Allergies     Social History     Tobacco Use    Smoking status: Former     Current packs/day: 0.00     Types: Dip, chew, snus or snuff, Cigarettes     Quit date: 2017     Years since quittin.6    Smokeless tobacco: Former     Types: Chew    Tobacco comments:     every 3 days   Substance Use Topics    Alcohol use: Yes     Comment: Once a week     History   Drug Use No             Review of Systems  Constitutional, HEENT, cardiovascular, pulmonary, GI, , musculoskeletal, neuro, skin, endocrine and psych systems are negative, except as otherwise noted.    Objective    /80   Pulse 70   Temp 97.7  F (36.5  C) (Temporal)   Resp 18   Ht 1.84 m (6' 0.44\")   Wt 106.6 kg (235 lb)   SpO2 97%   BMI 31.48 kg/m     Estimated body mass index is 31.48 kg/m  as calculated from the following:    Height as of this encounter: 1.84 m (6' 0.44\").    Weight as of this encounter: 106.6 kg (235 lb).  Physical Exam  GENERAL: alert and no distress  EYES: Eyes grossly normal to inspection, PERRL and conjunctivae and sclerae normal  HENT: ear canals and " "TM's normal, nose and mouth without ulcers or lesions  NECK: no adenopathy, no asymmetry, masses, or scars  RESP: lungs clear to auscultation - no rales, rhonchi or wheezes  CV: regular rate and rhythm, normal S1 S2, no S3 or S4, no murmur, click or rub, no peripheral edema  ABDOMEN: soft, nontender, no hepatosplenomegaly, no masses and bowel sounds normal  MS: no gross musculoskeletal defects noted, no edema  SKIN: no suspicious lesions or rashes  NEURO: Normal strength and tone, mentation intact and speech normal. Gait is stable.  PSYCH: mentation appears normal, affect normal/bright    No results for input(s): \"HGB\", \"PLT\", \"INR\", \"NA\", \"POTASSIUM\", \"CR\", \"A1C\" in the last 8760 hours.     Diagnostics  No labs were ordered during this visit.   No EKG required for low risk surgery (cataract, skin procedure, breast biopsy, etc).    Revised Cardiac Risk Index (RCRI)  The patient has the following serious cardiovascular risks for perioperative complications:   - No serious cardiac risks = 0 points     RCRI Interpretation: 0 points: Class I (very low risk - 0.4% complication rate)         Signed Electronically by: Golden Evans PA-C  Copy of this evaluation report is provided to requesting physician.      Answers submitted by the patient for this visit:  Patient Health Questionnaire (Submitted on 6/26/2024)  If you checked off any problems, how difficult have these problems made it for you to do your work, take care of things at home, or get along with other people?: Extremely difficult  PHQ9 TOTAL SCORE: 7    "

## 2024-06-27 ENCOUNTER — ANESTHESIA (OUTPATIENT)
Dept: SURGERY | Facility: CLINIC | Age: 39
End: 2024-06-27

## 2024-06-27 ENCOUNTER — ANESTHESIA EVENT (OUTPATIENT)
Dept: SURGERY | Facility: CLINIC | Age: 39
End: 2024-06-27

## 2024-06-27 ENCOUNTER — HOSPITAL ENCOUNTER (OUTPATIENT)
Dept: GENERAL RADIOLOGY | Facility: CLINIC | Age: 39
Discharge: HOME | End: 2024-06-27
Attending: ANESTHESIOLOGY | Admitting: ANESTHESIOLOGY

## 2024-06-27 ENCOUNTER — HOSPITAL ENCOUNTER (OUTPATIENT)
Facility: CLINIC | Age: 39
Discharge: HOME | End: 2024-06-27
Attending: ANESTHESIOLOGY | Admitting: ANESTHESIOLOGY

## 2024-06-27 VITALS
RESPIRATION RATE: 16 BRPM | OXYGEN SATURATION: 93 % | HEART RATE: 79 BPM | TEMPERATURE: 96.8 F | BODY MASS INDEX: 31.48 KG/M2 | WEIGHT: 235 LBS | DIASTOLIC BLOOD PRESSURE: 85 MMHG | SYSTOLIC BLOOD PRESSURE: 128 MMHG

## 2024-06-27 DIAGNOSIS — M54.16 LUMBAR RADICULOPATHY: ICD-10-CM

## 2024-06-27 PROCEDURE — 370N000017 HC ANESTHESIA TECHNICAL FEE, PER MIN: Performed by: ANESTHESIOLOGY

## 2024-06-27 PROCEDURE — 250N000011 HC RX IP 250 OP 636: Mod: JZ | Performed by: ANESTHESIOLOGY

## 2024-06-27 PROCEDURE — 999N000179 XR SURGERY CARM FLUORO LESS THAN 5 MIN W STILLS: Mod: TC

## 2024-06-27 PROCEDURE — 64483 NJX AA&/STRD TFRM EPI L/S 1: CPT | Performed by: ANESTHESIOLOGY

## 2024-06-27 PROCEDURE — 64483 NJX AA&/STRD TFRM EPI L/S 1: CPT | Mod: RT | Performed by: ANESTHESIOLOGY

## 2024-06-27 PROCEDURE — 250N000011 HC RX IP 250 OP 636: Performed by: NURSE ANESTHETIST, CERTIFIED REGISTERED

## 2024-06-27 PROCEDURE — 250N000009 HC RX 250: Performed by: NURSE ANESTHETIST, CERTIFIED REGISTERED

## 2024-06-27 RX ORDER — IOPAMIDOL 612 MG/ML
INJECTION, SOLUTION INTRATHECAL PRN
Status: DISCONTINUED | OUTPATIENT
Start: 2024-06-27 | End: 2024-06-27 | Stop reason: HOSPADM

## 2024-06-27 RX ORDER — PROPOFOL 10 MG/ML
INJECTION, EMULSION INTRAVENOUS PRN
Status: DISCONTINUED | OUTPATIENT
Start: 2024-06-27 | End: 2024-06-27

## 2024-06-27 RX ORDER — DEXAMETHASONE SODIUM PHOSPHATE 10 MG/ML
4 INJECTION, SOLUTION INTRAMUSCULAR; INTRAVENOUS
Status: DISCONTINUED | OUTPATIENT
Start: 2024-06-27 | End: 2024-06-27 | Stop reason: HOSPADM

## 2024-06-27 RX ORDER — NALOXONE HYDROCHLORIDE 0.4 MG/ML
0.1 INJECTION, SOLUTION INTRAMUSCULAR; INTRAVENOUS; SUBCUTANEOUS
Status: DISCONTINUED | OUTPATIENT
Start: 2024-06-27 | End: 2024-06-27 | Stop reason: HOSPADM

## 2024-06-27 RX ORDER — LIDOCAINE HYDROCHLORIDE 20 MG/ML
INJECTION, SOLUTION INFILTRATION; PERINEURAL PRN
Status: DISCONTINUED | OUTPATIENT
Start: 2024-06-27 | End: 2024-06-27

## 2024-06-27 RX ORDER — ONDANSETRON 2 MG/ML
4 INJECTION INTRAMUSCULAR; INTRAVENOUS EVERY 30 MIN PRN
Status: DISCONTINUED | OUTPATIENT
Start: 2024-06-27 | End: 2024-06-27 | Stop reason: HOSPADM

## 2024-06-27 RX ORDER — LIDOCAINE 40 MG/G
CREAM TOPICAL
Status: DISCONTINUED | OUTPATIENT
Start: 2024-06-27 | End: 2024-06-27 | Stop reason: HOSPADM

## 2024-06-27 RX ORDER — ONDANSETRON 4 MG/1
4 TABLET, ORALLY DISINTEGRATING ORAL EVERY 30 MIN PRN
Status: DISCONTINUED | OUTPATIENT
Start: 2024-06-27 | End: 2024-06-27 | Stop reason: HOSPADM

## 2024-06-27 RX ORDER — TRIAMCINOLONE ACETONIDE 40 MG/ML
INJECTION, SUSPENSION INTRA-ARTICULAR; INTRAMUSCULAR PRN
Status: DISCONTINUED | OUTPATIENT
Start: 2024-06-27 | End: 2024-06-27 | Stop reason: HOSPADM

## 2024-06-27 RX ADMIN — PROPOFOL 80 MG: 10 INJECTION, EMULSION INTRAVENOUS at 08:25

## 2024-06-27 RX ADMIN — PROPOFOL 30 MG: 10 INJECTION, EMULSION INTRAVENOUS at 08:29

## 2024-06-27 RX ADMIN — PROPOFOL 50 MG: 10 INJECTION, EMULSION INTRAVENOUS at 08:27

## 2024-06-27 RX ADMIN — LIDOCAINE HYDROCHLORIDE 50 MG: 20 INJECTION, SOLUTION INFILTRATION; PERINEURAL at 08:25

## 2024-06-27 ASSESSMENT — ACTIVITIES OF DAILY LIVING (ADL)
ADLS_ACUITY_SCORE: 37
ADLS_ACUITY_SCORE: 37

## 2024-06-27 ASSESSMENT — LIFESTYLE VARIABLES: TOBACCO_USE: 1

## 2024-06-27 NOTE — ANESTHESIA CARE TRANSFER NOTE
Patient: Crescencio Moncada    Procedure: Procedure(s):  Right sacral 1 transforaminal epidural injection       Diagnosis: Spondylosis of lumbosacral region without myelopathy or radiculopathy [M47.817]  Lumbosacral disc herniation [M51.27]  Diagnosis Additional Information: No value filed.    Anesthesia Type:   MAC     Note:    Oropharynx: oropharynx clear of all foreign objects and spontaneously breathing  Level of Consciousness: drowsy  Oxygen Supplementation: nasal cannula    Independent Airway: airway patency satisfactory and stable  Dentition: dentition unchanged  Vital Signs Stable: post-procedure vital signs reviewed and stable  Report to RN Given: handoff report given  Patient transferred to: Phase II    Handoff Report: Identifed the Patient, Identified the Reponsible Provider, Reviewed the pertinent medical history, Discussed the surgical course, Reviewed Intra-OP anesthesia mangement and issues during anesthesia, Set expectations for post-procedure period and Allowed opportunity for questions and acknowledgement of understanding      Vitals:  Vitals Value Taken Time   /71 06/27/24 0839   Temp     Pulse 87 06/27/24 0839   Resp 16 06/27/24 0839   SpO2 94 % 06/27/24 0843   Vitals shown include unfiled device data.    Electronically Signed By: MAGALIS Toney CRNA  June 27, 2024  8:45 AM

## 2024-06-27 NOTE — DISCHARGE INSTRUCTIONS

## 2024-06-27 NOTE — ANESTHESIA POSTPROCEDURE EVALUATION
Patient: Crescencio Moncada    Procedure: Procedure(s):  Right sacral 1 transforaminal epidural injection       Anesthesia Type:  MAC    Note:  Disposition: Outpatient   Postop Pain Control: Uneventful            Sign Out: Well controlled pain   PONV: No   Neuro/Psych: Uneventful            Sign Out: Acceptable/Baseline neuro status   Airway/Respiratory: Uneventful            Sign Out: Acceptable/Baseline resp. status   CV/Hemodynamics: Uneventful            Sign Out: Acceptable CV status   Other NRE: NONE   DID A NON-ROUTINE EVENT OCCUR? No    Event details/Postop Comments:  Pt was happy with anesthesia care.  No complications.  I will follow up with the pt if needed.           Last vitals:  Vitals Value Taken Time   /85 06/27/24 0900   Temp     Pulse 79 06/27/24 0900   Resp 16 06/27/24 0900   SpO2 93 % 06/27/24 0900   Vitals shown include unfiled device data.    Electronically Signed By: MAGALIS Toney CRNA  June 27, 2024  9:34 AM

## 2024-06-27 NOTE — ANESTHESIA PREPROCEDURE EVALUATION
Anesthesia Pre-Procedure Evaluation    Patient: Crescencio Moncada   MRN: [id number] : 1985        Procedure : Procedure(s):  Right Lumbar 5-Sacral 1 transforaminal epidural steroid injection          Past Medical History:   Diagnosis Date    Tobacco use 2019      Past Surgical History:   Procedure Laterality Date    COLONOSCOPY WITH CO2 INSUFFLATION N/A 2017    Procedure: COLONOSCOPY WITH CO2 INSUFFLATION;  EGD, Colonoscopy, Dr Correia referring, BMI 28.11, [facility] fax; [phone];  Surgeon: Amber Lowry MD;  [facility]: [facility]    COMBINED ESOPHAGOSCOPY, GASTROSCOPY, DUODENOSCOPY (EGD) WITH CO2 INSUFFLATION N/A 2017    Procedure: COMBINED ESOPHAGOSCOPY, GASTROSCOPY, DUODENOSCOPY (EGD) WITH CO2 INSUFFLATION;  EGD, Colonoscopy, Dr Correia referring, BMI 28.11, [facility] fax; [phone];  Surgeon: Amber Lowry MD;  [facility]: [facility]    COMBINED ESOPHAGOSCOPY, GASTROSCOPY, DUODENOSCOPY (EGD) WITH CO2 INSUFFLATION N/A 2020    Procedure: ESOPHAGOGASTRODUODENOSCOPY, WITH CO2 INSUFFLATION;  Surgeon: Tiffany Ayala DO;  [facility]    ESOPHAGOSCOPY, GASTROSCOPY, DUODENOSCOPY (EGD), COMBINED N/A 2017    Procedure: COMBINED ESOPHAGOSCOPY, GASTROSCOPY, DUODENOSCOPY (EGD), BIOPSY SINGLE OR MULTIPLE;;  Surgeon: Amber Lowry MD;  Location: [facility]    ESOPHAGOSCOPY, GASTROSCOPY, DUODENOSCOPY (EGD), COMBINED N/A 2018    Procedure: COMBINED ESOPHAGOSCOPY, GASTROSCOPY, DUODENOSCOPY (EGD), BIOPSY SINGLE OR MULTIPLE;  Surgeon: Reji Andujar MD;  [facility]    ESOPHAGOSCOPY, GASTROSCOPY, DUODENOSCOPY (EGD), COMBINED N/A 2020    Procedure: Esophagogastroduodenoscopy, With Biopsy;  Surgeon: Tiffany Ayala DO;  Location: [facility]      No Known Allergies   Social History     Tobacco Use    Smoking status: Former     Current packs/day: 0.00     Types: Dip, chew, snus or snuff, Cigarettes     Quit date: 2017     Years since quittin.6     Smokeless tobacco: Former     Types: Chew    Tobacco comments:     every 3 days   Substance Use Topics    Alcohol use: Yes     Comment: Once a week      Wt Readings from Last 1 Encounters:   24 106.6 kg (235 lb)        Anesthesia Evaluation   Pt has had prior anesthetic. Type: General and MAC.    No history of anesthetic complications       ROS/MED HX  ENT/Pulmonary:     (+)                tobacco use, Past use,                       Neurologic: Comment: Leg weakness      Cardiovascular:     (+)  - -   -  - -                                 Previous cardiac testing   Echo: Date: 17 Results:  Reading Physician Reading Date Result Priority  Alex Laughlin MD  [phone] 2017     Narrative & Impression  [id number]  [id number]  [id number]  [id number]^ELIF^JOSHUA^GLORIA           [facility]  Echocardiography Laboratory  [address] [place] [address]        Name: PAOLA CORREA  MRN: [id number]  : 1985  Study Date: 2017 02:27 PM  Age: 31 yrs  Gender: Male  Patient Location: [facility]  Reason For Study: Chest pain, unspecified, Syncope and collapse, Family  history  Ordering Physician: JOSHUA ASENCIO  Referring Physician: JOSHUA ASENCIO  Performed By: Makeda Mcduffie     BSA: 2.2 m2  Height: 72 in  Weight: 211 lb  HR: 91  BP: 134/72 mmHg  _____________________________________________________________________________  __        Procedure  Stress Echo Complete. Contrast Optison.  _____________________________________________________________________________  __        Interpretation Summary  Normal resting heart rate and blood pressure with normal response to exercise.  Normal exercise capacity for age  Normal resting ECG, no ischemic changes with exercise.  Normal resting LV function with good augmentation of all wall segments post  exercise. No wall motion abnormalities.  Normal stress  echo  _____________________________________________________________________________  __     Stress  Exercise was stopped due to fatigue.  There was a normal BP response to exercise.  The patient exhibited no chest pain during exercise.  This was a normal stress EKG with no evidence of stress-induced ischemia.  No arrhythmia noted.  This was a normal stress echocardiogram with no evidence of stress-induced  ischemia.  The visual ejection fraction is estimated at >70%.  Global LV systolic function augments with exercise.  Left ventricular cavity size decreases with exercise.    Stress Test:  Date: Results:    ECG Reviewed:  Date: 6-27-17 Results:  SR  Cath:  Date: Results:      METS/Exercise Tolerance:     Hematologic:  - neg hematologic  ROS     Musculoskeletal: Comment: CLBP      GI/Hepatic:     (+)             liver disease,    (-) GERD   Renal/Genitourinary:  - neg Renal ROS     Endo:  - neg endo ROS     Psychiatric/Substance Use:  - neg psychiatric ROS     Infectious Disease:  - neg infectious disease ROS     Malignancy:  - neg malignancy ROS     Other:  - neg other ROS          Physical Exam    Airway        Mallampati: II   TM distance: > 3 FB   Neck ROM: full   Mouth opening: > 3 cm    Respiratory Devices and Support         Dental       (+) Minor Abnormalities - some fillings, tiny chips      Cardiovascular   cardiovascular exam normal       Rhythm and rate: regular and normal     Pulmonary   pulmonary exam normal        breath sounds clear to auscultation           OUTSIDE LABS:  CBC:   Lab Results   Component Value Date    WBC 6.9 07/27/2020    WBC 6.7 03/02/2020    HGB 15.7 07/27/2020    HGB 15.5 03/02/2020    HCT 45.3 07/27/2020    HCT 44.3 03/02/2020     07/27/2020     03/02/2020     BMP:   Lab Results   Component Value Date     07/27/2020     03/02/2020    POTASSIUM 4.1 07/27/2020    POTASSIUM 3.9 03/02/2020    CHLORIDE 109 07/27/2020    CHLORIDE 110 (H) 03/02/2020    CO2 27  "07/27/2020    CO2 24 03/02/2020    BUN 12 07/27/2020    BUN 16 03/02/2020    CR 0.97 07/27/2020    CR 0.84 03/02/2020    GLC 72 07/27/2020    GLC 89 03/02/2020     COAGS: No results found for: \"PTT\", \"INR\", \"FIBR\"  POC: No results found for: \"BGM\", \"HCG\", \"HCGS\"  HEPATIC:   Lab Results   Component Value Date    ALBUMIN 4.0 07/27/2020    PROTTOTAL 6.9 07/27/2020    ALT 35 07/27/2020    AST 20 07/27/2020    ALKPHOS 80 07/27/2020    BILITOTAL 0.4 07/27/2020     OTHER:   Lab Results   Component Value Date    A1C 5.1 12/16/2016    LEATHA 8.7 07/27/2020    PHOS 4.5 11/26/2018    MAG 2.2 11/26/2018    LIPASE 118 07/27/2020    AMYLASE 46 08/16/2017    TSH 1.81 12/06/2018    T4 1.06 11/26/2018    CRP <2.9 10/09/2019    SED 16 (H) 12/06/2018       Anesthesia Plan    ASA Status:  2    NPO Status:  NPO Appropriate    Anesthesia Type: MAC.     - Reason for MAC: straight local not clinically adequate   Induction: Intravenous, Propofol.   Maintenance: TIVA.        Consents    Anesthesia Plan(s) and associated risks, benefits, and realistic alternatives discussed. Questions answered and patient/representative(s) expressed understanding.     - Discussed:     - Discussed with:  Patient      - Extended Intubation/Ventilatory Support Discussed: No.      - Patient is DNR/DNI Status: No     Use of blood products discussed: No .     Postoperative Care       PONV prophylaxis: Background Propofol Infusion     Comments:    Other Comments: The risks and benefits of anesthesia, and the alternatives where applicable, have been discussed with the patient, and they wish to proceed.              MAGALIS Toney CRNA    I have reviewed the pertinent notes and labs in the chart from the past 30 days and (re)examined the patient.  Any updates or changes from those notes are reflected in this note.              # Obesity: Estimated body mass index is 31.48 kg/m  as calculated from the following:    Height as of 6/26/24: 1.84 m (6' 0.44\").    " Weight as of 6/26/24: 106.6 kg (235 lb).

## 2024-06-27 NOTE — OP NOTE
PRIMARY PROBLEM: Low back pain and lower extremity radicular pains    INDICATIONS FOR PROCEDURE:   1.This patient suffers from moderate to severe low back pain and lower extremity radicular pains.    2.This pain has persisted for more than 4 weeks and is causing significant functional disability when they are trying to perform ADL's.   3. They failed conservative care which consisted of giving this pain time to sophia, PT, meds  4. Preoperative NRS pain score was verbally reported to me today as a  8/10.   5. The patients radicular pains correlates to their MRI which shows right L5-S1 herniation causing S1 nerve root cmpression  6.  An order was sent to me to perform the technical component of a lumbar epidural steroid injection.    PROCEDURE: Right sacral 1  Transforaminal Epidural Steroid Injection with fluoroscopic guidance and contrast.     PROCEDURE DETAILS: After written informed consent was obtained from the patient, the patient was escorted to the procedure room.  The patient was placed in the prone position.  A  time out  was conducted to verify patient identity, procedure to be performed, side, site, allergies and any special requirements.  The skin over the thoracolumbar region was prepped and draped in normal sterile fashion with ChloraPrep. Fluoroscopy was used to identify the neural foramen in AP view and the skin was anesthetized with 2 mL of 1% lidocaine with bicarbonate buffer. A 22-gauge Quincke spinal needle was advanced through this location and advanced under fluoroscopic guidance towards hsi right S1 neural foramen.  The target zone was the 6 o clock position of the pedicle.   Prior to entering the foramen, the depth of the needle was gauged with a lateral view on fluoroscopy. While still in a lateral view, the needle was slowly advanced to avoid injury to the spinal nerve.  Then, in the oblique view (approximately 15 degrees), after negative aspiration, 1.5 mL of Omnipaque contrast dye was  injected revealing epidural spread without evidence of intravascular or intrathecal spread.  Then a 3cc solution of 40 mg of Triamcinolone in 2 mL of  Preservative-Free saline was slowly injected into the epidural space with good spread over his right sacral 1 nerve root..  After injection of the medication, as the needle tip was withdrawn, it was flushed with local anesthetic.   The patient was monitored with blood pressure and pulse oximetry machines with the assistance of an RN throughout the procedure.  The patient was alert and responsive to questions throughout the procedure.   The patient tolerated the procedure well and was observed in the post-procedural area.  The patient was dismissed without apparent complications.     BLOOD LOSS: < 5 cc    DIAGNOSIS:  1. Right sacral 1 radiculopathy    PLAN:  1. Performed a technically successful right sacral 1  transforaminal epidural steroid injection.  2. The patient was instructed to follow-up with the referring provider and discharge instructions were given for post-operative care.      Arben Diaz MD  Diplomate of the American Board of Anesthesiology, Pain Medicine

## 2024-07-13 ENCOUNTER — HEALTH MAINTENANCE LETTER (OUTPATIENT)
Age: 39
End: 2024-07-13

## 2025-02-18 NOTE — ANESTHESIA CARE TRANSFER NOTE
**LVM REMINDER APPT & LABS**     Electronically signed by Ly Anderson on 2/18/2025 at 10:51 AM     Patient: Crescencio Moncada    Procedure(s):  ESOPHAGOGASTRODUODENOSCOPY, WITH CO2 INSUFFLATION  Esophagogastroduodenoscopy, With Biopsy    Diagnosis: Eosinophilic esophagitis [K20.0]  Diagnosis Additional Information: No value filed.    Anesthesia Type:   MAC     Note:  Airway :Face Mask  Patient transferred to:Phase II  Handoff Report: Identifed the Patient, Identified the Reponsible Provider, Reviewed the pertinent medical history, Discussed the surgical course, Reviewed Intra-OP anesthesia mangement and issues during anesthesia, Set expectations for post-procedure period and Allowed opportunity for questions and acknowledgement of understanding      Vitals: (Last set prior to Anesthesia Care Transfer)    CRNA VITALS  7/28/2020 0856 - 7/28/2020 0927      7/28/2020             Temp:  98.6  F (37  C)                Electronically Signed By: MAGALIS Jane CRNA  July 28, 2020  9:27 AM

## 2025-07-07 ENCOUNTER — OFFICE VISIT (OUTPATIENT)
Dept: ORTHOPEDICS | Facility: CLINIC | Age: 40
End: 2025-07-07

## 2025-07-07 ENCOUNTER — ANCILLARY PROCEDURE (OUTPATIENT)
Dept: GENERAL RADIOLOGY | Facility: CLINIC | Age: 40
End: 2025-07-07
Attending: ORTHOPAEDIC SURGERY

## 2025-07-07 VITALS — HEIGHT: 72 IN | BODY MASS INDEX: 31.83 KG/M2 | WEIGHT: 235 LBS

## 2025-07-07 DIAGNOSIS — S69.91XA INJURY OF RIGHT HAND, INITIAL ENCOUNTER: Primary | ICD-10-CM

## 2025-07-07 DIAGNOSIS — S69.91XA INJURY OF RIGHT HAND, INITIAL ENCOUNTER: ICD-10-CM

## 2025-07-07 PROCEDURE — 73130 X-RAY EXAM OF HAND: CPT | Mod: TC | Performed by: RADIOLOGY

## 2025-07-07 PROCEDURE — 99203 OFFICE O/P NEW LOW 30 MIN: CPT | Performed by: ORTHOPAEDIC SURGERY

## 2025-07-07 ASSESSMENT — PAIN SCALES - GENERAL: PAINLEVEL_OUTOF10: SEVERE PAIN (7)

## 2025-07-07 NOTE — LETTER
7/7/2025      Crescencio Moncada  [address] [facility] [address]      Dear Colleague,    Thank you for referring your patient, Crescencio Moncada, to the [facility]. Please see a copy of my visit note below.    S:  Out on the water and caught thumb with valgus stress.  Now swollen and painful RUE.  Works laying tile.         Patient Active Problem List   Diagnosis     Injury of right hand, initial encounter     Pain of right hand     Need for diphtheria-tetanus-pertussis (Tdap) vaccine, adult/adolescent     Epigastric pain     Liver lesion     Syncope, unspecified syncope type     Spasm     Eosinophilic esophagitis     Esophageal dysphagia     Weakness of both legs     Tobacco use     Abdominal pain, left upper quadrant            Past Medical History:   Diagnosis Date     Tobacco use 6/20/2019            Past Surgical History:   Procedure Laterality Date     COLONOSCOPY WITH CO2 INSUFFLATION N/A 9/11/2017    Procedure: COLONOSCOPY WITH CO2 INSUFFLATION;  EGD, Colonoscopy, Dr Correia referring, BMI 28.11, [facility]  fax; [phone];  Surgeon: Amber Lowry MD;  Location: [facility]     COMBINED ESOPHAGOSCOPY, GASTROSCOPY, DUODENOSCOPY (EGD) WITH CO2 INSUFFLATION N/A 9/11/2017    Procedure: COMBINED ESOPHAGOSCOPY, GASTROSCOPY, DUODENOSCOPY (EGD) WITH CO2 INSUFFLATION;  EGD, Colonoscopy, Dr Correia referring, BMI 28.11, [facility] fax; [phone];  Surgeon: Amber Lowry MD;  Location: [facility]     COMBINED ESOPHAGOSCOPY, GASTROSCOPY, DUODENOSCOPY (EGD) WITH CO2 INSUFFLATION N/A 7/28/2020    Procedure: ESOPHAGOGASTRODUODENOSCOPY, WITH CO2 INSUFFLATION;  Surgeon: Tiffany Ayala DO;  Location: [facility]     ESOPHAGOSCOPY, GASTROSCOPY, DUODENOSCOPY (EGD), COMBINED N/A 9/11/2017    Procedure: COMBINED ESOPHAGOSCOPY, GASTROSCOPY, DUODENOSCOPY (EGD), BIOPSY SINGLE OR MULTIPLE;;  Surgeon: Amber Lowry MD;  Location: [facility]     ESOPHAGOSCOPY, GASTROSCOPY, DUODENOSCOPY (EGD),  COMBINED N/A 2018    Procedure: COMBINED ESOPHAGOSCOPY, GASTROSCOPY, DUODENOSCOPY (EGD), BIOPSY SINGLE OR MULTIPLE;  Surgeon: Reji Andujar MD;  [facility]     ESOPHAGOSCOPY, GASTROSCOPY, DUODENOSCOPY (EGD), COMBINED N/A 2020    Procedure: Esophagogastroduodenoscopy, With Biopsy;  Surgeon: Tiffany Ayala DO;  Location: [facility]     INJECT EPIDURAL TRANSFORAMINAL Right 2024    Procedure: Right sacral 1 Transforaminal Epidural Steroid Injection with fluoroscopic guidance and contrast.;  Surgeon: Arben Diaz MD;  Location: [facility]            Social History     Tobacco Use     Smoking status: Former     Current packs/day: 0.00     Types: Dip, chew, snus or snuff, Cigarettes     Quit date: 2017     Years since quittin.6     Smokeless tobacco: Former     Types: Chew     Tobacco comments:     every 3 days   Substance Use Topics     Alcohol use: Yes     Comment: Once a week            Family History   Problem Relation Age of Onset     Myocardial Infarction Paternal Grandfather 46     Genetic Disorder Mother         spinal muscular atrophy gene     Genetic Disorder Sister 0        spinal muscular atrophy     Coronary Artery Disease Paternal Half-Brother              No Known Allergies         Current Outpatient Medications   Medication Sig Dispense Refill     budesonide (ENTOCORT) 0.6 mg/mL SUSP Take 3.33 mLs (2 mg) by mouth daily (Patient not taking: Reported on 2025) 99.9 mL 1          Review Of Systems  Skin: negative  Eyes: negative  Ears/Nose/Throat: negative  Respiratory: No shortness of breath, dyspnea on exertion, cough, or hemoptysis    O: Physical Exam:  Some laxity with valgus stress UCL as well as reproduction pain.  Some ecchymosis and edema noted along thumb itself with less flexion related to swelling compared to contralateral.  CMS intact.    Lab:non contributory    Images:  Narrative & Impression   EXAM: XR HAND RIGHT G/E 3 VIEWS  LOCATION: [facility]  [facility]  DATE: 7/7/2025     INDICATION: Pain after injury  COMPARISON: None.                                                                      IMPRESSION: Old healed fracture of the fifth metacarpal. This was acute to subacute on the prior examination. This is healed with some minimal residual posttraumatic deformity. No evidence for acute fracture. No dislocation. No erosive changes.            A:  Ulnar collateral ligament injury RUE at thumb mcp, NO evidence stener lesion      P:  thumb spica brace (which he says he has at home) for 6-12 weeks  Okay to return to activity with some protection  See back as needed  Notify if exacerbation symptoms             In addition to the above assessment and plan each active problem on Crescencio's problem list was evaluated today. This included the questioning of Crescencio for any medication problems. We will continue the current treatment plan for these active problems except as noted.        Again, thank you for allowing me to participate in the care of your patient.        Sincerely,        Alex Obregon MD    Electronically signed

## 2025-07-07 NOTE — PROGRESS NOTES
S:  Out on the water and caught thumb with valgus stress.  Now swollen and painful RUE.  Works laying tile.         Patient Active Problem List   Diagnosis    Injury of right hand, initial encounter    Pain of right hand    Need for diphtheria-tetanus-pertussis (Tdap) vaccine, adult/adolescent    Epigastric pain    Liver lesion    Syncope, unspecified syncope type    Spasm    Eosinophilic esophagitis    Esophageal dysphagia    Weakness of both legs    Tobacco use    Abdominal pain, left upper quadrant            Past Medical History:   Diagnosis Date    Tobacco use 6/20/2019            Past Surgical History:   Procedure Laterality Date    COLONOSCOPY WITH CO2 INSUFFLATION N/A 9/11/2017    Procedure: COLONOSCOPY WITH CO2 INSUFFLATION;  EGD, Colonoscopy, Dr Correia referring, BMI 28.11, [facility] fax; [phone];  Surgeon: Amber Lowry MD;  Location: [facility]    COMBINED ESOPHAGOSCOPY, GASTROSCOPY, DUODENOSCOPY (EGD) WITH CO2 INSUFFLATION N/A 9/11/2017    Procedure: COMBINED ESOPHAGOSCOPY, GASTROSCOPY, DUODENOSCOPY (EGD) WITH CO2 INSUFFLATION;  EGD, Colonoscopy, Dr Correia referring, BMI 28.11, [facility] fax; [phone];  Surgeon: Amber Lowry MD;  Location: [facility]    COMBINED ESOPHAGOSCOPY, GASTROSCOPY, DUODENOSCOPY (EGD) WITH CO2 INSUFFLATION N/A 7/28/2020    Procedure: ESOPHAGOGASTRODUODENOSCOPY, WITH CO2 INSUFFLATION;  Surgeon: Tiffany Ayala DO;  Location: [facility]    ESOPHAGOSCOPY, GASTROSCOPY, DUODENOSCOPY (EGD), COMBINED N/A 9/11/2017    Procedure: COMBINED ESOPHAGOSCOPY, GASTROSCOPY, DUODENOSCOPY (EGD), BIOPSY SINGLE OR MULTIPLE;;  Surgeon: Amber Lowry MD;  Location: [facility]    ESOPHAGOSCOPY, GASTROSCOPY, DUODENOSCOPY (EGD), COMBINED N/A 11/27/2018    Procedure: COMBINED ESOPHAGOSCOPY, GASTROSCOPY, DUODENOSCOPY (EGD), BIOPSY SINGLE OR MULTIPLE;  Surgeon: Reji Andujar MD;  [facility]    ESOPHAGOSCOPY, GASTROSCOPY, DUODENOSCOPY (EGD), COMBINED N/A 7/28/2020     Procedure: Esophagogastroduodenoscopy, With Biopsy;  Surgeon: Tiffany Ayala DO;  Location: [facility]    INJECT EPIDURAL TRANSFORAMINAL Right 2024    Procedure: Right sacral 1 Transforaminal Epidural Steroid Injection with fluoroscopic guidance and contrast.;  Surgeon: Arben Diaz MD;  Location: [facility]            Social History     Tobacco Use    Smoking status: Former     Current packs/day: 0.00     Types: Dip, chew, snus or snuff, Cigarettes     Quit date: 2017     Years since quittin.6    Smokeless tobacco: Former     Types: Chew    Tobacco comments:     every 3 days   Substance Use Topics    Alcohol use: Yes     Comment: Once a week            Family History   Problem Relation Age of Onset    Myocardial Infarction Paternal Grandfather 46    Genetic Disorder Mother         spinal muscular atrophy gene    Genetic Disorder Sister 0        spinal muscular atrophy    Coronary Artery Disease Paternal Half-Brother              No Known Allergies         Current Outpatient Medications   Medication Sig Dispense Refill    budesonide (ENTOCORT) 0.6 mg/mL SUSP Take 3.33 mLs (2 mg) by mouth daily (Patient not taking: Reported on 2025) 99.9 mL 1          Review Of Systems  Skin: negative  Eyes: negative  Ears/Nose/Throat: negative  Respiratory: No shortness of breath, dyspnea on exertion, cough, or hemoptysis    O: Physical Exam:  Some laxity with valgus stress UCL as well as reproduction pain.  Some ecchymosis and edema noted along thumb itself with less flexion related to swelling compared to contralateral.  CMS intact.    Lab:non contributory    Images:  Narrative & Impression   EXAM: XR HAND RIGHT G/E 3 VIEWS  LOCATION: [facility]  DATE: 2025     INDICATION: Pain after injury  COMPARISON: None.                                                                      IMPRESSION: Old healed fracture of the fifth metacarpal. This was acute to subacute on the prior  examination. This is healed with some minimal residual posttraumatic deformity. No evidence for acute fracture. No dislocation. No erosive changes.            A:  Ulnar collateral ligament injury RUE at thumb mcp, NO evidence stener lesion      P:  thumb spica brace (which he says he has at home) for 6-12 weeks  Okay to return to activity with some protection  See back as needed  Notify if exacerbation symptoms             In addition to the above assessment and plan each active problem on Crescencio's problem list was evaluated today. This included the questioning of Crescencio for any medication problems. We will continue the current treatment plan for these active problems except as noted.

## 2025-07-19 ENCOUNTER — HEALTH MAINTENANCE LETTER (OUTPATIENT)
Age: 40
End: 2025-07-19

## (undated) DEVICE — SYR 10ML LL W/O NDL

## (undated) DEVICE — SYR 05ML LL W/O NDL

## (undated) DEVICE — GLOVE BIOGEL PI ULTRATOUCH G SZ 7.5 42175

## (undated) DEVICE — TUBING IV EXTENSION SET 34"

## (undated) DEVICE — NEEDLE SPINAL DISP 22GA X 5" QUINCKE 3333355

## (undated) DEVICE — SOL WATER IRRIG 1000ML BOTTLE 07139-09

## (undated) DEVICE — PREP CHLORAPREP 26ML TINTED ORANGE  260815

## (undated) DEVICE — TRAY EPDRL 3.5IN 17GA 19GA PRFX BPA DEHP 332079

## (undated) RX ORDER — GLYCOPYRROLATE 0.2 MG/ML
INJECTION, SOLUTION INTRAMUSCULAR; INTRAVENOUS
Status: DISPENSED
Start: 2018-11-27

## (undated) RX ORDER — LIDOCAINE HYDROCHLORIDE 20 MG/ML
INJECTION, SOLUTION EPIDURAL; INFILTRATION; INTRACAUDAL; PERINEURAL
Status: DISPENSED
Start: 2018-11-27

## (undated) RX ORDER — PROPOFOL 10 MG/ML
INJECTION, EMULSION INTRAVENOUS
Status: DISPENSED
Start: 2018-11-27

## (undated) RX ORDER — SIMETHICONE 40MG/0.6ML
SUSPENSION, DROPS(FINAL DOSAGE FORM)(ML) ORAL
Status: DISPENSED
Start: 2020-07-28

## (undated) RX ORDER — LIDOCAINE HYDROCHLORIDE 10 MG/ML
INJECTION, SOLUTION EPIDURAL; INFILTRATION; INTRACAUDAL; PERINEURAL
Status: DISPENSED
Start: 2024-06-27

## (undated) RX ORDER — FENTANYL CITRATE 50 UG/ML
INJECTION, SOLUTION INTRAMUSCULAR; INTRAVENOUS
Status: DISPENSED
Start: 2018-11-27

## (undated) RX ORDER — PROPOFOL 10 MG/ML
INJECTION, EMULSION INTRAVENOUS
Status: DISPENSED
Start: 2024-06-27